# Patient Record
Sex: MALE | Race: WHITE | NOT HISPANIC OR LATINO | URBAN - METROPOLITAN AREA
[De-identification: names, ages, dates, MRNs, and addresses within clinical notes are randomized per-mention and may not be internally consistent; named-entity substitution may affect disease eponyms.]

---

## 2019-02-12 ENCOUNTER — OUTPATIENT (OUTPATIENT)
Dept: OUTPATIENT SERVICES | Facility: HOSPITAL | Age: 64
LOS: 1 days | Discharge: HOME | End: 2019-02-12
Payer: COMMERCIAL

## 2019-02-12 DIAGNOSIS — Z98.890 OTHER SPECIFIED POSTPROCEDURAL STATES: Chronic | ICD-10-CM

## 2019-02-12 DIAGNOSIS — Z90.89 ACQUIRED ABSENCE OF OTHER ORGANS: Chronic | ICD-10-CM

## 2019-02-12 DIAGNOSIS — R22.0 LOCALIZED SWELLING, MASS AND LUMP, HEAD: ICD-10-CM

## 2019-02-12 LAB
ANION GAP SERPL CALC-SCNC: 7 MMOL/L — SIGNIFICANT CHANGE UP (ref 5–17)
APTT BLD: 31 SEC — SIGNIFICANT CHANGE UP (ref 27.5–36.3)
BASOPHILS # BLD AUTO: 0.03 K/UL — SIGNIFICANT CHANGE UP (ref 0–0.2)
BASOPHILS NFR BLD AUTO: 0.5 % — SIGNIFICANT CHANGE UP (ref 0–2)
BUN SERPL-MCNC: 28 MG/DL — HIGH (ref 7–23)
CALCIUM SERPL-MCNC: 8.8 MG/DL — SIGNIFICANT CHANGE UP (ref 8.5–10.1)
CHLORIDE SERPL-SCNC: 108 MMOL/L — SIGNIFICANT CHANGE UP (ref 96–108)
CO2 SERPL-SCNC: 24 MMOL/L — SIGNIFICANT CHANGE UP (ref 22–31)
CREAT SERPL-MCNC: 0.94 MG/DL — SIGNIFICANT CHANGE UP (ref 0.5–1.3)
EOSINOPHIL # BLD AUTO: 0.36 K/UL — SIGNIFICANT CHANGE UP (ref 0–0.5)
EOSINOPHIL NFR BLD AUTO: 5.9 % — SIGNIFICANT CHANGE UP (ref 0–6)
GLUCOSE SERPL-MCNC: 89 MG/DL — SIGNIFICANT CHANGE UP (ref 70–99)
HCT VFR BLD CALC: 43.4 % — SIGNIFICANT CHANGE UP (ref 39–50)
HGB BLD-MCNC: 14.5 G/DL — SIGNIFICANT CHANGE UP (ref 13–17)
IMM GRANULOCYTES NFR BLD AUTO: 0.2 % — SIGNIFICANT CHANGE UP (ref 0–1.5)
INR BLD: 0.99 RATIO — SIGNIFICANT CHANGE UP (ref 0.88–1.16)
LYMPHOCYTES # BLD AUTO: 2.18 K/UL — SIGNIFICANT CHANGE UP (ref 1–3.3)
LYMPHOCYTES # BLD AUTO: 35.7 % — SIGNIFICANT CHANGE UP (ref 13–44)
MCHC RBC-ENTMCNC: 31.1 PG — SIGNIFICANT CHANGE UP (ref 27–34)
MCHC RBC-ENTMCNC: 33.4 GM/DL — SIGNIFICANT CHANGE UP (ref 32–36)
MCV RBC AUTO: 93.1 FL — SIGNIFICANT CHANGE UP (ref 80–100)
MONOCYTES # BLD AUTO: 0.59 K/UL — SIGNIFICANT CHANGE UP (ref 0–0.9)
MONOCYTES NFR BLD AUTO: 9.7 % — SIGNIFICANT CHANGE UP (ref 2–14)
NEUTROPHILS # BLD AUTO: 2.94 K/UL — SIGNIFICANT CHANGE UP (ref 1.8–7.4)
NEUTROPHILS NFR BLD AUTO: 48 % — SIGNIFICANT CHANGE UP (ref 43–77)
NRBC # BLD: 0 /100 WBCS — SIGNIFICANT CHANGE UP (ref 0–0)
PLATELET # BLD AUTO: 238 K/UL — SIGNIFICANT CHANGE UP (ref 150–400)
POTASSIUM SERPL-MCNC: 4.2 MMOL/L — SIGNIFICANT CHANGE UP (ref 3.5–5.3)
POTASSIUM SERPL-SCNC: 4.2 MMOL/L — SIGNIFICANT CHANGE UP (ref 3.5–5.3)
PROTHROM AB SERPL-ACNC: 11 SEC — SIGNIFICANT CHANGE UP (ref 10–12.9)
RBC # BLD: 4.66 M/UL — SIGNIFICANT CHANGE UP (ref 4.2–5.8)
RBC # FLD: 15.4 % — HIGH (ref 10.3–14.5)
SODIUM SERPL-SCNC: 139 MMOL/L — SIGNIFICANT CHANGE UP (ref 135–145)
WBC # BLD: 6.11 K/UL — SIGNIFICANT CHANGE UP (ref 3.8–10.5)
WBC # FLD AUTO: 6.11 K/UL — SIGNIFICANT CHANGE UP (ref 3.8–10.5)

## 2019-02-12 PROCEDURE — 93010 ELECTROCARDIOGRAM REPORT: CPT

## 2019-02-12 NOTE — ASU PATIENT PROFILE, ADULT - PMH
Asthma    Hyperlipidemia, unspecified hyperlipidemia type    Hypertension, unspecified type    Intranasal mass    Thyroid nodule  left thyroidectomy

## 2019-02-12 NOTE — ASU PATIENT PROFILE, ADULT - TEACHING/LEARNING LEARNING PREFERENCES
verbal instruction/computer/internet/skill demonstration/video/audio/group instruction/individual instruction/pictorial/written material

## 2019-02-12 NOTE — CHART NOTE - NSCHARTNOTEFT_GEN_A_CORE
Vital Signs   Pulse 69  Respirations 16   Temperature 97.5  B/P 125/64  SpO2 99%    Plan   1. NPO after midnight  2. Take the following medications with sips of water on the day of procedure: Synthroid, Losartan  3. Drink a quart of extra  fluids the day before your surgery.  4. Medical clearance with Dr. West  7. CBC, BMP and PT/PTT/INR sent to lab  8. EKG done

## 2019-02-15 RX ORDER — SODIUM CHLORIDE 9 MG/ML
3 INJECTION INTRAMUSCULAR; INTRAVENOUS; SUBCUTANEOUS EVERY 8 HOURS
Qty: 0 | Refills: 0 | Status: DISCONTINUED | OUTPATIENT
Start: 2019-02-19 | End: 2019-03-06

## 2019-02-19 ENCOUNTER — OUTPATIENT (OUTPATIENT)
Dept: OUTPATIENT SERVICES | Facility: HOSPITAL | Age: 64
LOS: 1 days | Discharge: HOME | End: 2019-02-19
Payer: COMMERCIAL

## 2019-02-19 VITALS
SYSTOLIC BLOOD PRESSURE: 127 MMHG | TEMPERATURE: 98 F | HEART RATE: 74 BPM | OXYGEN SATURATION: 96 % | DIASTOLIC BLOOD PRESSURE: 67 MMHG | RESPIRATION RATE: 16 BRPM

## 2019-02-19 VITALS
DIASTOLIC BLOOD PRESSURE: 79 MMHG | OXYGEN SATURATION: 99 % | RESPIRATION RATE: 16 BRPM | HEART RATE: 64 BPM | TEMPERATURE: 98 F | WEIGHT: 248.9 LBS | SYSTOLIC BLOOD PRESSURE: 127 MMHG

## 2019-02-19 DIAGNOSIS — Z98.890 OTHER SPECIFIED POSTPROCEDURAL STATES: Chronic | ICD-10-CM

## 2019-02-19 DIAGNOSIS — Z90.49 ACQUIRED ABSENCE OF OTHER SPECIFIED PARTS OF DIGESTIVE TRACT: ICD-10-CM

## 2019-02-19 DIAGNOSIS — J45.909 UNSPECIFIED ASTHMA, UNCOMPLICATED: ICD-10-CM

## 2019-02-19 DIAGNOSIS — J33.9 NASAL POLYP, UNSPECIFIED: ICD-10-CM

## 2019-02-19 DIAGNOSIS — J34.89 OTHER SPECIFIED DISORDERS OF NOSE AND NASAL SINUSES: ICD-10-CM

## 2019-02-19 DIAGNOSIS — Z90.89 ACQUIRED ABSENCE OF OTHER ORGANS: Chronic | ICD-10-CM

## 2019-02-19 DIAGNOSIS — E78.5 HYPERLIPIDEMIA, UNSPECIFIED: ICD-10-CM

## 2019-02-19 DIAGNOSIS — E03.9 HYPOTHYROIDISM, UNSPECIFIED: ICD-10-CM

## 2019-02-19 DIAGNOSIS — I10 ESSENTIAL (PRIMARY) HYPERTENSION: ICD-10-CM

## 2019-02-19 PROCEDURE — 88305 TISSUE EXAM BY PATHOLOGIST: CPT | Mod: 26

## 2019-02-19 RX ORDER — FAMOTIDINE 10 MG/ML
20 INJECTION INTRAVENOUS ONCE
Qty: 0 | Refills: 0 | Status: COMPLETED | OUTPATIENT
Start: 2019-02-19 | End: 2019-02-19

## 2019-02-19 RX ORDER — MORPHINE SULFATE 50 MG/1
4 CAPSULE, EXTENDED RELEASE ORAL EVERY 4 HOURS
Qty: 0 | Refills: 0 | Status: DISCONTINUED | OUTPATIENT
Start: 2019-02-19 | End: 2019-02-19

## 2019-02-19 RX ORDER — SODIUM CHLORIDE 9 MG/ML
1000 INJECTION, SOLUTION INTRAVENOUS
Qty: 0 | Refills: 0 | Status: DISCONTINUED | OUTPATIENT
Start: 2019-02-19 | End: 2019-02-19

## 2019-02-19 RX ORDER — OXYCODONE HYDROCHLORIDE 5 MG/1
5 TABLET ORAL ONCE
Qty: 0 | Refills: 0 | Status: DISCONTINUED | OUTPATIENT
Start: 2019-02-19 | End: 2019-02-19

## 2019-02-19 RX ORDER — ONDANSETRON 8 MG/1
4 TABLET, FILM COATED ORAL EVERY 6 HOURS
Qty: 0 | Refills: 0 | Status: DISCONTINUED | OUTPATIENT
Start: 2019-02-19 | End: 2019-03-06

## 2019-02-19 RX ORDER — ACETAMINOPHEN 500 MG
975 TABLET ORAL ONCE
Qty: 0 | Refills: 0 | Status: COMPLETED | OUTPATIENT
Start: 2019-02-19 | End: 2019-02-19

## 2019-02-19 RX ORDER — OXYCODONE HYDROCHLORIDE 5 MG/1
10 TABLET ORAL ONCE
Qty: 0 | Refills: 0 | Status: DISCONTINUED | OUTPATIENT
Start: 2019-02-19 | End: 2019-02-19

## 2019-02-19 RX ORDER — MEPERIDINE HYDROCHLORIDE 50 MG/ML
12.5 INJECTION INTRAMUSCULAR; INTRAVENOUS; SUBCUTANEOUS
Qty: 0 | Refills: 0 | Status: DISCONTINUED | OUTPATIENT
Start: 2019-02-19 | End: 2019-02-19

## 2019-02-19 RX ORDER — ONDANSETRON 8 MG/1
4 TABLET, FILM COATED ORAL ONCE
Qty: 0 | Refills: 0 | Status: DISCONTINUED | OUTPATIENT
Start: 2019-02-19 | End: 2019-02-19

## 2019-02-19 RX ORDER — OXYCODONE HYDROCHLORIDE 5 MG/1
5 TABLET ORAL EVERY 6 HOURS
Qty: 0 | Refills: 0 | Status: DISCONTINUED | OUTPATIENT
Start: 2019-02-19 | End: 2019-02-19

## 2019-02-19 RX ORDER — FENTANYL CITRATE 50 UG/ML
50 INJECTION INTRAVENOUS
Qty: 0 | Refills: 0 | Status: DISCONTINUED | OUTPATIENT
Start: 2019-02-19 | End: 2019-02-19

## 2019-02-19 RX ADMIN — Medication 975 MILLIGRAM(S): at 09:53

## 2019-02-19 RX ADMIN — SODIUM CHLORIDE 100 MILLILITER(S): 9 INJECTION, SOLUTION INTRAVENOUS at 11:13

## 2019-02-19 RX ADMIN — OXYCODONE HYDROCHLORIDE 10 MILLIGRAM(S): 5 TABLET ORAL at 09:52

## 2019-02-19 RX ADMIN — FAMOTIDINE 20 MILLIGRAM(S): 10 INJECTION INTRAVENOUS at 09:52

## 2019-02-19 RX ADMIN — Medication 975 MILLIGRAM(S): at 09:52

## 2019-02-19 RX ADMIN — OXYCODONE HYDROCHLORIDE 10 MILLIGRAM(S): 5 TABLET ORAL at 09:53

## 2019-02-19 NOTE — BRIEF OPERATIVE NOTE - PROCEDURE
<<-----Click on this checkbox to enter Procedure Excision of left intranasal lesion  02/19/2019    Active  FRAN

## 2019-02-19 NOTE — ASU DISCHARGE PLAN (ADULT/PEDIATRIC). - MEDICATION SUMMARY - MEDICATIONS TO TAKE
I will START or STAY ON the medications listed below when I get home from the hospital:    losartan 100 mg oral tablet  -- 1 tab(s) by mouth once a day (in the morning)  -- Indication: For home med    rosuvastatin 5 mg oral tablet  -- 1 tab(s) by mouth once a day (in the morning)  -- Indication: For home med    Ventolin HFA 90 mcg/inh inhalation aerosol  -- 2 puff(s) inhaled 4 times a day, As Needed  -- Indication: For home med    Fish Oil 1000 mg oral capsule  -- 1 cap(s) by mouth 3 times a day  -- Indication: For home med    Synthroid 100 mcg (0.1 mg) oral tablet  -- 1 tab(s) by mouth once a day (in the morning)  -- Indication: For home med    Multiple Vitamins oral tablet  -- 1 tab(s) by mouth once a day. last dose 2/ 12/ 2019  -- Indication: For home med

## 2019-02-19 NOTE — ASU DISCHARGE PLAN (ADULT/PEDIATRIC). - ITEMS TO FOLLOWUP WITH YOUR PHYSICIAN'S
do not blow your nose; use bacitracin twice daily; follow up with GUANAKO Boswell in 2 weeks, call for an appt.

## 2019-03-07 LAB — SURGICAL PATHOLOGY FINAL REPORT - CH: SIGNIFICANT CHANGE UP

## 2022-09-09 NOTE — ASU PATIENT PROFILE, ADULT - PMH
Asthma    Hyperlipidemia, unspecified hyperlipidemia type    Hypertension, unspecified type    Intranasal mass    Thyroid nodule  left thyroidectomy
Please avoid use.

## 2023-01-01 ENCOUNTER — INPATIENT (INPATIENT)
Facility: HOSPITAL | Age: 68
LOS: 9 days | DRG: 896 | End: 2023-01-28
Attending: INTERNAL MEDICINE | Admitting: INTERNAL MEDICINE
Payer: MEDICARE

## 2023-01-01 VITALS
DIASTOLIC BLOOD PRESSURE: 70 MMHG | TEMPERATURE: 98 F | SYSTOLIC BLOOD PRESSURE: 128 MMHG | WEIGHT: 240.08 LBS | RESPIRATION RATE: 24 BRPM | HEART RATE: 106 BPM | OXYGEN SATURATION: 100 %

## 2023-01-01 DIAGNOSIS — I49.9 CARDIAC ARRHYTHMIA, UNSPECIFIED: ICD-10-CM

## 2023-01-01 DIAGNOSIS — E78.5 HYPERLIPIDEMIA, UNSPECIFIED: ICD-10-CM

## 2023-01-01 DIAGNOSIS — F10.20 ALCOHOL DEPENDENCE, UNCOMPLICATED: ICD-10-CM

## 2023-01-01 DIAGNOSIS — J96.01 ACUTE RESPIRATORY FAILURE WITH HYPOXIA: ICD-10-CM

## 2023-01-01 DIAGNOSIS — Z98.890 OTHER SPECIFIED POSTPROCEDURAL STATES: Chronic | ICD-10-CM

## 2023-01-01 DIAGNOSIS — I10 ESSENTIAL (PRIMARY) HYPERTENSION: ICD-10-CM

## 2023-01-01 DIAGNOSIS — F10.239 ALCOHOL DEPENDENCE WITH WITHDRAWAL, UNSPECIFIED: ICD-10-CM

## 2023-01-01 DIAGNOSIS — M86.10 OTHER ACUTE OSTEOMYELITIS, UNSPECIFIED SITE: ICD-10-CM

## 2023-01-01 DIAGNOSIS — I42.0 DILATED CARDIOMYOPATHY: ICD-10-CM

## 2023-01-01 DIAGNOSIS — E03.9 HYPOTHYROIDISM, UNSPECIFIED: ICD-10-CM

## 2023-01-01 DIAGNOSIS — Z87.898 PERSONAL HISTORY OF OTHER SPECIFIED CONDITIONS: ICD-10-CM

## 2023-01-01 DIAGNOSIS — Z90.89 ACQUIRED ABSENCE OF OTHER ORGANS: Chronic | ICD-10-CM

## 2023-01-01 DIAGNOSIS — E87.5 HYPERKALEMIA: ICD-10-CM

## 2023-01-01 DIAGNOSIS — E83.39 OTHER DISORDERS OF PHOSPHORUS METABOLISM: ICD-10-CM

## 2023-01-01 DIAGNOSIS — R79.89 OTHER SPECIFIED ABNORMAL FINDINGS OF BLOOD CHEMISTRY: ICD-10-CM

## 2023-01-01 DIAGNOSIS — R41.82 ALTERED MENTAL STATUS, UNSPECIFIED: ICD-10-CM

## 2023-01-01 DIAGNOSIS — I50.23 ACUTE ON CHRONIC SYSTOLIC (CONGESTIVE) HEART FAILURE: ICD-10-CM

## 2023-01-01 DIAGNOSIS — A41.9 SEPSIS, UNSPECIFIED ORGANISM: ICD-10-CM

## 2023-01-01 DIAGNOSIS — I42.9 CARDIOMYOPATHY, UNSPECIFIED: ICD-10-CM

## 2023-01-01 DIAGNOSIS — E87.1 HYPO-OSMOLALITY AND HYPONATREMIA: ICD-10-CM

## 2023-01-01 DIAGNOSIS — G92.8 OTHER TOXIC ENCEPHALOPATHY: ICD-10-CM

## 2023-01-01 DIAGNOSIS — N17.9 ACUTE KIDNEY FAILURE, UNSPECIFIED: ICD-10-CM

## 2023-01-01 LAB
ADD ON TEST-SPECIMEN IN LAB: SIGNIFICANT CHANGE UP
ALBUMIN SERPL ELPH-MCNC: 1.9 G/DL — LOW (ref 3.3–5)
ALBUMIN SERPL ELPH-MCNC: 2.1 G/DL — LOW (ref 3.3–5)
ALBUMIN SERPL ELPH-MCNC: 2.2 G/DL — LOW (ref 3.3–5)
ALBUMIN SERPL ELPH-MCNC: 2.4 G/DL — LOW (ref 3.3–5)
ALBUMIN SERPL ELPH-MCNC: 2.6 G/DL — LOW (ref 3.3–5)
ALBUMIN SERPL ELPH-MCNC: 3 G/DL — LOW (ref 3.3–5)
ALBUMIN SERPL ELPH-MCNC: 3 G/DL — LOW (ref 3.3–5)
ALBUMIN SERPL ELPH-MCNC: 3.1 G/DL — LOW (ref 3.3–5)
ALBUMIN SERPL ELPH-MCNC: 3.1 G/DL — LOW (ref 3.3–5)
ALBUMIN SERPL ELPH-MCNC: 3.2 G/DL — LOW (ref 3.3–5)
ALP SERPL-CCNC: 101 U/L — SIGNIFICANT CHANGE UP (ref 40–120)
ALP SERPL-CCNC: 103 U/L — SIGNIFICANT CHANGE UP (ref 40–120)
ALP SERPL-CCNC: 103 U/L — SIGNIFICANT CHANGE UP (ref 40–120)
ALP SERPL-CCNC: 105 U/L — SIGNIFICANT CHANGE UP (ref 40–120)
ALP SERPL-CCNC: 115 U/L — SIGNIFICANT CHANGE UP (ref 40–120)
ALP SERPL-CCNC: 116 U/L — SIGNIFICANT CHANGE UP (ref 40–120)
ALP SERPL-CCNC: 117 U/L — SIGNIFICANT CHANGE UP (ref 40–120)
ALP SERPL-CCNC: 118 U/L — SIGNIFICANT CHANGE UP (ref 40–120)
ALP SERPL-CCNC: 118 U/L — SIGNIFICANT CHANGE UP (ref 40–120)
ALP SERPL-CCNC: 86 U/L — SIGNIFICANT CHANGE UP (ref 40–120)
ALP SERPL-CCNC: 89 U/L — SIGNIFICANT CHANGE UP (ref 40–120)
ALP SERPL-CCNC: 90 U/L — SIGNIFICANT CHANGE UP (ref 40–120)
ALP SERPL-CCNC: 93 U/L — SIGNIFICANT CHANGE UP (ref 40–120)
ALT FLD-CCNC: 166 U/L — HIGH (ref 12–78)
ALT FLD-CCNC: 178 U/L — HIGH (ref 12–78)
ALT FLD-CCNC: 203 U/L — HIGH (ref 12–78)
ALT FLD-CCNC: 210 U/L — HIGH (ref 12–78)
ALT FLD-CCNC: 400 U/L — HIGH (ref 12–78)
ALT FLD-CCNC: 5390 U/L — HIGH (ref 12–78)
ALT FLD-CCNC: 557 U/L — HIGH (ref 12–78)
ALT FLD-CCNC: 706 U/L — HIGH (ref 12–78)
ALT FLD-CCNC: 743 U/L — HIGH (ref 12–78)
ALT FLD-CCNC: 782 U/L — HIGH (ref 12–78)
ALT FLD-CCNC: 784 U/L — HIGH (ref 12–78)
ALT FLD-CCNC: 813 U/L — HIGH (ref 12–78)
ALT FLD-CCNC: 825 U/L — HIGH (ref 12–78)
AMMONIA BLD-MCNC: 33 UMOL/L — HIGH (ref 11–32)
AMMONIA BLD-MCNC: 48 UMOL/L — HIGH (ref 11–32)
AMMONIA BLD-MCNC: 48 UMOL/L — HIGH (ref 11–32)
AMMONIA BLD-MCNC: 54 UMOL/L — HIGH (ref 11–32)
AMMONIA BLD-MCNC: 61 UMOL/L — HIGH (ref 11–32)
AMMONIA BLD-MCNC: 65 UMOL/L — HIGH (ref 11–32)
AMMONIA BLD-MCNC: 67 UMOL/L — HIGH (ref 11–32)
AMMONIA BLD-MCNC: 74 UMOL/L — HIGH (ref 11–32)
AMPHET UR-MCNC: NEGATIVE — SIGNIFICANT CHANGE UP
ANION GAP SERPL CALC-SCNC: 10 MMOL/L — SIGNIFICANT CHANGE UP (ref 5–17)
ANION GAP SERPL CALC-SCNC: 11 MMOL/L — SIGNIFICANT CHANGE UP (ref 5–17)
ANION GAP SERPL CALC-SCNC: 12 MMOL/L — SIGNIFICANT CHANGE UP (ref 5–17)
ANION GAP SERPL CALC-SCNC: 12 MMOL/L — SIGNIFICANT CHANGE UP (ref 5–17)
ANION GAP SERPL CALC-SCNC: 21 MMOL/L — HIGH (ref 5–17)
ANION GAP SERPL CALC-SCNC: 3 MMOL/L — LOW (ref 5–17)
ANION GAP SERPL CALC-SCNC: 5 MMOL/L — SIGNIFICANT CHANGE UP (ref 5–17)
ANION GAP SERPL CALC-SCNC: 6 MMOL/L — SIGNIFICANT CHANGE UP (ref 5–17)
ANION GAP SERPL CALC-SCNC: 6 MMOL/L — SIGNIFICANT CHANGE UP (ref 5–17)
ANION GAP SERPL CALC-SCNC: 7 MMOL/L — SIGNIFICANT CHANGE UP (ref 5–17)
ANION GAP SERPL CALC-SCNC: 7 MMOL/L — SIGNIFICANT CHANGE UP (ref 5–17)
ANION GAP SERPL CALC-SCNC: 8 MMOL/L — SIGNIFICANT CHANGE UP (ref 5–17)
ANION GAP SERPL CALC-SCNC: 9 MMOL/L — SIGNIFICANT CHANGE UP (ref 5–17)
ANION GAP SERPL CALC-SCNC: 9 MMOL/L — SIGNIFICANT CHANGE UP (ref 5–17)
APAP SERPL-MCNC: 2 UG/ML — LOW (ref 10–30)
APPEARANCE UR: CLEAR — SIGNIFICANT CHANGE UP
APTT BLD: 33.2 SEC — SIGNIFICANT CHANGE UP (ref 27.5–35.5)
APTT BLD: 35.3 SEC — SIGNIFICANT CHANGE UP (ref 27.5–35.5)
APTT BLD: 36 SEC — HIGH (ref 27.5–35.5)
APTT BLD: 37.1 SEC — HIGH (ref 27.5–35.5)
APTT BLD: 37.3 SEC — HIGH (ref 27.5–35.5)
APTT BLD: 38.6 SEC — HIGH (ref 27.5–35.5)
APTT BLD: 41.1 SEC — HIGH (ref 27.5–35.5)
AST SERPL-CCNC: 111 U/L — HIGH (ref 15–37)
AST SERPL-CCNC: 117 U/L — HIGH (ref 15–37)
AST SERPL-CCNC: 121 U/L — HIGH (ref 15–37)
AST SERPL-CCNC: 1318 U/L — HIGH (ref 15–37)
AST SERPL-CCNC: 204 U/L — HIGH (ref 15–37)
AST SERPL-CCNC: 228 U/L — HIGH (ref 15–37)
AST SERPL-CCNC: 339 U/L — HIGH (ref 15–37)
AST SERPL-CCNC: 634 U/L — HIGH (ref 15–37)
AST SERPL-CCNC: 757 U/L — HIGH (ref 15–37)
AST SERPL-CCNC: 786 U/L — HIGH (ref 15–37)
AST SERPL-CCNC: 798 U/L — HIGH (ref 15–37)
AST SERPL-CCNC: 806 U/L — HIGH (ref 15–37)
AST SERPL-CCNC: SIGNIFICANT CHANGE UP U/L (ref 15–37)
BACTERIA # UR AUTO: ABNORMAL
BARBITURATES UR SCN-MCNC: NEGATIVE — SIGNIFICANT CHANGE UP
BASE EXCESS BLDA CALC-SCNC: -10.1 MMOL/L — LOW (ref -2–3)
BASE EXCESS BLDA CALC-SCNC: -15.8 MMOL/L — LOW (ref -2–3)
BASE EXCESS BLDA CALC-SCNC: -16.7 MMOL/L — LOW (ref -2–3)
BASE EXCESS BLDA CALC-SCNC: -2.3 MMOL/L — LOW (ref -2–3)
BASE EXCESS BLDA CALC-SCNC: -5.9 MMOL/L — LOW (ref -2–3)
BASOPHILS # BLD AUTO: 0.05 K/UL — SIGNIFICANT CHANGE UP (ref 0–0.2)
BASOPHILS NFR BLD AUTO: 0.8 % — SIGNIFICANT CHANGE UP (ref 0–2)
BENZODIAZ UR-MCNC: POSITIVE — SIGNIFICANT CHANGE UP
BILIRUB DIRECT SERPL-MCNC: 0.6 MG/DL — HIGH (ref 0–0.3)
BILIRUB DIRECT SERPL-MCNC: 0.6 MG/DL — HIGH (ref 0–0.3)
BILIRUB DIRECT SERPL-MCNC: 0.7 MG/DL — HIGH (ref 0–0.3)
BILIRUB DIRECT SERPL-MCNC: 0.7 MG/DL — HIGH (ref 0–0.3)
BILIRUB DIRECT SERPL-MCNC: 0.8 MG/DL — HIGH (ref 0–0.3)
BILIRUB INDIRECT FLD-MCNC: 0.4 MG/DL — SIGNIFICANT CHANGE UP (ref 0.2–1)
BILIRUB INDIRECT FLD-MCNC: 0.5 MG/DL — SIGNIFICANT CHANGE UP (ref 0.2–1)
BILIRUB INDIRECT FLD-MCNC: 0.6 MG/DL — SIGNIFICANT CHANGE UP (ref 0.2–1)
BILIRUB INDIRECT FLD-MCNC: 0.7 MG/DL — SIGNIFICANT CHANGE UP (ref 0.2–1)
BILIRUB INDIRECT FLD-MCNC: 0.7 MG/DL — SIGNIFICANT CHANGE UP (ref 0.2–1)
BILIRUB SERPL-MCNC: 0.6 MG/DL — SIGNIFICANT CHANGE UP (ref 0.2–1.2)
BILIRUB SERPL-MCNC: 0.9 MG/DL — SIGNIFICANT CHANGE UP (ref 0.2–1.2)
BILIRUB SERPL-MCNC: 1.1 MG/DL — SIGNIFICANT CHANGE UP (ref 0.2–1.2)
BILIRUB SERPL-MCNC: 1.2 MG/DL — SIGNIFICANT CHANGE UP (ref 0.2–1.2)
BILIRUB SERPL-MCNC: 1.3 MG/DL — HIGH (ref 0.2–1.2)
BILIRUB SERPL-MCNC: 1.4 MG/DL — HIGH (ref 0.2–1.2)
BILIRUB SERPL-MCNC: 1.4 MG/DL — HIGH (ref 0.2–1.2)
BILIRUB SERPL-MCNC: 1.8 MG/DL — HIGH (ref 0.2–1.2)
BILIRUB SERPL-MCNC: 2.4 MG/DL — HIGH (ref 0.2–1.2)
BILIRUB SERPL-MCNC: 2.7 MG/DL — HIGH (ref 0.2–1.2)
BILIRUB UR-MCNC: NEGATIVE — SIGNIFICANT CHANGE UP
BLOOD GAS COMMENTS ARTERIAL: SIGNIFICANT CHANGE UP
BUN SERPL-MCNC: 44 MG/DL — HIGH (ref 7–23)
BUN SERPL-MCNC: 45 MG/DL — HIGH (ref 7–23)
BUN SERPL-MCNC: 48 MG/DL — HIGH (ref 7–23)
BUN SERPL-MCNC: 48 MG/DL — HIGH (ref 7–23)
BUN SERPL-MCNC: 50 MG/DL — HIGH (ref 7–23)
BUN SERPL-MCNC: 51 MG/DL — HIGH (ref 7–23)
BUN SERPL-MCNC: 53 MG/DL — HIGH (ref 7–23)
BUN SERPL-MCNC: 62 MG/DL — HIGH (ref 7–23)
BUN SERPL-MCNC: 63 MG/DL — HIGH (ref 7–23)
BUN SERPL-MCNC: 69 MG/DL — HIGH (ref 7–23)
BUN SERPL-MCNC: 72 MG/DL — HIGH (ref 7–23)
BUN SERPL-MCNC: 75 MG/DL — HIGH (ref 7–23)
BUN SERPL-MCNC: 75 MG/DL — HIGH (ref 7–23)
BUN SERPL-MCNC: 78 MG/DL — HIGH (ref 7–23)
BUN SERPL-MCNC: 81 MG/DL — HIGH (ref 7–23)
BUN SERPL-MCNC: 83 MG/DL — HIGH (ref 7–23)
CA-I BLD-SCNC: 0.84 MMOL/L — LOW (ref 1.15–1.33)
CA-I BLD-SCNC: 1.07 MMOL/L — LOW (ref 1.15–1.33)
CALCIUM SERPL-MCNC: 7.1 MG/DL — LOW (ref 8.5–10.1)
CALCIUM SERPL-MCNC: 7.8 MG/DL — LOW (ref 8.5–10.1)
CALCIUM SERPL-MCNC: 7.8 MG/DL — LOW (ref 8.5–10.1)
CALCIUM SERPL-MCNC: 7.9 MG/DL — LOW (ref 8.5–10.1)
CALCIUM SERPL-MCNC: 8 MG/DL — LOW (ref 8.5–10.1)
CALCIUM SERPL-MCNC: 8.1 MG/DL — LOW (ref 8.5–10.1)
CALCIUM SERPL-MCNC: 8.2 MG/DL — LOW (ref 8.5–10.1)
CALCIUM SERPL-MCNC: 8.2 MG/DL — LOW (ref 8.5–10.1)
CALCIUM SERPL-MCNC: 8.8 MG/DL — SIGNIFICANT CHANGE UP (ref 8.5–10.1)
CALCIUM SERPL-MCNC: 8.8 MG/DL — SIGNIFICANT CHANGE UP (ref 8.5–10.1)
CALCIUM SERPL-MCNC: 8.9 MG/DL — SIGNIFICANT CHANGE UP (ref 8.5–10.1)
CALCIUM SERPL-MCNC: 9.1 MG/DL — SIGNIFICANT CHANGE UP (ref 8.4–10.5)
CALCIUM SERPL-MCNC: 9.2 MG/DL — SIGNIFICANT CHANGE UP (ref 8.5–10.1)
CHLORIDE SERPL-SCNC: 100 MMOL/L — SIGNIFICANT CHANGE UP (ref 96–108)
CHLORIDE SERPL-SCNC: 101 MMOL/L — SIGNIFICANT CHANGE UP (ref 96–108)
CHLORIDE SERPL-SCNC: 102 MMOL/L — SIGNIFICANT CHANGE UP (ref 96–108)
CHLORIDE SERPL-SCNC: 105 MMOL/L — SIGNIFICANT CHANGE UP (ref 96–108)
CHLORIDE SERPL-SCNC: 108 MMOL/L — SIGNIFICANT CHANGE UP (ref 96–108)
CHLORIDE SERPL-SCNC: 109 MMOL/L — HIGH (ref 96–108)
CHLORIDE SERPL-SCNC: 113 MMOL/L — HIGH (ref 96–108)
CHLORIDE SERPL-SCNC: 114 MMOL/L — HIGH (ref 96–108)
CHLORIDE SERPL-SCNC: 115 MMOL/L — HIGH (ref 96–108)
CHLORIDE SERPL-SCNC: 117 MMOL/L — HIGH (ref 96–108)
CHLORIDE SERPL-SCNC: 117 MMOL/L — HIGH (ref 96–108)
CHLORIDE SERPL-SCNC: 118 MMOL/L — HIGH (ref 96–108)
CHLORIDE SERPL-SCNC: 118 MMOL/L — HIGH (ref 96–108)
CHLORIDE SERPL-SCNC: 99 MMOL/L — SIGNIFICANT CHANGE UP (ref 96–108)
CHOLEST SERPL-MCNC: 76 MG/DL — SIGNIFICANT CHANGE UP
CK SERPL-CCNC: 151 U/L — SIGNIFICANT CHANGE UP (ref 26–308)
CK SERPL-CCNC: 505 U/L — HIGH (ref 26–308)
CK SERPL-CCNC: 857 U/L — HIGH (ref 26–308)
CO2 BLDA-SCNC: 12 MMOL/L — LOW (ref 19–24)
CO2 BLDA-SCNC: 14 MMOL/L — LOW (ref 19–24)
CO2 BLDA-SCNC: 18 MMOL/L — LOW (ref 19–24)
CO2 BLDA-SCNC: 25 MMOL/L — HIGH (ref 19–24)
CO2 BLDA-SCNC: 25 MMOL/L — HIGH (ref 19–24)
CO2 BLDA-SCNC: 27 MMOL/L — HIGH (ref 19–24)
CO2 SERPL-SCNC: 14 MMOL/L — LOW (ref 22–31)
CO2 SERPL-SCNC: 19 MMOL/L — LOW (ref 22–31)
CO2 SERPL-SCNC: 21 MMOL/L — LOW (ref 22–31)
CO2 SERPL-SCNC: 22 MMOL/L — SIGNIFICANT CHANGE UP (ref 22–31)
CO2 SERPL-SCNC: 24 MMOL/L — SIGNIFICANT CHANGE UP (ref 22–31)
CO2 SERPL-SCNC: 25 MMOL/L — SIGNIFICANT CHANGE UP (ref 22–31)
CO2 SERPL-SCNC: 26 MMOL/L — SIGNIFICANT CHANGE UP (ref 22–31)
CO2 SERPL-SCNC: 26 MMOL/L — SIGNIFICANT CHANGE UP (ref 22–31)
CO2 SERPL-SCNC: 28 MMOL/L — SIGNIFICANT CHANGE UP (ref 22–31)
COCAINE METAB.OTHER UR-MCNC: POSITIVE — SIGNIFICANT CHANGE UP
COLOR SPEC: YELLOW — SIGNIFICANT CHANGE UP
COMMENT - URINE: SIGNIFICANT CHANGE UP
CREAT ?TM UR-MCNC: 122 MG/DL — SIGNIFICANT CHANGE UP
CREAT ?TM UR-MCNC: 127 MG/DL — SIGNIFICANT CHANGE UP
CREAT SERPL-MCNC: 1.48 MG/DL — HIGH (ref 0.5–1.3)
CREAT SERPL-MCNC: 1.54 MG/DL — HIGH (ref 0.5–1.3)
CREAT SERPL-MCNC: 1.56 MG/DL — HIGH (ref 0.5–1.3)
CREAT SERPL-MCNC: 1.64 MG/DL — HIGH (ref 0.5–1.3)
CREAT SERPL-MCNC: 1.81 MG/DL — HIGH (ref 0.5–1.3)
CREAT SERPL-MCNC: 1.87 MG/DL — HIGH (ref 0.5–1.3)
CREAT SERPL-MCNC: 2.04 MG/DL — HIGH (ref 0.5–1.3)
CREAT SERPL-MCNC: 2.3 MG/DL — HIGH (ref 0.5–1.3)
CREAT SERPL-MCNC: 2.33 MG/DL — HIGH (ref 0.5–1.3)
CREAT SERPL-MCNC: 2.6 MG/DL — HIGH (ref 0.5–1.3)
CREAT SERPL-MCNC: 2.62 MG/DL — HIGH (ref 0.5–1.3)
CREAT SERPL-MCNC: 2.62 MG/DL — HIGH (ref 0.5–1.3)
CREAT SERPL-MCNC: 2.71 MG/DL — HIGH (ref 0.5–1.3)
CREAT SERPL-MCNC: 2.74 MG/DL — HIGH (ref 0.5–1.3)
CREAT SERPL-MCNC: 2.76 MG/DL — HIGH (ref 0.5–1.3)
CREAT SERPL-MCNC: 3.63 MG/DL — HIGH (ref 0.5–1.3)
CREATININE, URINE RESULT: 96 MG/DL — SIGNIFICANT CHANGE UP
CULTURE RESULTS: SIGNIFICANT CHANGE UP
DIFF PNL FLD: NEGATIVE — SIGNIFICANT CHANGE UP
EGFR: 18 ML/MIN/1.73M2 — LOW
EGFR: 25 ML/MIN/1.73M2 — LOW
EGFR: 26 ML/MIN/1.73M2 — LOW
EGFR: 26 ML/MIN/1.73M2 — LOW
EGFR: 27 ML/MIN/1.73M2 — LOW
EGFR: 30 ML/MIN/1.73M2 — LOW
EGFR: 31 ML/MIN/1.73M2 — LOW
EGFR: 36 ML/MIN/1.73M2 — LOW
EGFR: 39 ML/MIN/1.73M2 — LOW
EGFR: 41 ML/MIN/1.73M2 — LOW
EGFR: 46 ML/MIN/1.73M2 — LOW
EGFR: 49 ML/MIN/1.73M2 — LOW
EGFR: 50 ML/MIN/1.73M2 — LOW
EGFR: 52 ML/MIN/1.73M2 — LOW
EOSINOPHIL # BLD AUTO: 0.08 K/UL — SIGNIFICANT CHANGE UP (ref 0–0.5)
EOSINOPHIL NFR BLD AUTO: 1.3 % — SIGNIFICANT CHANGE UP (ref 0–6)
EPI CELLS # UR: SIGNIFICANT CHANGE UP
ETHANOL SERPL-MCNC: <10 MG/DL — SIGNIFICANT CHANGE UP (ref 0–10)
FERRITIN SERPL-MCNC: 32 NG/ML — SIGNIFICANT CHANGE UP (ref 30–400)
FLUAV AG NPH QL: SIGNIFICANT CHANGE UP
FLUBV AG NPH QL: SIGNIFICANT CHANGE UP
GAS PNL BLDA: SIGNIFICANT CHANGE UP
GLUCOSE SERPL-MCNC: 100 MG/DL — HIGH (ref 70–99)
GLUCOSE SERPL-MCNC: 102 MG/DL — HIGH (ref 70–99)
GLUCOSE SERPL-MCNC: 105 MG/DL — HIGH (ref 70–99)
GLUCOSE SERPL-MCNC: 124 MG/DL — HIGH (ref 70–99)
GLUCOSE SERPL-MCNC: 126 MG/DL — HIGH (ref 70–99)
GLUCOSE SERPL-MCNC: 128 MG/DL — HIGH (ref 70–99)
GLUCOSE SERPL-MCNC: 146 MG/DL — HIGH (ref 70–99)
GLUCOSE SERPL-MCNC: 152 MG/DL — HIGH (ref 70–99)
GLUCOSE SERPL-MCNC: 153 MG/DL — HIGH (ref 70–99)
GLUCOSE SERPL-MCNC: 157 MG/DL — HIGH (ref 70–99)
GLUCOSE SERPL-MCNC: 161 MG/DL — HIGH (ref 70–99)
GLUCOSE SERPL-MCNC: 166 MG/DL — HIGH (ref 70–99)
GLUCOSE SERPL-MCNC: 176 MG/DL — HIGH (ref 70–99)
GLUCOSE SERPL-MCNC: 267 MG/DL — HIGH (ref 70–99)
GLUCOSE SERPL-MCNC: 84 MG/DL — SIGNIFICANT CHANGE UP (ref 70–99)
GLUCOSE SERPL-MCNC: 98 MG/DL — SIGNIFICANT CHANGE UP (ref 70–99)
GLUCOSE UR QL: NEGATIVE — SIGNIFICANT CHANGE UP
GRAM STN FLD: SIGNIFICANT CHANGE UP
HAV IGG SER QL IA: SIGNIFICANT CHANGE UP
HAV IGM SER-ACNC: SIGNIFICANT CHANGE UP
HAV IGM SER-ACNC: SIGNIFICANT CHANGE UP
HBV CORE AB SER-ACNC: SIGNIFICANT CHANGE UP
HBV CORE IGM SER-ACNC: SIGNIFICANT CHANGE UP
HBV CORE IGM SER-ACNC: SIGNIFICANT CHANGE UP
HBV E AB SER-ACNC: SIGNIFICANT CHANGE UP
HBV E AG SER-ACNC: SIGNIFICANT CHANGE UP
HBV SURFACE AB SER-ACNC: SIGNIFICANT CHANGE UP
HBV SURFACE AG SER-ACNC: SIGNIFICANT CHANGE UP
HBV SURFACE AG SER-ACNC: SIGNIFICANT CHANGE UP
HCO3 BLDA-SCNC: 11 MMOL/L — LOW (ref 21–28)
HCO3 BLDA-SCNC: 13 MMOL/L — LOW (ref 21–28)
HCO3 BLDA-SCNC: 17 MMOL/L — LOW (ref 21–28)
HCO3 BLDA-SCNC: 24 MMOL/L — SIGNIFICANT CHANGE UP (ref 21–28)
HCO3 BLDA-SCNC: 24 MMOL/L — SIGNIFICANT CHANGE UP (ref 21–28)
HCO3 BLDA-SCNC: 25 MMOL/L — SIGNIFICANT CHANGE UP (ref 21–28)
HCT VFR BLD CALC: 42.1 % — SIGNIFICANT CHANGE UP (ref 39–50)
HCT VFR BLD CALC: 43.2 % — SIGNIFICANT CHANGE UP (ref 39–50)
HCT VFR BLD CALC: 43.5 % — SIGNIFICANT CHANGE UP (ref 39–50)
HCT VFR BLD CALC: 43.6 % — SIGNIFICANT CHANGE UP (ref 39–50)
HCT VFR BLD CALC: 43.9 % — SIGNIFICANT CHANGE UP (ref 39–50)
HCT VFR BLD CALC: 46.5 % — SIGNIFICANT CHANGE UP (ref 39–50)
HCT VFR BLD CALC: 47.4 % — SIGNIFICANT CHANGE UP (ref 39–50)
HCV AB S/CO SERPL IA: 0.18 S/CO — SIGNIFICANT CHANGE UP (ref 0–0.99)
HCV AB S/CO SERPL IA: 0.2 S/CO — SIGNIFICANT CHANGE UP (ref 0–0.99)
HCV AB SERPL-IMP: SIGNIFICANT CHANGE UP
HCV AB SERPL-IMP: SIGNIFICANT CHANGE UP
HDLC SERPL-MCNC: 15 MG/DL — LOW
HGB BLD-MCNC: 14 G/DL — SIGNIFICANT CHANGE UP (ref 13–17)
HGB BLD-MCNC: 14 G/DL — SIGNIFICANT CHANGE UP (ref 13–17)
HGB BLD-MCNC: 14.1 G/DL — SIGNIFICANT CHANGE UP (ref 13–17)
HGB BLD-MCNC: 14.4 G/DL — SIGNIFICANT CHANGE UP (ref 13–17)
HGB BLD-MCNC: 14.6 G/DL — SIGNIFICANT CHANGE UP (ref 13–17)
HGB BLD-MCNC: 14.6 G/DL — SIGNIFICANT CHANGE UP (ref 13–17)
HGB BLD-MCNC: 14.8 G/DL — SIGNIFICANT CHANGE UP (ref 13–17)
HYALINE CASTS # UR AUTO: ABNORMAL /LPF
IGA FLD-MCNC: 696 MG/DL — HIGH (ref 84–499)
IGG FLD-MCNC: 1826 MG/DL — HIGH (ref 610–1660)
IGM SERPL-MCNC: 35 MG/DL — SIGNIFICANT CHANGE UP (ref 35–242)
IMM GRANULOCYTES NFR BLD AUTO: 0 % — SIGNIFICANT CHANGE UP (ref 0–0.9)
INR BLD: 1.38 RATIO — HIGH (ref 0.88–1.16)
INR BLD: 1.43 RATIO — HIGH (ref 0.88–1.16)
INR BLD: 1.67 RATIO — HIGH (ref 0.88–1.16)
INR BLD: 1.79 RATIO — HIGH (ref 0.88–1.16)
INR BLD: 1.99 RATIO — HIGH (ref 0.88–1.16)
INR BLD: 2.15 RATIO — HIGH (ref 0.88–1.16)
INR BLD: 2.54 RATIO — HIGH (ref 0.88–1.16)
INTERPRETATION SERPL IFE-IMP: SIGNIFICANT CHANGE UP
KAPPA LC SER QL IFE: 13.94 MG/DL — HIGH (ref 0.33–1.94)
KAPPA/LAMBDA FREE LIGHT CHAIN RATIO, SERUM: 3.47 RATIO — HIGH (ref 0.26–1.65)
KETONES UR-MCNC: NEGATIVE — SIGNIFICANT CHANGE UP
LACTATE SERPL-SCNC: 16.5 MMOL/L — CRITICAL HIGH (ref 0.7–2)
LACTATE SERPL-SCNC: 6.5 MMOL/L — CRITICAL HIGH (ref 0.7–2)
LACTATE SERPL-SCNC: 8.3 MMOL/L — CRITICAL HIGH (ref 0.7–2)
LAMBDA LC SER QL IFE: 4.02 MG/DL — HIGH (ref 0.57–2.63)
LDH SERPL L TO P-CCNC: 274 U/L — HIGH (ref 84–241)
LEUKOCYTE ESTERASE UR-ACNC: NEGATIVE — SIGNIFICANT CHANGE UP
LIPID PNL WITH DIRECT LDL SERPL: 46 MG/DL — SIGNIFICANT CHANGE UP
LYMPHOCYTES # BLD AUTO: 1.04 K/UL — SIGNIFICANT CHANGE UP (ref 1–3.3)
LYMPHOCYTES # BLD AUTO: 16.5 % — SIGNIFICANT CHANGE UP (ref 13–44)
MAGNESIUM SERPL-MCNC: 1.7 MG/DL — SIGNIFICANT CHANGE UP (ref 1.6–2.6)
MAGNESIUM SERPL-MCNC: 2 MG/DL — SIGNIFICANT CHANGE UP (ref 1.6–2.6)
MAGNESIUM SERPL-MCNC: 2.1 MG/DL — SIGNIFICANT CHANGE UP (ref 1.6–2.6)
MAGNESIUM SERPL-MCNC: 2.2 MG/DL — SIGNIFICANT CHANGE UP (ref 1.6–2.6)
MAGNESIUM SERPL-MCNC: 2.3 MG/DL — SIGNIFICANT CHANGE UP (ref 1.6–2.6)
MAGNESIUM SERPL-MCNC: 2.4 MG/DL — SIGNIFICANT CHANGE UP (ref 1.6–2.6)
MAGNESIUM SERPL-MCNC: 2.5 MG/DL — SIGNIFICANT CHANGE UP (ref 1.6–2.6)
MCHC RBC-ENTMCNC: 30.5 PG — SIGNIFICANT CHANGE UP (ref 27–34)
MCHC RBC-ENTMCNC: 30.6 PG — SIGNIFICANT CHANGE UP (ref 27–34)
MCHC RBC-ENTMCNC: 30.7 PG — SIGNIFICANT CHANGE UP (ref 27–34)
MCHC RBC-ENTMCNC: 30.8 GM/DL — LOW (ref 32–36)
MCHC RBC-ENTMCNC: 30.8 PG — SIGNIFICANT CHANGE UP (ref 27–34)
MCHC RBC-ENTMCNC: 31.1 PG — SIGNIFICANT CHANGE UP (ref 27–34)
MCHC RBC-ENTMCNC: 31.4 PG — SIGNIFICANT CHANGE UP (ref 27–34)
MCHC RBC-ENTMCNC: 31.5 PG — SIGNIFICANT CHANGE UP (ref 27–34)
MCHC RBC-ENTMCNC: 31.8 GM/DL — LOW (ref 32–36)
MCHC RBC-ENTMCNC: 31.9 GM/DL — LOW (ref 32–36)
MCHC RBC-ENTMCNC: 32.2 GM/DL — SIGNIFICANT CHANGE UP (ref 32–36)
MCHC RBC-ENTMCNC: 33.3 GM/DL — SIGNIFICANT CHANGE UP (ref 32–36)
MCHC RBC-ENTMCNC: 33.5 GM/DL — SIGNIFICANT CHANGE UP (ref 32–36)
MCHC RBC-ENTMCNC: 33.5 GM/DL — SIGNIFICANT CHANGE UP (ref 32–36)
MCV RBC AUTO: 93 FL — SIGNIFICANT CHANGE UP (ref 80–100)
MCV RBC AUTO: 94 FL — SIGNIFICANT CHANGE UP (ref 80–100)
MCV RBC AUTO: 94.3 FL — SIGNIFICANT CHANGE UP (ref 80–100)
MCV RBC AUTO: 95 FL — SIGNIFICANT CHANGE UP (ref 80–100)
MCV RBC AUTO: 96.3 FL — SIGNIFICANT CHANGE UP (ref 80–100)
MCV RBC AUTO: 96.7 FL — SIGNIFICANT CHANGE UP (ref 80–100)
MCV RBC AUTO: 99 FL — SIGNIFICANT CHANGE UP (ref 80–100)
METHADONE UR-MCNC: NEGATIVE — SIGNIFICANT CHANGE UP
MITOCHONDRIA AB SER-ACNC: SIGNIFICANT CHANGE UP
MONOCYTES # BLD AUTO: 0.63 K/UL — SIGNIFICANT CHANGE UP (ref 0–0.9)
MONOCYTES NFR BLD AUTO: 10 % — SIGNIFICANT CHANGE UP (ref 2–14)
NEUTROPHILS # BLD AUTO: 4.5 K/UL — SIGNIFICANT CHANGE UP (ref 1.8–7.4)
NEUTROPHILS NFR BLD AUTO: 71.4 % — SIGNIFICANT CHANGE UP (ref 43–77)
NITRITE UR-MCNC: NEGATIVE — SIGNIFICANT CHANGE UP
NON HDL CHOLESTEROL: 61 MG/DL — SIGNIFICANT CHANGE UP
OPIATES UR-MCNC: NEGATIVE — SIGNIFICANT CHANGE UP
OSMOLALITY SERPL: 314 MOSMOL/KG — HIGH (ref 280–301)
OSMOLALITY UR: 583 MOSM/KG — SIGNIFICANT CHANGE UP (ref 50–1200)
PCO2 BLDA: 33 MMHG — LOW (ref 35–48)
PCO2 BLDA: 38 MMHG — SIGNIFICANT CHANGE UP (ref 35–48)
PCO2 BLDA: 42 MMHG — SIGNIFICANT CHANGE UP (ref 35–48)
PCO2 BLDA: 43 MMHG — SIGNIFICANT CHANGE UP (ref 35–48)
PCO2 BLDA: 52 MMHG — HIGH (ref 35–48)
PCO2 BLDA: 63 MMHG — HIGH (ref 35–48)
PCP SPEC-MCNC: SIGNIFICANT CHANGE UP
PCP UR-MCNC: NEGATIVE — SIGNIFICANT CHANGE UP
PH BLDA: 7.13 — CRITICAL LOW (ref 7.35–7.45)
PH BLDA: 7.14 — CRITICAL LOW (ref 7.35–7.45)
PH BLDA: 7.18 — CRITICAL LOW (ref 7.35–7.45)
PH BLDA: 7.22 — LOW (ref 7.35–7.45)
PH BLDA: 7.29 — LOW (ref 7.35–7.45)
PH BLDA: 7.35 — SIGNIFICANT CHANGE UP (ref 7.35–7.45)
PH UR: 5 — SIGNIFICANT CHANGE UP (ref 5–8)
PHOSPHATE SERPL-MCNC: 2.4 MG/DL — LOW (ref 2.5–4.5)
PHOSPHATE SERPL-MCNC: 2.5 MG/DL — SIGNIFICANT CHANGE UP (ref 2.5–4.5)
PHOSPHATE SERPL-MCNC: 3 MG/DL — SIGNIFICANT CHANGE UP (ref 2.5–4.5)
PHOSPHATE SERPL-MCNC: 3.5 MG/DL — SIGNIFICANT CHANGE UP (ref 2.5–4.5)
PHOSPHATE SERPL-MCNC: 4.6 MG/DL — HIGH (ref 2.5–4.5)
PHOSPHATE SERPL-MCNC: 5.6 MG/DL — HIGH (ref 2.5–4.5)
PHOSPHATE SERPL-MCNC: 6.1 MG/DL — HIGH (ref 2.5–4.5)
PHOSPHATE SERPL-MCNC: 6.1 MG/DL — HIGH (ref 2.5–4.5)
PHOSPHATE SERPL-MCNC: 6.2 MG/DL — HIGH (ref 2.5–4.5)
PHOSPHATE SERPL-MCNC: 6.2 MG/DL — HIGH (ref 2.5–4.5)
PHOSPHATE SERPL-MCNC: 6.5 MG/DL — HIGH (ref 2.5–4.5)
PHOSPHATE SERPL-MCNC: 6.6 MG/DL — HIGH (ref 2.5–4.5)
PHOSPHATE SERPL-MCNC: 7.7 MG/DL — HIGH (ref 2.5–4.5)
PLATELET # BLD AUTO: 118 K/UL — LOW (ref 150–400)
PLATELET # BLD AUTO: 119 K/UL — LOW (ref 150–400)
PLATELET # BLD AUTO: 125 K/UL — LOW (ref 150–400)
PLATELET # BLD AUTO: 130 K/UL — LOW (ref 150–400)
PLATELET # BLD AUTO: 181 K/UL — SIGNIFICANT CHANGE UP (ref 150–400)
PLATELET # BLD AUTO: 222 K/UL — SIGNIFICANT CHANGE UP (ref 150–400)
PLATELET # BLD AUTO: 251 K/UL — SIGNIFICANT CHANGE UP (ref 150–400)
PO2 BLDA: 110 MMHG — HIGH (ref 83–108)
PO2 BLDA: 145 MMHG — HIGH (ref 83–108)
PO2 BLDA: 167 MMHG — HIGH (ref 83–108)
PO2 BLDA: 191 MMHG — HIGH (ref 83–108)
PO2 BLDA: 81 MMHG — LOW (ref 83–108)
PO2 BLDA: 99 MMHG — SIGNIFICANT CHANGE UP (ref 83–108)
POTASSIUM SERPL-MCNC: 3.3 MMOL/L — LOW (ref 3.5–5.3)
POTASSIUM SERPL-MCNC: 3.5 MMOL/L — SIGNIFICANT CHANGE UP (ref 3.5–5.3)
POTASSIUM SERPL-MCNC: 3.6 MMOL/L — SIGNIFICANT CHANGE UP (ref 3.5–5.3)
POTASSIUM SERPL-MCNC: 3.7 MMOL/L — SIGNIFICANT CHANGE UP (ref 3.5–5.3)
POTASSIUM SERPL-MCNC: 3.9 MMOL/L — SIGNIFICANT CHANGE UP (ref 3.5–5.3)
POTASSIUM SERPL-MCNC: 4 MMOL/L — SIGNIFICANT CHANGE UP (ref 3.5–5.3)
POTASSIUM SERPL-MCNC: 4 MMOL/L — SIGNIFICANT CHANGE UP (ref 3.5–5.3)
POTASSIUM SERPL-MCNC: 4.1 MMOL/L — SIGNIFICANT CHANGE UP (ref 3.5–5.3)
POTASSIUM SERPL-MCNC: 4.3 MMOL/L — SIGNIFICANT CHANGE UP (ref 3.5–5.3)
POTASSIUM SERPL-MCNC: 4.4 MMOL/L — SIGNIFICANT CHANGE UP (ref 3.5–5.3)
POTASSIUM SERPL-MCNC: 4.7 MMOL/L — SIGNIFICANT CHANGE UP (ref 3.5–5.3)
POTASSIUM SERPL-MCNC: 4.8 MMOL/L — SIGNIFICANT CHANGE UP (ref 3.5–5.3)
POTASSIUM SERPL-MCNC: 5 MMOL/L — SIGNIFICANT CHANGE UP (ref 3.5–5.3)
POTASSIUM SERPL-MCNC: 5 MMOL/L — SIGNIFICANT CHANGE UP (ref 3.5–5.3)
POTASSIUM SERPL-MCNC: 5.1 MMOL/L — SIGNIFICANT CHANGE UP (ref 3.5–5.3)
POTASSIUM SERPL-MCNC: 5.8 MMOL/L — HIGH (ref 3.5–5.3)
POTASSIUM SERPL-SCNC: 3.3 MMOL/L — LOW (ref 3.5–5.3)
POTASSIUM SERPL-SCNC: 3.5 MMOL/L — SIGNIFICANT CHANGE UP (ref 3.5–5.3)
POTASSIUM SERPL-SCNC: 3.6 MMOL/L — SIGNIFICANT CHANGE UP (ref 3.5–5.3)
POTASSIUM SERPL-SCNC: 3.7 MMOL/L — SIGNIFICANT CHANGE UP (ref 3.5–5.3)
POTASSIUM SERPL-SCNC: 3.9 MMOL/L — SIGNIFICANT CHANGE UP (ref 3.5–5.3)
POTASSIUM SERPL-SCNC: 4 MMOL/L — SIGNIFICANT CHANGE UP (ref 3.5–5.3)
POTASSIUM SERPL-SCNC: 4 MMOL/L — SIGNIFICANT CHANGE UP (ref 3.5–5.3)
POTASSIUM SERPL-SCNC: 4.1 MMOL/L — SIGNIFICANT CHANGE UP (ref 3.5–5.3)
POTASSIUM SERPL-SCNC: 4.3 MMOL/L — SIGNIFICANT CHANGE UP (ref 3.5–5.3)
POTASSIUM SERPL-SCNC: 4.4 MMOL/L — SIGNIFICANT CHANGE UP (ref 3.5–5.3)
POTASSIUM SERPL-SCNC: 4.7 MMOL/L — SIGNIFICANT CHANGE UP (ref 3.5–5.3)
POTASSIUM SERPL-SCNC: 4.8 MMOL/L — SIGNIFICANT CHANGE UP (ref 3.5–5.3)
POTASSIUM SERPL-SCNC: 5 MMOL/L — SIGNIFICANT CHANGE UP (ref 3.5–5.3)
POTASSIUM SERPL-SCNC: 5 MMOL/L — SIGNIFICANT CHANGE UP (ref 3.5–5.3)
POTASSIUM SERPL-SCNC: 5.1 MMOL/L — SIGNIFICANT CHANGE UP (ref 3.5–5.3)
POTASSIUM SERPL-SCNC: 5.8 MMOL/L — HIGH (ref 3.5–5.3)
PROT ?TM UR-MCNC: 52 MG/DL — HIGH (ref 0–12)
PROT SERPL-MCNC: 6.4 GM/DL — SIGNIFICANT CHANGE UP (ref 6–8.3)
PROT SERPL-MCNC: 6.6 GM/DL — SIGNIFICANT CHANGE UP (ref 6–8.3)
PROT SERPL-MCNC: 6.6 GM/DL — SIGNIFICANT CHANGE UP (ref 6–8.3)
PROT SERPL-MCNC: 6.7 GM/DL — SIGNIFICANT CHANGE UP (ref 6–8.3)
PROT SERPL-MCNC: 6.7 GM/DL — SIGNIFICANT CHANGE UP (ref 6–8.3)
PROT SERPL-MCNC: 6.8 GM/DL — SIGNIFICANT CHANGE UP (ref 6–8.3)
PROT SERPL-MCNC: 7.2 GM/DL — SIGNIFICANT CHANGE UP (ref 6–8.3)
PROT SERPL-MCNC: 7.3 GM/DL — SIGNIFICANT CHANGE UP (ref 6–8.3)
PROT SERPL-MCNC: 7.3 GM/DL — SIGNIFICANT CHANGE UP (ref 6–8.3)
PROT SERPL-MCNC: 7.4 GM/DL — SIGNIFICANT CHANGE UP (ref 6–8.3)
PROT SERPL-MCNC: 7.5 GM/DL — SIGNIFICANT CHANGE UP (ref 6–8.3)
PROT UR-MCNC: 100
PROT/CREAT UR-RTO: 0.4 RATIO — HIGH (ref 0–0.2)
PROTHROM AB SERPL-ACNC: 16.1 SEC — HIGH (ref 10.5–13.4)
PROTHROM AB SERPL-ACNC: 16.6 SEC — HIGH (ref 10.5–13.4)
PROTHROM AB SERPL-ACNC: 16.7 SEC — HIGH (ref 10.5–13.4)
PROTHROM AB SERPL-ACNC: 16.7 SEC — HIGH (ref 10.5–13.4)
PROTHROM AB SERPL-ACNC: 19.5 SEC — HIGH (ref 10.5–13.4)
PROTHROM AB SERPL-ACNC: 20.9 SEC — HIGH (ref 10.5–13.4)
PROTHROM AB SERPL-ACNC: 23.3 SEC — HIGH (ref 10.5–13.4)
PROTHROM AB SERPL-ACNC: 25.1 SEC — HIGH (ref 10.5–13.4)
PROTHROM AB SERPL-ACNC: 29.7 SEC — HIGH (ref 10.5–13.4)
PTH-INTACT FLD-MCNC: 61 PG/ML — SIGNIFICANT CHANGE UP (ref 15–65)
RBC # BLD: 4.48 M/UL — SIGNIFICANT CHANGE UP (ref 4.2–5.8)
RBC # BLD: 4.56 M/UL — SIGNIFICANT CHANGE UP (ref 4.2–5.8)
RBC # BLD: 4.58 M/UL — SIGNIFICANT CHANGE UP (ref 4.2–5.8)
RBC # BLD: 4.58 M/UL — SIGNIFICANT CHANGE UP (ref 4.2–5.8)
RBC # BLD: 4.69 M/UL — SIGNIFICANT CHANGE UP (ref 4.2–5.8)
RBC # BLD: 4.79 M/UL — SIGNIFICANT CHANGE UP (ref 4.2–5.8)
RBC # BLD: 4.81 M/UL — SIGNIFICANT CHANGE UP (ref 4.2–5.8)
RBC # FLD: 14 % — SIGNIFICANT CHANGE UP (ref 10.3–14.5)
RBC # FLD: 14.2 % — SIGNIFICANT CHANGE UP (ref 10.3–14.5)
RBC # FLD: 14.2 % — SIGNIFICANT CHANGE UP (ref 10.3–14.5)
RBC # FLD: 14.7 % — HIGH (ref 10.3–14.5)
RBC # FLD: 15.2 % — HIGH (ref 10.3–14.5)
RBC # FLD: 15.3 % — HIGH (ref 10.3–14.5)
RBC # FLD: 15.5 % — HIGH (ref 10.3–14.5)
RBC CASTS # UR COMP ASSIST: NEGATIVE /HPF — SIGNIFICANT CHANGE UP (ref 0–4)
RSV RNA NPH QL NAA+NON-PROBE: SIGNIFICANT CHANGE UP
SALICYLATES SERPL-MCNC: <1.7 MG/DL — LOW (ref 2.8–20)
SAO2 % BLDA: 100 % — HIGH (ref 94–98)
SAO2 % BLDA: 97 % — SIGNIFICANT CHANGE UP (ref 94–98)
SAO2 % BLDA: 98 % — SIGNIFICANT CHANGE UP (ref 94–98)
SAO2 % BLDA: 99 % — HIGH (ref 94–98)
SARS-COV-2 RNA SPEC QL NAA+PROBE: SIGNIFICANT CHANGE UP
SMOOTH MUSCLE AB SER-ACNC: ABNORMAL
SODIUM SERPL-SCNC: 131 MMOL/L — LOW (ref 135–145)
SODIUM SERPL-SCNC: 131 MMOL/L — LOW (ref 135–145)
SODIUM SERPL-SCNC: 133 MMOL/L — LOW (ref 135–145)
SODIUM SERPL-SCNC: 135 MMOL/L — SIGNIFICANT CHANGE UP (ref 135–145)
SODIUM SERPL-SCNC: 139 MMOL/L — SIGNIFICANT CHANGE UP (ref 135–145)
SODIUM SERPL-SCNC: 143 MMOL/L — SIGNIFICANT CHANGE UP (ref 135–145)
SODIUM SERPL-SCNC: 143 MMOL/L — SIGNIFICANT CHANGE UP (ref 135–145)
SODIUM SERPL-SCNC: 144 MMOL/L — SIGNIFICANT CHANGE UP (ref 135–145)
SODIUM SERPL-SCNC: 146 MMOL/L — HIGH (ref 135–145)
SODIUM SERPL-SCNC: 147 MMOL/L — HIGH (ref 135–145)
SODIUM SERPL-SCNC: 148 MMOL/L — HIGH (ref 135–145)
SODIUM SERPL-SCNC: 149 MMOL/L — HIGH (ref 135–145)
SODIUM UR-SCNC: <20 MMOL/L — SIGNIFICANT CHANGE UP
SP GR SPEC: 1.02 — SIGNIFICANT CHANGE UP (ref 1.01–1.02)
SPECIMEN SOURCE: SIGNIFICANT CHANGE UP
SPECIMEN SOURCE: SIGNIFICANT CHANGE UP
THC UR QL: NEGATIVE — SIGNIFICANT CHANGE UP
TRIGL SERPL-MCNC: 75 MG/DL — SIGNIFICANT CHANGE UP
TROPONIN I, HIGH SENSITIVITY RESULT: 975.13 NG/L — HIGH
TSH SERPL-MCNC: 6.3 UU/ML — HIGH (ref 0.34–4.82)
TSH SERPL-MCNC: 7.76 UU/ML — HIGH (ref 0.34–4.82)
TSH SERPL-MCNC: 8.13 UU/ML — HIGH (ref 0.34–4.82)
URATE SERPL-MCNC: 12.3 MG/DL — HIGH (ref 3.4–8.8)
URATE SERPL-MCNC: 15.6 MG/DL — HIGH (ref 3.4–8.8)
URATE SERPL-MCNC: 17.3 MG/DL — HIGH (ref 3.4–8.8)
URATE SERPL-MCNC: 7.5 MG/DL — SIGNIFICANT CHANGE UP (ref 3.4–8.8)
URATE SERPL-MCNC: 9.1 MG/DL — HIGH (ref 3.4–8.8)
UROBILINOGEN FLD QL: 1
WBC # BLD: 5.05 K/UL — SIGNIFICANT CHANGE UP (ref 3.8–10.5)
WBC # BLD: 6.3 K/UL — SIGNIFICANT CHANGE UP (ref 3.8–10.5)
WBC # BLD: 7.65 K/UL — SIGNIFICANT CHANGE UP (ref 3.8–10.5)
WBC # BLD: 7.94 K/UL — SIGNIFICANT CHANGE UP (ref 3.8–10.5)
WBC # BLD: 8.35 K/UL — SIGNIFICANT CHANGE UP (ref 3.8–10.5)
WBC # BLD: 9.38 K/UL — SIGNIFICANT CHANGE UP (ref 3.8–10.5)
WBC # BLD: 9.64 K/UL — SIGNIFICANT CHANGE UP (ref 3.8–10.5)
WBC # FLD AUTO: 5.05 K/UL — SIGNIFICANT CHANGE UP (ref 3.8–10.5)
WBC # FLD AUTO: 6.3 K/UL — SIGNIFICANT CHANGE UP (ref 3.8–10.5)
WBC # FLD AUTO: 7.65 K/UL — SIGNIFICANT CHANGE UP (ref 3.8–10.5)
WBC # FLD AUTO: 7.94 K/UL — SIGNIFICANT CHANGE UP (ref 3.8–10.5)
WBC # FLD AUTO: 8.35 K/UL — SIGNIFICANT CHANGE UP (ref 3.8–10.5)
WBC # FLD AUTO: 9.38 K/UL — SIGNIFICANT CHANGE UP (ref 3.8–10.5)
WBC # FLD AUTO: 9.64 K/UL — SIGNIFICANT CHANGE UP (ref 3.8–10.5)
WBC UR QL: SIGNIFICANT CHANGE UP /HPF (ref 0–5)

## 2023-01-01 PROCEDURE — 71045 X-RAY EXAM CHEST 1 VIEW: CPT | Mod: 26

## 2023-01-01 PROCEDURE — 99291 CRITICAL CARE FIRST HOUR: CPT | Mod: 25

## 2023-01-01 PROCEDURE — 99233 SBSQ HOSP IP/OBS HIGH 50: CPT

## 2023-01-01 PROCEDURE — 82728 ASSAY OF FERRITIN: CPT

## 2023-01-01 PROCEDURE — 99291 CRITICAL CARE FIRST HOUR: CPT

## 2023-01-01 PROCEDURE — 86255 FLUORESCENT ANTIBODY SCREEN: CPT

## 2023-01-01 PROCEDURE — 93010 ELECTROCARDIOGRAM REPORT: CPT

## 2023-01-01 PROCEDURE — 86704 HEP B CORE ANTIBODY TOTAL: CPT

## 2023-01-01 PROCEDURE — 80053 COMPREHEN METABOLIC PANEL: CPT

## 2023-01-01 PROCEDURE — 83735 ASSAY OF MAGNESIUM: CPT

## 2023-01-01 PROCEDURE — 84550 ASSAY OF BLOOD/URIC ACID: CPT

## 2023-01-01 PROCEDURE — 93306 TTE W/DOPPLER COMPLETE: CPT | Mod: 26

## 2023-01-01 PROCEDURE — 93306 TTE W/DOPPLER COMPLETE: CPT

## 2023-01-01 PROCEDURE — 82803 BLOOD GASES ANY COMBINATION: CPT

## 2023-01-01 PROCEDURE — 80048 BASIC METABOLIC PNL TOTAL CA: CPT

## 2023-01-01 PROCEDURE — 94640 AIRWAY INHALATION TREATMENT: CPT

## 2023-01-01 PROCEDURE — 93005 ELECTROCARDIOGRAM TRACING: CPT

## 2023-01-01 PROCEDURE — 82570 ASSAY OF URINE CREATININE: CPT

## 2023-01-01 PROCEDURE — 86334 IMMUNOFIX E-PHORESIS SERUM: CPT

## 2023-01-01 PROCEDURE — 99223 1ST HOSP IP/OBS HIGH 75: CPT

## 2023-01-01 PROCEDURE — 84484 ASSAY OF TROPONIN QUANT: CPT

## 2023-01-01 PROCEDURE — 85610 PROTHROMBIN TIME: CPT

## 2023-01-01 PROCEDURE — 86706 HEP B SURFACE ANTIBODY: CPT

## 2023-01-01 PROCEDURE — 84300 ASSAY OF URINE SODIUM: CPT

## 2023-01-01 PROCEDURE — 94003 VENT MGMT INPAT SUBQ DAY: CPT

## 2023-01-01 PROCEDURE — 86708 HEPATITIS A ANTIBODY: CPT

## 2023-01-01 PROCEDURE — 94760 N-INVAS EAR/PLS OXIMETRY 1: CPT

## 2023-01-01 PROCEDURE — 82784 ASSAY IGA/IGD/IGG/IGM EACH: CPT

## 2023-01-01 PROCEDURE — 80061 LIPID PANEL: CPT

## 2023-01-01 PROCEDURE — 87340 HEPATITIS B SURFACE AG IA: CPT

## 2023-01-01 PROCEDURE — 83930 ASSAY OF BLOOD OSMOLALITY: CPT

## 2023-01-01 PROCEDURE — 82330 ASSAY OF CALCIUM: CPT

## 2023-01-01 PROCEDURE — 82140 ASSAY OF AMMONIA: CPT

## 2023-01-01 PROCEDURE — 71045 X-RAY EXAM CHEST 1 VIEW: CPT

## 2023-01-01 PROCEDURE — 86709 HEPATITIS A IGM ANTIBODY: CPT

## 2023-01-01 PROCEDURE — 70450 CT HEAD/BRAIN W/O DYE: CPT | Mod: 26,MA

## 2023-01-01 PROCEDURE — 82310 ASSAY OF CALCIUM: CPT

## 2023-01-01 PROCEDURE — 80076 HEPATIC FUNCTION PANEL: CPT

## 2023-01-01 PROCEDURE — 84443 ASSAY THYROID STIM HORMONE: CPT

## 2023-01-01 PROCEDURE — C9113: CPT

## 2023-01-01 PROCEDURE — 76700 US EXAM ABDOM COMPLETE: CPT

## 2023-01-01 PROCEDURE — 86381 MITOCHONDRIAL ANTIBODY EACH: CPT

## 2023-01-01 PROCEDURE — 87070 CULTURE OTHR SPECIMN AEROBIC: CPT

## 2023-01-01 PROCEDURE — 83970 ASSAY OF PARATHORMONE: CPT

## 2023-01-01 PROCEDURE — 99292 CRITICAL CARE ADDL 30 MIN: CPT | Mod: 25

## 2023-01-01 PROCEDURE — 94002 VENT MGMT INPAT INIT DAY: CPT

## 2023-01-01 PROCEDURE — 83615 LACTATE (LD) (LDH) ENZYME: CPT

## 2023-01-01 PROCEDURE — 85027 COMPLETE CBC AUTOMATED: CPT

## 2023-01-01 PROCEDURE — 80074 ACUTE HEPATITIS PANEL: CPT

## 2023-01-01 PROCEDURE — 86707 HEPATITIS BE ANTIBODY: CPT

## 2023-01-01 PROCEDURE — 99231 SBSQ HOSP IP/OBS SF/LOW 25: CPT

## 2023-01-01 PROCEDURE — 36415 COLL VENOUS BLD VENIPUNCTURE: CPT

## 2023-01-01 PROCEDURE — 87350 HEPATITIS BE AG IA: CPT

## 2023-01-01 PROCEDURE — 82550 ASSAY OF CK (CPK): CPT

## 2023-01-01 PROCEDURE — 99285 EMERGENCY DEPT VISIT HI MDM: CPT

## 2023-01-01 PROCEDURE — 84156 ASSAY OF PROTEIN URINE: CPT

## 2023-01-01 PROCEDURE — 86803 HEPATITIS C AB TEST: CPT

## 2023-01-01 PROCEDURE — 74176 CT ABD & PELVIS W/O CONTRAST: CPT | Mod: 26,MA

## 2023-01-01 PROCEDURE — 31500 INSERT EMERGENCY AIRWAY: CPT

## 2023-01-01 PROCEDURE — 93975 VASCULAR STUDY: CPT

## 2023-01-01 PROCEDURE — 93976 VASCULAR STUDY: CPT | Mod: 26

## 2023-01-01 PROCEDURE — 84100 ASSAY OF PHOSPHORUS: CPT

## 2023-01-01 PROCEDURE — 84166 PROTEIN E-PHORESIS/URINE/CSF: CPT

## 2023-01-01 PROCEDURE — 86705 HEP B CORE ANTIBODY IGM: CPT

## 2023-01-01 PROCEDURE — 76700 US EXAM ABDOM COMPLETE: CPT | Mod: 26,59

## 2023-01-01 PROCEDURE — 36600 WITHDRAWAL OF ARTERIAL BLOOD: CPT

## 2023-01-01 PROCEDURE — 85730 THROMBOPLASTIN TIME PARTIAL: CPT

## 2023-01-01 PROCEDURE — 83605 ASSAY OF LACTIC ACID: CPT

## 2023-01-01 PROCEDURE — 83935 ASSAY OF URINE OSMOLALITY: CPT

## 2023-01-01 RX ORDER — SODIUM CHLORIDE 9 MG/ML
1000 INJECTION INTRAMUSCULAR; INTRAVENOUS; SUBCUTANEOUS
Refills: 0 | Status: DISCONTINUED | OUTPATIENT
Start: 2023-01-01 | End: 2023-01-01

## 2023-01-01 RX ORDER — THIAMINE MONONITRATE (VIT B1) 100 MG
100 TABLET ORAL ONCE
Refills: 0 | Status: COMPLETED | OUTPATIENT
Start: 2023-01-01 | End: 2023-01-01

## 2023-01-01 RX ORDER — AMIODARONE HYDROCHLORIDE 400 MG/1
150 TABLET ORAL ONCE
Refills: 0 | Status: COMPLETED | OUTPATIENT
Start: 2023-01-01 | End: 2023-01-01

## 2023-01-01 RX ORDER — ALBUTEROL 90 UG/1
2 AEROSOL, METERED ORAL
Qty: 0 | Refills: 0 | DISCHARGE

## 2023-01-01 RX ORDER — ROCURONIUM BROMIDE 10 MG/ML
100 VIAL (ML) INTRAVENOUS ONCE
Refills: 0 | Status: COMPLETED | OUTPATIENT
Start: 2023-01-01 | End: 2023-01-01

## 2023-01-01 RX ORDER — DEXMEDETOMIDINE HYDROCHLORIDE IN 0.9% SODIUM CHLORIDE 4 UG/ML
110 INJECTION INTRAVENOUS ONCE
Refills: 0 | Status: DISCONTINUED | OUTPATIENT
Start: 2023-01-01 | End: 2023-01-01

## 2023-01-01 RX ORDER — CEFTRIAXONE 500 MG/1
1000 INJECTION, POWDER, FOR SOLUTION INTRAMUSCULAR; INTRAVENOUS EVERY 24 HOURS
Refills: 0 | Status: DISCONTINUED | OUTPATIENT
Start: 2023-01-01 | End: 2023-01-01

## 2023-01-01 RX ORDER — HYDROMORPHONE HYDROCHLORIDE 2 MG/ML
2 INJECTION INTRAMUSCULAR; INTRAVENOUS; SUBCUTANEOUS ONCE
Refills: 0 | Status: DISCONTINUED | OUTPATIENT
Start: 2023-01-01 | End: 2023-01-01

## 2023-01-01 RX ORDER — LANOLIN ALCOHOL/MO/W.PET/CERES
3 CREAM (GRAM) TOPICAL AT BEDTIME
Refills: 0 | Status: DISCONTINUED | OUTPATIENT
Start: 2023-01-01 | End: 2023-01-01

## 2023-01-01 RX ORDER — ONDANSETRON 8 MG/1
4 TABLET, FILM COATED ORAL EVERY 8 HOURS
Refills: 0 | Status: DISCONTINUED | OUTPATIENT
Start: 2023-01-01 | End: 2023-01-01

## 2023-01-01 RX ORDER — HYDROMORPHONE HYDROCHLORIDE 2 MG/ML
1 INJECTION INTRAMUSCULAR; INTRAVENOUS; SUBCUTANEOUS
Qty: 100 | Refills: 0 | Status: DISCONTINUED | OUTPATIENT
Start: 2023-01-01 | End: 2023-01-01

## 2023-01-01 RX ORDER — OMEGA-3 ACID ETHYL ESTERS 1 G
1 CAPSULE ORAL
Qty: 0 | Refills: 0 | DISCHARGE

## 2023-01-01 RX ORDER — ACETAMINOPHEN 500 MG
650 TABLET ORAL EVERY 6 HOURS
Refills: 0 | Status: DISCONTINUED | OUTPATIENT
Start: 2023-01-01 | End: 2023-01-01

## 2023-01-01 RX ORDER — SODIUM BICARBONATE 1 MEQ/ML
50 SYRINGE (ML) INTRAVENOUS
Refills: 0 | Status: COMPLETED | OUTPATIENT
Start: 2023-01-01 | End: 2023-01-01

## 2023-01-01 RX ORDER — PHYTONADIONE (VIT K1) 5 MG
5 TABLET ORAL DAILY
Refills: 0 | Status: DISCONTINUED | OUTPATIENT
Start: 2023-01-01 | End: 2023-01-01

## 2023-01-01 RX ORDER — LACTULOSE 10 G/15ML
10 SOLUTION ORAL
Refills: 0 | Status: DISCONTINUED | OUTPATIENT
Start: 2023-01-01 | End: 2023-01-01

## 2023-01-01 RX ORDER — LOSARTAN POTASSIUM 100 MG/1
1 TABLET, FILM COATED ORAL
Qty: 0 | Refills: 0 | DISCHARGE

## 2023-01-01 RX ORDER — ALBUTEROL 90 UG/1
2 AEROSOL, METERED ORAL EVERY 6 HOURS
Refills: 0 | Status: DISCONTINUED | OUTPATIENT
Start: 2023-01-01 | End: 2023-01-01

## 2023-01-01 RX ORDER — SODIUM CHLORIDE 9 MG/ML
1000 INJECTION, SOLUTION INTRAVENOUS
Refills: 0 | Status: DISCONTINUED | OUTPATIENT
Start: 2023-01-01 | End: 2023-01-01

## 2023-01-01 RX ORDER — PANTOPRAZOLE SODIUM 20 MG/1
40 TABLET, DELAYED RELEASE ORAL DAILY
Refills: 0 | Status: DISCONTINUED | OUTPATIENT
Start: 2023-01-01 | End: 2023-01-01

## 2023-01-01 RX ORDER — HEPARIN SODIUM 5000 [USP'U]/ML
5000 INJECTION INTRAVENOUS; SUBCUTANEOUS EVERY 8 HOURS
Refills: 0 | Status: DISCONTINUED | OUTPATIENT
Start: 2023-01-01 | End: 2023-01-01

## 2023-01-01 RX ORDER — ZOLPIDEM TARTRATE 10 MG/1
1 TABLET ORAL
Qty: 0 | Refills: 0 | DISCHARGE

## 2023-01-01 RX ORDER — MIDODRINE HYDROCHLORIDE 2.5 MG/1
10 TABLET ORAL EVERY 8 HOURS
Refills: 0 | Status: DISCONTINUED | OUTPATIENT
Start: 2023-01-01 | End: 2023-01-01

## 2023-01-01 RX ORDER — PIPERACILLIN AND TAZOBACTAM 4; .5 G/20ML; G/20ML
3.38 INJECTION, POWDER, LYOPHILIZED, FOR SOLUTION INTRAVENOUS ONCE
Refills: 0 | Status: COMPLETED | OUTPATIENT
Start: 2023-01-01 | End: 2023-01-01

## 2023-01-01 RX ORDER — POTASSIUM PHOSPHATE, MONOBASIC POTASSIUM PHOSPHATE, DIBASIC 236; 224 MG/ML; MG/ML
15 INJECTION, SOLUTION INTRAVENOUS ONCE
Refills: 0 | Status: COMPLETED | OUTPATIENT
Start: 2023-01-01 | End: 2023-01-01

## 2023-01-01 RX ORDER — IPRATROPIUM/ALBUTEROL SULFATE 18-103MCG
3 AEROSOL WITH ADAPTER (GRAM) INHALATION EVERY 6 HOURS
Refills: 0 | Status: DISCONTINUED | OUTPATIENT
Start: 2023-01-01 | End: 2023-01-01

## 2023-01-01 RX ORDER — MIDAZOLAM HYDROCHLORIDE 1 MG/ML
4 INJECTION, SOLUTION INTRAMUSCULAR; INTRAVENOUS ONCE
Refills: 0 | Status: DISCONTINUED | OUTPATIENT
Start: 2023-01-01 | End: 2023-01-01

## 2023-01-01 RX ORDER — VASOPRESSIN 20 [USP'U]/ML
0.04 INJECTION INTRAVENOUS
Qty: 40 | Refills: 0 | Status: DISCONTINUED | OUTPATIENT
Start: 2023-01-01 | End: 2023-01-01

## 2023-01-01 RX ORDER — LEVOTHYROXINE SODIUM 125 MCG
100 TABLET ORAL DAILY
Refills: 0 | Status: DISCONTINUED | OUTPATIENT
Start: 2023-01-01 | End: 2023-01-01

## 2023-01-01 RX ORDER — CHLORHEXIDINE GLUCONATE 213 G/1000ML
1 SOLUTION TOPICAL
Refills: 0 | Status: DISCONTINUED | OUTPATIENT
Start: 2023-01-01 | End: 2023-01-01

## 2023-01-01 RX ORDER — SODIUM CHLORIDE 9 MG/ML
1000 INJECTION INTRAMUSCULAR; INTRAVENOUS; SUBCUTANEOUS ONCE
Refills: 0 | Status: COMPLETED | OUTPATIENT
Start: 2023-01-01 | End: 2023-01-01

## 2023-01-01 RX ORDER — CHLORHEXIDINE GLUCONATE 213 G/1000ML
15 SOLUTION TOPICAL EVERY 12 HOURS
Refills: 0 | Status: DISCONTINUED | OUTPATIENT
Start: 2023-01-01 | End: 2023-01-01

## 2023-01-01 RX ORDER — OMEGA-3 ACID ETHYL ESTERS 1 G
2 CAPSULE ORAL
Refills: 0 | Status: DISCONTINUED | OUTPATIENT
Start: 2023-01-01 | End: 2023-01-01

## 2023-01-01 RX ORDER — SODIUM BICARBONATE 1 MEQ/ML
50 SYRINGE (ML) INTRAVENOUS ONCE
Refills: 0 | Status: COMPLETED | OUTPATIENT
Start: 2023-01-01 | End: 2023-01-01

## 2023-01-01 RX ORDER — SODIUM CHLORIDE 9 MG/ML
10 INJECTION INTRAMUSCULAR; INTRAVENOUS; SUBCUTANEOUS
Refills: 0 | Status: DISCONTINUED | OUTPATIENT
Start: 2023-01-01 | End: 2023-01-01

## 2023-01-01 RX ORDER — METOPROLOL TARTRATE 50 MG
5 TABLET ORAL ONCE
Refills: 0 | Status: COMPLETED | OUTPATIENT
Start: 2023-01-01 | End: 2023-01-01

## 2023-01-01 RX ORDER — FOLIC ACID 0.8 MG
1 TABLET ORAL ONCE
Refills: 0 | Status: COMPLETED | OUTPATIENT
Start: 2023-01-01 | End: 2023-01-01

## 2023-01-01 RX ORDER — NOREPINEPHRINE BITARTRATE/D5W 8 MG/250ML
0.05 PLASTIC BAG, INJECTION (ML) INTRAVENOUS
Qty: 16 | Refills: 0 | Status: DISCONTINUED | OUTPATIENT
Start: 2023-01-01 | End: 2023-01-01

## 2023-01-01 RX ORDER — PHYTONADIONE (VIT K1) 5 MG
5 TABLET ORAL DAILY
Refills: 0 | Status: COMPLETED | OUTPATIENT
Start: 2023-01-01 | End: 2023-01-01

## 2023-01-01 RX ORDER — METOPROLOL TARTRATE 50 MG
2.5 TABLET ORAL EVERY 6 HOURS
Refills: 0 | Status: DISCONTINUED | OUTPATIENT
Start: 2023-01-01 | End: 2023-01-01

## 2023-01-01 RX ORDER — FAMOTIDINE 10 MG/ML
20 INJECTION INTRAVENOUS DAILY
Refills: 0 | Status: DISCONTINUED | OUTPATIENT
Start: 2023-01-01 | End: 2023-01-01

## 2023-01-01 RX ORDER — POTASSIUM CHLORIDE 20 MEQ
40 PACKET (EA) ORAL ONCE
Refills: 0 | Status: COMPLETED | OUTPATIENT
Start: 2023-01-01 | End: 2023-01-01

## 2023-01-01 RX ORDER — METOPROLOL TARTRATE 50 MG
5 TABLET ORAL EVERY 6 HOURS
Refills: 0 | Status: DISCONTINUED | OUTPATIENT
Start: 2023-01-01 | End: 2023-01-01

## 2023-01-01 RX ORDER — ALPRAZOLAM 0.25 MG
1 TABLET ORAL
Qty: 0 | Refills: 0 | DISCHARGE

## 2023-01-01 RX ORDER — LEVOTHYROXINE SODIUM 125 MCG
50 TABLET ORAL AT BEDTIME
Refills: 0 | Status: DISCONTINUED | OUTPATIENT
Start: 2023-01-01 | End: 2023-01-01

## 2023-01-01 RX ORDER — LACTULOSE 10 G/15ML
20 SOLUTION ORAL EVERY 6 HOURS
Refills: 0 | Status: DISCONTINUED | OUTPATIENT
Start: 2023-01-01 | End: 2023-01-01

## 2023-01-01 RX ORDER — AMIODARONE HYDROCHLORIDE 400 MG/1
1 TABLET ORAL
Qty: 900 | Refills: 0 | Status: DISCONTINUED | OUTPATIENT
Start: 2023-01-01 | End: 2023-01-01

## 2023-01-01 RX ORDER — MAGNESIUM SULFATE 500 MG/ML
1 VIAL (ML) INJECTION ONCE
Refills: 0 | Status: COMPLETED | OUTPATIENT
Start: 2023-01-01 | End: 2023-01-01

## 2023-01-01 RX ORDER — NOREPINEPHRINE BITARTRATE/D5W 8 MG/250ML
0.1 PLASTIC BAG, INJECTION (ML) INTRAVENOUS
Qty: 8 | Refills: 0 | Status: DISCONTINUED | OUTPATIENT
Start: 2023-01-01 | End: 2023-01-01

## 2023-01-01 RX ORDER — MEROPENEM 1 G/30ML
1000 INJECTION INTRAVENOUS EVERY 12 HOURS
Refills: 0 | Status: DISCONTINUED | OUTPATIENT
Start: 2023-01-01 | End: 2023-01-01

## 2023-01-01 RX ORDER — HYDRALAZINE HCL 50 MG
10 TABLET ORAL THREE TIMES A DAY
Refills: 0 | Status: DISCONTINUED | OUTPATIENT
Start: 2023-01-01 | End: 2023-01-01

## 2023-01-01 RX ORDER — SODIUM POLYSTYRENE SULFONATE 4.1 MEQ/G
30 POWDER, FOR SUSPENSION ORAL ONCE
Refills: 0 | Status: COMPLETED | OUTPATIENT
Start: 2023-01-01 | End: 2023-01-01

## 2023-01-01 RX ORDER — ROBINUL 0.2 MG/ML
0.2 INJECTION INTRAMUSCULAR; INTRAVENOUS EVERY 4 HOURS
Refills: 0 | Status: DISCONTINUED | OUTPATIENT
Start: 2023-01-01 | End: 2023-01-01

## 2023-01-01 RX ORDER — VANCOMYCIN HCL 1 G
1500 VIAL (EA) INTRAVENOUS ONCE
Refills: 0 | Status: COMPLETED | OUTPATIENT
Start: 2023-01-01 | End: 2023-01-01

## 2023-01-01 RX ORDER — HYDROMORPHONE HYDROCHLORIDE 2 MG/ML
1 INJECTION INTRAMUSCULAR; INTRAVENOUS; SUBCUTANEOUS
Qty: 10 | Refills: 0 | Status: DISCONTINUED | OUTPATIENT
Start: 2023-01-01 | End: 2023-01-01

## 2023-01-01 RX ORDER — MIDAZOLAM HYDROCHLORIDE 1 MG/ML
2 INJECTION, SOLUTION INTRAMUSCULAR; INTRAVENOUS
Refills: 0 | Status: DISCONTINUED | OUTPATIENT
Start: 2023-01-01 | End: 2023-01-01

## 2023-01-01 RX ORDER — DEXMEDETOMIDINE HYDROCHLORIDE IN 0.9% SODIUM CHLORIDE 4 UG/ML
0.7 INJECTION INTRAVENOUS
Qty: 200 | Refills: 0 | Status: DISCONTINUED | OUTPATIENT
Start: 2023-01-01 | End: 2023-01-01

## 2023-01-01 RX ORDER — FENTANYL CITRATE 50 UG/ML
0.5 INJECTION INTRAVENOUS
Qty: 2500 | Refills: 0 | Status: DISCONTINUED | OUTPATIENT
Start: 2023-01-01 | End: 2023-01-01

## 2023-01-01 RX ORDER — ROSUVASTATIN CALCIUM 5 MG/1
1 TABLET ORAL
Qty: 0 | Refills: 0 | DISCHARGE

## 2023-01-01 RX ORDER — FOLIC ACID 0.8 MG
1 TABLET ORAL DAILY
Refills: 0 | Status: DISCONTINUED | OUTPATIENT
Start: 2023-01-01 | End: 2023-01-01

## 2023-01-01 RX ORDER — PHENYLEPHRINE HYDROCHLORIDE 10 MG/ML
0.2 INJECTION INTRAVENOUS
Qty: 40 | Refills: 0 | Status: DISCONTINUED | OUTPATIENT
Start: 2023-01-01 | End: 2023-01-01

## 2023-01-01 RX ORDER — SODIUM CHLORIDE 9 MG/ML
4 INJECTION INTRAMUSCULAR; INTRAVENOUS; SUBCUTANEOUS EVERY 6 HOURS
Refills: 0 | Status: DISCONTINUED | OUTPATIENT
Start: 2023-01-01 | End: 2023-01-01

## 2023-01-01 RX ORDER — AMIODARONE HYDROCHLORIDE 400 MG/1
150 TABLET ORAL ONCE
Refills: 0 | Status: DISCONTINUED | OUTPATIENT
Start: 2023-01-01 | End: 2023-01-01

## 2023-01-01 RX ORDER — FUROSEMIDE 40 MG
20 TABLET ORAL ONCE
Refills: 0 | Status: COMPLETED | OUTPATIENT
Start: 2023-01-01 | End: 2023-01-01

## 2023-01-01 RX ORDER — LEVOTHYROXINE SODIUM 125 MCG
1 TABLET ORAL
Qty: 0 | Refills: 0 | DISCHARGE

## 2023-01-01 RX ORDER — ALLOPURINOL 300 MG
200 TABLET ORAL DAILY
Refills: 0 | Status: DISCONTINUED | OUTPATIENT
Start: 2023-01-01 | End: 2023-01-01

## 2023-01-01 RX ORDER — MAGNESIUM SULFATE 500 MG/ML
2 VIAL (ML) INJECTION ONCE
Refills: 0 | Status: COMPLETED | OUTPATIENT
Start: 2023-01-01 | End: 2023-01-01

## 2023-01-01 RX ORDER — PHENYLEPHRINE HYDROCHLORIDE 10 MG/ML
0.5 INJECTION INTRAVENOUS
Qty: 160 | Refills: 0 | Status: DISCONTINUED | OUTPATIENT
Start: 2023-01-01 | End: 2023-01-01

## 2023-01-01 RX ORDER — FENTANYL CITRATE 50 UG/ML
50 INJECTION INTRAVENOUS ONCE
Refills: 0 | Status: DISCONTINUED | OUTPATIENT
Start: 2023-01-01 | End: 2023-01-01

## 2023-01-01 RX ORDER — DEXMEDETOMIDINE HYDROCHLORIDE IN 0.9% SODIUM CHLORIDE 4 UG/ML
0.6 INJECTION INTRAVENOUS
Qty: 400 | Refills: 0 | Status: DISCONTINUED | OUTPATIENT
Start: 2023-01-01 | End: 2023-01-01

## 2023-01-01 RX ORDER — THIAMINE MONONITRATE (VIT B1) 100 MG
100 TABLET ORAL DAILY
Refills: 0 | Status: DISCONTINUED | OUTPATIENT
Start: 2023-01-01 | End: 2023-01-01

## 2023-01-01 RX ORDER — ACETAMINOPHEN 500 MG
1000 TABLET ORAL ONCE
Refills: 0 | Status: COMPLETED | OUTPATIENT
Start: 2023-01-01 | End: 2023-01-01

## 2023-01-01 RX ORDER — THIAMINE MONONITRATE (VIT B1) 100 MG
500 TABLET ORAL EVERY 8 HOURS
Refills: 0 | Status: COMPLETED | OUTPATIENT
Start: 2023-01-01 | End: 2023-01-01

## 2023-01-01 RX ORDER — SODIUM BICARBONATE 1 MEQ/ML
0.21 SYRINGE (ML) INTRAVENOUS
Qty: 150 | Refills: 0 | Status: DISCONTINUED | OUTPATIENT
Start: 2023-01-01 | End: 2023-01-01

## 2023-01-01 RX ORDER — FENTANYL CITRATE 50 UG/ML
50 INJECTION INTRAVENOUS
Refills: 0 | Status: DISCONTINUED | OUTPATIENT
Start: 2023-01-01 | End: 2023-01-01

## 2023-01-01 RX ORDER — PIPERACILLIN AND TAZOBACTAM 4; .5 G/20ML; G/20ML
3.38 INJECTION, POWDER, LYOPHILIZED, FOR SOLUTION INTRAVENOUS EVERY 8 HOURS
Refills: 0 | Status: DISCONTINUED | OUTPATIENT
Start: 2023-01-01 | End: 2023-01-01

## 2023-01-01 RX ORDER — MIDAZOLAM HYDROCHLORIDE 1 MG/ML
2 INJECTION, SOLUTION INTRAMUSCULAR; INTRAVENOUS EVERY 4 HOURS
Refills: 0 | Status: DISCONTINUED | OUTPATIENT
Start: 2023-01-01 | End: 2023-01-01

## 2023-01-01 RX ORDER — HYDROCORTISONE 20 MG
50 TABLET ORAL EVERY 6 HOURS
Refills: 0 | Status: DISCONTINUED | OUTPATIENT
Start: 2023-01-01 | End: 2023-01-01

## 2023-01-01 RX ORDER — ALLOPURINOL 300 MG
100 TABLET ORAL DAILY
Refills: 0 | Status: DISCONTINUED | OUTPATIENT
Start: 2023-01-01 | End: 2023-01-01

## 2023-01-01 RX ADMIN — Medication 25 MILLIGRAM(S): at 23:17

## 2023-01-01 RX ADMIN — SODIUM CHLORIDE 75 MILLILITER(S): 9 INJECTION, SOLUTION INTRAVENOUS at 00:26

## 2023-01-01 RX ADMIN — MIDODRINE HYDROCHLORIDE 10 MILLIGRAM(S): 2.5 TABLET ORAL at 17:42

## 2023-01-01 RX ADMIN — Medication 25 MILLIGRAM(S): at 15:04

## 2023-01-01 RX ADMIN — Medication 1 MILLIGRAM(S): at 10:52

## 2023-01-01 RX ADMIN — Medication 25 GRAM(S): at 04:34

## 2023-01-01 RX ADMIN — MIDODRINE HYDROCHLORIDE 10 MILLIGRAM(S): 2.5 TABLET ORAL at 02:26

## 2023-01-01 RX ADMIN — Medication 25 MILLIGRAM(S): at 23:34

## 2023-01-01 RX ADMIN — Medication 1 TABLET(S): at 21:22

## 2023-01-01 RX ADMIN — HEPARIN SODIUM 5000 UNIT(S): 5000 INJECTION INTRAVENOUS; SUBCUTANEOUS at 15:10

## 2023-01-01 RX ADMIN — LACTULOSE 20 GRAM(S): 10 SOLUTION ORAL at 11:52

## 2023-01-01 RX ADMIN — DEXMEDETOMIDINE HYDROCHLORIDE IN 0.9% SODIUM CHLORIDE 16.3 MICROGRAM(S)/KG/HR: 4 INJECTION INTRAVENOUS at 02:26

## 2023-01-01 RX ADMIN — LACTULOSE 10 GRAM(S): 10 SOLUTION ORAL at 22:19

## 2023-01-01 RX ADMIN — DEXMEDETOMIDINE HYDROCHLORIDE IN 0.9% SODIUM CHLORIDE 19.1 MICROGRAM(S)/KG/HR: 4 INJECTION INTRAVENOUS at 10:30

## 2023-01-01 RX ADMIN — Medication 5 MILLIGRAM(S): at 05:27

## 2023-01-01 RX ADMIN — DEXMEDETOMIDINE HYDROCHLORIDE IN 0.9% SODIUM CHLORIDE 19.1 MICROGRAM(S)/KG/HR: 4 INJECTION INTRAVENOUS at 05:27

## 2023-01-01 RX ADMIN — HEPARIN SODIUM 5000 UNIT(S): 5000 INJECTION INTRAVENOUS; SUBCUTANEOUS at 21:27

## 2023-01-01 RX ADMIN — MIDODRINE HYDROCHLORIDE 10 MILLIGRAM(S): 2.5 TABLET ORAL at 09:54

## 2023-01-01 RX ADMIN — MIDODRINE HYDROCHLORIDE 10 MILLIGRAM(S): 2.5 TABLET ORAL at 01:38

## 2023-01-01 RX ADMIN — HEPARIN SODIUM 5000 UNIT(S): 5000 INJECTION INTRAVENOUS; SUBCUTANEOUS at 05:45

## 2023-01-01 RX ADMIN — MIDODRINE HYDROCHLORIDE 10 MILLIGRAM(S): 2.5 TABLET ORAL at 10:40

## 2023-01-01 RX ADMIN — Medication 100 MILLIGRAM(S): at 10:17

## 2023-01-01 RX ADMIN — HEPARIN SODIUM 5000 UNIT(S): 5000 INJECTION INTRAVENOUS; SUBCUTANEOUS at 06:29

## 2023-01-01 RX ADMIN — CHLORHEXIDINE GLUCONATE 15 MILLILITER(S): 213 SOLUTION TOPICAL at 10:04

## 2023-01-01 RX ADMIN — AMIODARONE HYDROCHLORIDE 33.3 MG/MIN: 400 TABLET ORAL at 17:33

## 2023-01-01 RX ADMIN — HEPARIN SODIUM 5000 UNIT(S): 5000 INJECTION INTRAVENOUS; SUBCUTANEOUS at 05:28

## 2023-01-01 RX ADMIN — Medication 255 MILLIGRAM(S): at 05:45

## 2023-01-01 RX ADMIN — Medication 2 MILLIGRAM(S): at 21:27

## 2023-01-01 RX ADMIN — LACTULOSE 20 GRAM(S): 10 SOLUTION ORAL at 23:47

## 2023-01-01 RX ADMIN — DEXMEDETOMIDINE HYDROCHLORIDE IN 0.9% SODIUM CHLORIDE 16.3 MICROGRAM(S)/KG/HR: 4 INJECTION INTRAVENOUS at 11:24

## 2023-01-01 RX ADMIN — CHLORHEXIDINE GLUCONATE 1 APPLICATION(S): 213 SOLUTION TOPICAL at 05:25

## 2023-01-01 RX ADMIN — FENTANYL CITRATE 5.45 MICROGRAM(S)/KG/HR: 50 INJECTION INTRAVENOUS at 01:29

## 2023-01-01 RX ADMIN — Medication 1 MILLIGRAM(S): at 21:22

## 2023-01-01 RX ADMIN — Medication 5 MILLIGRAM(S): at 20:36

## 2023-01-01 RX ADMIN — Medication 25 MILLIGRAM(S): at 11:04

## 2023-01-01 RX ADMIN — Medication 255 MILLIGRAM(S): at 21:22

## 2023-01-01 RX ADMIN — PIPERACILLIN AND TAZOBACTAM 25 GRAM(S): 4; .5 INJECTION, POWDER, LYOPHILIZED, FOR SOLUTION INTRAVENOUS at 21:57

## 2023-01-01 RX ADMIN — Medication 20.4 MICROGRAM(S)/KG/MIN: at 16:48

## 2023-01-01 RX ADMIN — Medication 200 MILLIGRAM(S): at 12:27

## 2023-01-01 RX ADMIN — Medication 255 MILLIGRAM(S): at 13:44

## 2023-01-01 RX ADMIN — Medication 5 MILLIGRAM(S): at 00:52

## 2023-01-01 RX ADMIN — HEPARIN SODIUM 5000 UNIT(S): 5000 INJECTION INTRAVENOUS; SUBCUTANEOUS at 06:37

## 2023-01-01 RX ADMIN — Medication 2 MILLIGRAM(S): at 06:08

## 2023-01-01 RX ADMIN — LACTULOSE 20 GRAM(S): 10 SOLUTION ORAL at 17:21

## 2023-01-01 RX ADMIN — CHLORHEXIDINE GLUCONATE 1 APPLICATION(S): 213 SOLUTION TOPICAL at 16:49

## 2023-01-01 RX ADMIN — DEXMEDETOMIDINE HYDROCHLORIDE IN 0.9% SODIUM CHLORIDE 16.3 MICROGRAM(S)/KG/HR: 4 INJECTION INTRAVENOUS at 02:04

## 2023-01-01 RX ADMIN — MIDODRINE HYDROCHLORIDE 10 MILLIGRAM(S): 2.5 TABLET ORAL at 13:46

## 2023-01-01 RX ADMIN — Medication 25 MILLIGRAM(S): at 11:09

## 2023-01-01 RX ADMIN — DEXMEDETOMIDINE HYDROCHLORIDE IN 0.9% SODIUM CHLORIDE 16.3 MICROGRAM(S)/KG/HR: 4 INJECTION INTRAVENOUS at 05:57

## 2023-01-01 RX ADMIN — Medication 300 MILLIGRAM(S): at 05:56

## 2023-01-01 RX ADMIN — LACTULOSE 20 GRAM(S): 10 SOLUTION ORAL at 23:17

## 2023-01-01 RX ADMIN — Medication 100 MICROGRAM(S): at 05:58

## 2023-01-01 RX ADMIN — SODIUM CHLORIDE 1000 MILLILITER(S): 9 INJECTION INTRAMUSCULAR; INTRAVENOUS; SUBCUTANEOUS at 10:00

## 2023-01-01 RX ADMIN — HYDROMORPHONE HYDROCHLORIDE 5 MG/HR: 2 INJECTION INTRAMUSCULAR; INTRAVENOUS; SUBCUTANEOUS at 23:36

## 2023-01-01 RX ADMIN — Medication 25 MILLIGRAM(S): at 17:21

## 2023-01-01 RX ADMIN — Medication 75 MEQ/KG/HR: at 10:59

## 2023-01-01 RX ADMIN — SODIUM CHLORIDE 75 MILLILITER(S): 9 INJECTION, SOLUTION INTRAVENOUS at 22:31

## 2023-01-01 RX ADMIN — Medication 50 MILLIGRAM(S): at 18:27

## 2023-01-01 RX ADMIN — Medication 50 MILLIEQUIVALENT(S): at 13:41

## 2023-01-01 RX ADMIN — Medication 25 MILLIGRAM(S): at 17:40

## 2023-01-01 RX ADMIN — LACTULOSE 20 GRAM(S): 10 SOLUTION ORAL at 11:09

## 2023-01-01 RX ADMIN — DEXMEDETOMIDINE HYDROCHLORIDE IN 0.9% SODIUM CHLORIDE 19.1 MICROGRAM(S)/KG/HR: 4 INJECTION INTRAVENOUS at 08:37

## 2023-01-01 RX ADMIN — LACTULOSE 10 GRAM(S): 10 SOLUTION ORAL at 21:56

## 2023-01-01 RX ADMIN — Medication 100 MICROGRAM(S): at 06:29

## 2023-01-01 RX ADMIN — LACTULOSE 20 GRAM(S): 10 SOLUTION ORAL at 17:37

## 2023-01-01 RX ADMIN — Medication 2 MILLIGRAM(S): at 21:59

## 2023-01-01 RX ADMIN — Medication 1 MILLIGRAM(S): at 14:34

## 2023-01-01 RX ADMIN — PANTOPRAZOLE SODIUM 40 MILLIGRAM(S): 20 TABLET, DELAYED RELEASE ORAL at 11:02

## 2023-01-01 RX ADMIN — Medication 20.4 MICROGRAM(S)/KG/MIN: at 10:40

## 2023-01-01 RX ADMIN — HEPARIN SODIUM 5000 UNIT(S): 5000 INJECTION INTRAVENOUS; SUBCUTANEOUS at 12:28

## 2023-01-01 RX ADMIN — CEFTRIAXONE 1000 MILLIGRAM(S): 500 INJECTION, POWDER, FOR SOLUTION INTRAMUSCULAR; INTRAVENOUS at 14:31

## 2023-01-01 RX ADMIN — Medication 5 MILLIGRAM(S): at 01:34

## 2023-01-01 RX ADMIN — Medication 2 MILLIGRAM(S): at 09:30

## 2023-01-01 RX ADMIN — DEXMEDETOMIDINE HYDROCHLORIDE IN 0.9% SODIUM CHLORIDE 16.3 MICROGRAM(S)/KG/HR: 4 INJECTION INTRAVENOUS at 09:15

## 2023-01-01 RX ADMIN — PIPERACILLIN AND TAZOBACTAM 25 GRAM(S): 4; .5 INJECTION, POWDER, LYOPHILIZED, FOR SOLUTION INTRAVENOUS at 05:45

## 2023-01-01 RX ADMIN — DEXMEDETOMIDINE HYDROCHLORIDE IN 0.9% SODIUM CHLORIDE 16.3 MICROGRAM(S)/KG/HR: 4 INJECTION INTRAVENOUS at 04:20

## 2023-01-01 RX ADMIN — MEROPENEM 100 MILLIGRAM(S): 1 INJECTION INTRAVENOUS at 18:27

## 2023-01-01 RX ADMIN — LACTULOSE 20 GRAM(S): 10 SOLUTION ORAL at 12:14

## 2023-01-01 RX ADMIN — LACTULOSE 20 GRAM(S): 10 SOLUTION ORAL at 12:27

## 2023-01-01 RX ADMIN — PANTOPRAZOLE SODIUM 40 MILLIGRAM(S): 20 TABLET, DELAYED RELEASE ORAL at 11:11

## 2023-01-01 RX ADMIN — MIDODRINE HYDROCHLORIDE 10 MILLIGRAM(S): 2.5 TABLET ORAL at 17:40

## 2023-01-01 RX ADMIN — Medication 255 MILLIGRAM(S): at 14:39

## 2023-01-01 RX ADMIN — DEXMEDETOMIDINE HYDROCHLORIDE IN 0.9% SODIUM CHLORIDE 16.3 MICROGRAM(S)/KG/HR: 4 INJECTION INTRAVENOUS at 18:59

## 2023-01-01 RX ADMIN — DEXMEDETOMIDINE HYDROCHLORIDE IN 0.9% SODIUM CHLORIDE 16.3 MICROGRAM(S)/KG/HR: 4 INJECTION INTRAVENOUS at 23:47

## 2023-01-01 RX ADMIN — LACTULOSE 20 GRAM(S): 10 SOLUTION ORAL at 23:34

## 2023-01-01 RX ADMIN — PIPERACILLIN AND TAZOBACTAM 200 GRAM(S): 4; .5 INJECTION, POWDER, LYOPHILIZED, FOR SOLUTION INTRAVENOUS at 16:49

## 2023-01-01 RX ADMIN — Medication 1 MILLIGRAM(S): at 10:17

## 2023-01-01 RX ADMIN — PANTOPRAZOLE SODIUM 40 MILLIGRAM(S): 20 TABLET, DELAYED RELEASE ORAL at 09:55

## 2023-01-01 RX ADMIN — Medication 2 MILLIGRAM(S): at 15:44

## 2023-01-01 RX ADMIN — DEXMEDETOMIDINE HYDROCHLORIDE IN 0.9% SODIUM CHLORIDE 16.3 MICROGRAM(S)/KG/HR: 4 INJECTION INTRAVENOUS at 16:00

## 2023-01-01 RX ADMIN — Medication 200 MILLIGRAM(S): at 09:54

## 2023-01-01 RX ADMIN — Medication 100 MILLIGRAM(S): at 04:31

## 2023-01-01 RX ADMIN — Medication 1 MILLIGRAM(S): at 10:04

## 2023-01-01 RX ADMIN — PIPERACILLIN AND TAZOBACTAM 25 GRAM(S): 4; .5 INJECTION, POWDER, LYOPHILIZED, FOR SOLUTION INTRAVENOUS at 21:26

## 2023-01-01 RX ADMIN — CHLORHEXIDINE GLUCONATE 15 MILLILITER(S): 213 SOLUTION TOPICAL at 22:21

## 2023-01-01 RX ADMIN — PIPERACILLIN AND TAZOBACTAM 25 GRAM(S): 4; .5 INJECTION, POWDER, LYOPHILIZED, FOR SOLUTION INTRAVENOUS at 21:22

## 2023-01-01 RX ADMIN — DEXMEDETOMIDINE HYDROCHLORIDE IN 0.9% SODIUM CHLORIDE 16.3 MICROGRAM(S)/KG/HR: 4 INJECTION INTRAVENOUS at 18:24

## 2023-01-01 RX ADMIN — VASOPRESSIN 6 UNIT(S)/MIN: 20 INJECTION INTRAVENOUS at 13:40

## 2023-01-01 RX ADMIN — LACTULOSE 20 GRAM(S): 10 SOLUTION ORAL at 11:54

## 2023-01-01 RX ADMIN — Medication 20.4 MICROGRAM(S)/KG/MIN: at 20:41

## 2023-01-01 RX ADMIN — CHLORHEXIDINE GLUCONATE 1 APPLICATION(S): 213 SOLUTION TOPICAL at 06:40

## 2023-01-01 RX ADMIN — PIPERACILLIN AND TAZOBACTAM 25 GRAM(S): 4; .5 INJECTION, POWDER, LYOPHILIZED, FOR SOLUTION INTRAVENOUS at 05:57

## 2023-01-01 RX ADMIN — HEPARIN SODIUM 5000 UNIT(S): 5000 INJECTION INTRAVENOUS; SUBCUTANEOUS at 15:03

## 2023-01-01 RX ADMIN — Medication 100 GRAM(S): at 23:48

## 2023-01-01 RX ADMIN — CHLORHEXIDINE GLUCONATE 15 MILLILITER(S): 213 SOLUTION TOPICAL at 10:51

## 2023-01-01 RX ADMIN — DEXMEDETOMIDINE HYDROCHLORIDE IN 0.9% SODIUM CHLORIDE 16.3 MICROGRAM(S)/KG/HR: 4 INJECTION INTRAVENOUS at 22:01

## 2023-01-01 RX ADMIN — CHLORHEXIDINE GLUCONATE 15 MILLILITER(S): 213 SOLUTION TOPICAL at 09:54

## 2023-01-01 RX ADMIN — Medication 20.4 MICROGRAM(S)/KG/MIN: at 05:57

## 2023-01-01 RX ADMIN — HEPARIN SODIUM 5000 UNIT(S): 5000 INJECTION INTRAVENOUS; SUBCUTANEOUS at 06:08

## 2023-01-01 RX ADMIN — DEXMEDETOMIDINE HYDROCHLORIDE IN 0.9% SODIUM CHLORIDE 16.3 MICROGRAM(S)/KG/HR: 4 INJECTION INTRAVENOUS at 09:19

## 2023-01-01 RX ADMIN — MEROPENEM 100 MILLIGRAM(S): 1 INJECTION INTRAVENOUS at 05:56

## 2023-01-01 RX ADMIN — Medication 25 MILLIGRAM(S): at 05:06

## 2023-01-01 RX ADMIN — LACTULOSE 20 GRAM(S): 10 SOLUTION ORAL at 18:27

## 2023-01-01 RX ADMIN — Medication 2 MILLIGRAM(S): at 05:06

## 2023-01-01 RX ADMIN — HEPARIN SODIUM 5000 UNIT(S): 5000 INJECTION INTRAVENOUS; SUBCUTANEOUS at 21:55

## 2023-01-01 RX ADMIN — Medication 150 MEQ/KG/HR: at 13:41

## 2023-01-01 RX ADMIN — CHLORHEXIDINE GLUCONATE 15 MILLILITER(S): 213 SOLUTION TOPICAL at 10:39

## 2023-01-01 RX ADMIN — Medication 20.4 MICROGRAM(S)/KG/MIN: at 00:41

## 2023-01-01 RX ADMIN — PANTOPRAZOLE SODIUM 40 MILLIGRAM(S): 20 TABLET, DELAYED RELEASE ORAL at 10:04

## 2023-01-01 RX ADMIN — LACTULOSE 10 GRAM(S): 10 SOLUTION ORAL at 11:03

## 2023-01-01 RX ADMIN — SODIUM CHLORIDE 125 MILLILITER(S): 9 INJECTION INTRAMUSCULAR; INTRAVENOUS; SUBCUTANEOUS at 10:33

## 2023-01-01 RX ADMIN — HEPARIN SODIUM 5000 UNIT(S): 5000 INJECTION INTRAVENOUS; SUBCUTANEOUS at 21:31

## 2023-01-01 RX ADMIN — Medication 50 MILLIEQUIVALENT(S): at 00:26

## 2023-01-01 RX ADMIN — PHENYLEPHRINE HYDROCHLORIDE 10.2 MICROGRAM(S)/KG/MIN: 10 INJECTION INTRAVENOUS at 11:00

## 2023-01-01 RX ADMIN — PIPERACILLIN AND TAZOBACTAM 25 GRAM(S): 4; .5 INJECTION, POWDER, LYOPHILIZED, FOR SOLUTION INTRAVENOUS at 00:57

## 2023-01-01 RX ADMIN — Medication 2 MILLIGRAM(S): at 16:56

## 2023-01-01 RX ADMIN — CHLORHEXIDINE GLUCONATE 15 MILLILITER(S): 213 SOLUTION TOPICAL at 21:27

## 2023-01-01 RX ADMIN — ROBINUL 0.2 MILLIGRAM(S): 0.2 INJECTION INTRAMUSCULAR; INTRAVENOUS at 23:36

## 2023-01-01 RX ADMIN — DEXMEDETOMIDINE HYDROCHLORIDE IN 0.9% SODIUM CHLORIDE 16.3 MICROGRAM(S)/KG/HR: 4 INJECTION INTRAVENOUS at 15:44

## 2023-01-01 RX ADMIN — PIPERACILLIN AND TAZOBACTAM 25 GRAM(S): 4; .5 INJECTION, POWDER, LYOPHILIZED, FOR SOLUTION INTRAVENOUS at 05:28

## 2023-01-01 RX ADMIN — MIDODRINE HYDROCHLORIDE 10 MILLIGRAM(S): 2.5 TABLET ORAL at 17:37

## 2023-01-01 RX ADMIN — Medication 2 MILLIGRAM(S): at 11:34

## 2023-01-01 RX ADMIN — CHLORHEXIDINE GLUCONATE 15 MILLILITER(S): 213 SOLUTION TOPICAL at 11:01

## 2023-01-01 RX ADMIN — MIDODRINE HYDROCHLORIDE 10 MILLIGRAM(S): 2.5 TABLET ORAL at 00:52

## 2023-01-01 RX ADMIN — POTASSIUM PHOSPHATE, MONOBASIC POTASSIUM PHOSPHATE, DIBASIC 62.5 MILLIMOLE(S): 236; 224 INJECTION, SOLUTION INTRAVENOUS at 11:02

## 2023-01-01 RX ADMIN — DEXMEDETOMIDINE HYDROCHLORIDE IN 0.9% SODIUM CHLORIDE 16.3 MICROGRAM(S)/KG/HR: 4 INJECTION INTRAVENOUS at 21:19

## 2023-01-01 RX ADMIN — LACTULOSE 10 GRAM(S): 10 SOLUTION ORAL at 10:04

## 2023-01-01 RX ADMIN — PIPERACILLIN AND TAZOBACTAM 25 GRAM(S): 4; .5 INJECTION, POWDER, LYOPHILIZED, FOR SOLUTION INTRAVENOUS at 13:44

## 2023-01-01 RX ADMIN — PIPERACILLIN AND TAZOBACTAM 25 GRAM(S): 4; .5 INJECTION, POWDER, LYOPHILIZED, FOR SOLUTION INTRAVENOUS at 13:28

## 2023-01-01 RX ADMIN — MIDODRINE HYDROCHLORIDE 10 MILLIGRAM(S): 2.5 TABLET ORAL at 01:35

## 2023-01-01 RX ADMIN — Medication 255 MILLIGRAM(S): at 05:19

## 2023-01-01 RX ADMIN — PIPERACILLIN AND TAZOBACTAM 25 GRAM(S): 4; .5 INJECTION, POWDER, LYOPHILIZED, FOR SOLUTION INTRAVENOUS at 14:56

## 2023-01-01 RX ADMIN — LACTULOSE 20 GRAM(S): 10 SOLUTION ORAL at 06:28

## 2023-01-01 RX ADMIN — CHLORHEXIDINE GLUCONATE 1 APPLICATION(S): 213 SOLUTION TOPICAL at 07:48

## 2023-01-01 RX ADMIN — Medication 2 MILLIGRAM(S): at 02:33

## 2023-01-01 RX ADMIN — Medication 25 MILLIGRAM(S): at 17:37

## 2023-01-01 RX ADMIN — Medication 255 MILLIGRAM(S): at 22:39

## 2023-01-01 RX ADMIN — PHENYLEPHRINE HYDROCHLORIDE 8.17 MICROGRAM(S)/KG/MIN: 10 INJECTION INTRAVENOUS at 00:26

## 2023-01-01 RX ADMIN — Medication 5 MILLIGRAM(S): at 07:22

## 2023-01-01 RX ADMIN — Medication 25 MILLIGRAM(S): at 00:52

## 2023-01-01 RX ADMIN — CHLORHEXIDINE GLUCONATE 1 APPLICATION(S): 213 SOLUTION TOPICAL at 06:01

## 2023-01-01 RX ADMIN — SODIUM CHLORIDE 4 MILLILITER(S): 9 INJECTION INTRAMUSCULAR; INTRAVENOUS; SUBCUTANEOUS at 19:36

## 2023-01-01 RX ADMIN — HEPARIN SODIUM 5000 UNIT(S): 5000 INJECTION INTRAVENOUS; SUBCUTANEOUS at 13:46

## 2023-01-01 RX ADMIN — PIPERACILLIN AND TAZOBACTAM 25 GRAM(S): 4; .5 INJECTION, POWDER, LYOPHILIZED, FOR SOLUTION INTRAVENOUS at 05:19

## 2023-01-01 RX ADMIN — MIDODRINE HYDROCHLORIDE 10 MILLIGRAM(S): 2.5 TABLET ORAL at 10:06

## 2023-01-01 RX ADMIN — Medication 25 MILLIGRAM(S): at 12:28

## 2023-01-01 RX ADMIN — Medication 100 MICROGRAM(S): at 05:28

## 2023-01-01 RX ADMIN — Medication 5.11 MICROGRAM(S)/KG/MIN: at 10:59

## 2023-01-01 RX ADMIN — MIDAZOLAM HYDROCHLORIDE 4 MILLIGRAM(S): 1 INJECTION, SOLUTION INTRAMUSCULAR; INTRAVENOUS at 05:59

## 2023-01-01 RX ADMIN — Medication 400 MILLIGRAM(S): at 08:17

## 2023-01-01 RX ADMIN — HEPARIN SODIUM 5000 UNIT(S): 5000 INJECTION INTRAVENOUS; SUBCUTANEOUS at 21:56

## 2023-01-01 RX ADMIN — DEXMEDETOMIDINE HYDROCHLORIDE IN 0.9% SODIUM CHLORIDE 16.3 MICROGRAM(S)/KG/HR: 4 INJECTION INTRAVENOUS at 23:41

## 2023-01-01 RX ADMIN — Medication 50 MILLIEQUIVALENT(S): at 13:22

## 2023-01-01 RX ADMIN — Medication 1 TABLET(S): at 10:17

## 2023-01-01 RX ADMIN — DEXMEDETOMIDINE HYDROCHLORIDE IN 0.9% SODIUM CHLORIDE 16.3 MICROGRAM(S)/KG/HR: 4 INJECTION INTRAVENOUS at 03:12

## 2023-01-01 RX ADMIN — Medication 2 MILLIGRAM(S): at 19:50

## 2023-01-01 RX ADMIN — Medication 25 MILLIGRAM(S): at 17:29

## 2023-01-01 RX ADMIN — CHLORHEXIDINE GLUCONATE 15 MILLILITER(S): 213 SOLUTION TOPICAL at 21:55

## 2023-01-01 RX ADMIN — CHLORHEXIDINE GLUCONATE 15 MILLILITER(S): 213 SOLUTION TOPICAL at 11:03

## 2023-01-01 RX ADMIN — LACTULOSE 20 GRAM(S): 10 SOLUTION ORAL at 05:58

## 2023-01-01 RX ADMIN — HEPARIN SODIUM 5000 UNIT(S): 5000 INJECTION INTRAVENOUS; SUBCUTANEOUS at 05:58

## 2023-01-01 RX ADMIN — CHLORHEXIDINE GLUCONATE 15 MILLILITER(S): 213 SOLUTION TOPICAL at 21:31

## 2023-01-01 RX ADMIN — AMIODARONE HYDROCHLORIDE 618 MILLIGRAM(S): 400 TABLET ORAL at 21:26

## 2023-01-01 RX ADMIN — MIDODRINE HYDROCHLORIDE 10 MILLIGRAM(S): 2.5 TABLET ORAL at 11:02

## 2023-01-01 RX ADMIN — Medication 1 MILLIGRAM(S): at 11:02

## 2023-01-01 RX ADMIN — Medication 5 MILLIGRAM(S): at 00:34

## 2023-01-01 RX ADMIN — Medication 1 MILLIGRAM(S): at 13:39

## 2023-01-01 RX ADMIN — Medication 255 MILLIGRAM(S): at 16:49

## 2023-01-01 RX ADMIN — PIPERACILLIN AND TAZOBACTAM 25 GRAM(S): 4; .5 INJECTION, POWDER, LYOPHILIZED, FOR SOLUTION INTRAVENOUS at 20:19

## 2023-01-01 RX ADMIN — Medication 2 MILLIGRAM(S): at 17:23

## 2023-01-01 RX ADMIN — Medication 100 MICROGRAM(S): at 10:17

## 2023-01-01 RX ADMIN — Medication 255 MILLIGRAM(S): at 22:30

## 2023-01-01 RX ADMIN — Medication 1000 MILLIGRAM(S): at 09:30

## 2023-01-01 RX ADMIN — LACTULOSE 20 GRAM(S): 10 SOLUTION ORAL at 23:21

## 2023-01-01 RX ADMIN — HEPARIN SODIUM 5000 UNIT(S): 5000 INJECTION INTRAVENOUS; SUBCUTANEOUS at 21:26

## 2023-01-01 RX ADMIN — PHENYLEPHRINE HYDROCHLORIDE 10.2 MICROGRAM(S)/KG/MIN: 10 INJECTION INTRAVENOUS at 05:56

## 2023-01-01 RX ADMIN — Medication 650 MILLIGRAM(S): at 00:48

## 2023-01-01 RX ADMIN — CHLORHEXIDINE GLUCONATE 1 APPLICATION(S): 213 SOLUTION TOPICAL at 06:32

## 2023-01-01 RX ADMIN — MIDODRINE HYDROCHLORIDE 10 MILLIGRAM(S): 2.5 TABLET ORAL at 10:56

## 2023-01-01 RX ADMIN — HYDROMORPHONE HYDROCHLORIDE 2 MILLIGRAM(S): 2 INJECTION INTRAMUSCULAR; INTRAVENOUS; SUBCUTANEOUS at 23:36

## 2023-01-01 RX ADMIN — MIDODRINE HYDROCHLORIDE 10 MILLIGRAM(S): 2.5 TABLET ORAL at 10:03

## 2023-01-01 RX ADMIN — Medication 1 MILLIGRAM(S): at 23:15

## 2023-01-01 RX ADMIN — Medication 20.4 MICROGRAM(S)/KG/MIN: at 00:26

## 2023-01-01 RX ADMIN — Medication 25 MILLIGRAM(S): at 05:58

## 2023-01-01 RX ADMIN — Medication 1 MILLIGRAM(S): at 10:40

## 2023-01-01 RX ADMIN — PHENYLEPHRINE HYDROCHLORIDE 8.17 MICROGRAM(S)/KG/MIN: 10 INJECTION INTRAVENOUS at 17:33

## 2023-01-01 RX ADMIN — Medication 100 MICROGRAM(S): at 05:25

## 2023-01-01 RX ADMIN — Medication 3 MILLILITER(S): at 19:36

## 2023-01-01 RX ADMIN — DEXMEDETOMIDINE HYDROCHLORIDE IN 0.9% SODIUM CHLORIDE 16.3 MICROGRAM(S)/KG/HR: 4 INJECTION INTRAVENOUS at 20:43

## 2023-01-01 RX ADMIN — Medication 20.4 MICROGRAM(S)/KG/MIN: at 03:08

## 2023-01-01 RX ADMIN — Medication 20.4 MICROGRAM(S)/KG/MIN: at 04:31

## 2023-01-01 RX ADMIN — Medication 1 MILLIGRAM(S): at 11:04

## 2023-01-01 RX ADMIN — MIDODRINE HYDROCHLORIDE 10 MILLIGRAM(S): 2.5 TABLET ORAL at 01:47

## 2023-01-01 RX ADMIN — Medication 2 MILLIGRAM(S): at 03:26

## 2023-01-01 RX ADMIN — Medication 25 MILLIGRAM(S): at 06:36

## 2023-01-01 RX ADMIN — PIPERACILLIN AND TAZOBACTAM 25 GRAM(S): 4; .5 INJECTION, POWDER, LYOPHILIZED, FOR SOLUTION INTRAVENOUS at 21:55

## 2023-01-01 RX ADMIN — HEPARIN SODIUM 5000 UNIT(S): 5000 INJECTION INTRAVENOUS; SUBCUTANEOUS at 14:57

## 2023-01-01 RX ADMIN — Medication 25 MILLIGRAM(S): at 11:54

## 2023-01-01 RX ADMIN — SODIUM CHLORIDE 1000 MILLILITER(S): 9 INJECTION INTRAMUSCULAR; INTRAVENOUS; SUBCUTANEOUS at 20:50

## 2023-01-01 RX ADMIN — Medication 650 MILLIGRAM(S): at 02:11

## 2023-01-01 RX ADMIN — Medication 25 MILLIGRAM(S): at 20:20

## 2023-01-01 RX ADMIN — LACTULOSE 20 GRAM(S): 10 SOLUTION ORAL at 17:28

## 2023-01-01 RX ADMIN — HEPARIN SODIUM 5000 UNIT(S): 5000 INJECTION INTRAVENOUS; SUBCUTANEOUS at 21:58

## 2023-01-01 RX ADMIN — Medication 20 MILLIGRAM(S): at 18:02

## 2023-01-01 RX ADMIN — PIPERACILLIN AND TAZOBACTAM 25 GRAM(S): 4; .5 INJECTION, POWDER, LYOPHILIZED, FOR SOLUTION INTRAVENOUS at 21:27

## 2023-01-01 RX ADMIN — Medication 1 MILLIGRAM(S): at 09:54

## 2023-01-01 RX ADMIN — FAMOTIDINE 20 MILLIGRAM(S): 10 INJECTION INTRAVENOUS at 10:04

## 2023-01-01 RX ADMIN — Medication 50 MICROGRAM(S): at 22:20

## 2023-01-01 RX ADMIN — PIPERACILLIN AND TAZOBACTAM 25 GRAM(S): 4; .5 INJECTION, POWDER, LYOPHILIZED, FOR SOLUTION INTRAVENOUS at 16:40

## 2023-01-01 RX ADMIN — PIPERACILLIN AND TAZOBACTAM 25 GRAM(S): 4; .5 INJECTION, POWDER, LYOPHILIZED, FOR SOLUTION INTRAVENOUS at 06:34

## 2023-01-01 RX ADMIN — Medication 20.4 MICROGRAM(S)/KG/MIN: at 08:12

## 2023-01-01 RX ADMIN — HEPARIN SODIUM 5000 UNIT(S): 5000 INJECTION INTRAVENOUS; SUBCUTANEOUS at 13:39

## 2023-01-01 RX ADMIN — DEXMEDETOMIDINE HYDROCHLORIDE IN 0.9% SODIUM CHLORIDE 16.3 MICROGRAM(S)/KG/HR: 4 INJECTION INTRAVENOUS at 06:28

## 2023-01-01 RX ADMIN — Medication 2 GRAM(S): at 10:17

## 2023-01-01 RX ADMIN — MIDODRINE HYDROCHLORIDE 10 MILLIGRAM(S): 2.5 TABLET ORAL at 17:28

## 2023-01-01 RX ADMIN — DEXMEDETOMIDINE HYDROCHLORIDE IN 0.9% SODIUM CHLORIDE 16.3 MICROGRAM(S)/KG/HR: 4 INJECTION INTRAVENOUS at 05:44

## 2023-01-01 RX ADMIN — DEXMEDETOMIDINE HYDROCHLORIDE IN 0.9% SODIUM CHLORIDE 16.3 MICROGRAM(S)/KG/HR: 4 INJECTION INTRAVENOUS at 22:16

## 2023-01-01 RX ADMIN — DEXMEDETOMIDINE HYDROCHLORIDE IN 0.9% SODIUM CHLORIDE 16.3 MICROGRAM(S)/KG/HR: 4 INJECTION INTRAVENOUS at 23:29

## 2023-01-01 RX ADMIN — Medication 25 MILLIGRAM(S): at 23:21

## 2023-01-01 RX ADMIN — Medication 5.11 MICROGRAM(S)/KG/MIN: at 20:28

## 2023-01-01 RX ADMIN — Medication 5.11 MICROGRAM(S)/KG/MIN: at 05:57

## 2023-01-01 RX ADMIN — PANTOPRAZOLE SODIUM 40 MILLIGRAM(S): 20 TABLET, DELAYED RELEASE ORAL at 10:51

## 2023-01-01 RX ADMIN — Medication 255 MILLIGRAM(S): at 05:29

## 2023-01-01 RX ADMIN — Medication 1 MILLIGRAM(S): at 10:03

## 2023-01-01 RX ADMIN — Medication 25 MILLIGRAM(S): at 05:18

## 2023-01-01 RX ADMIN — Medication 2 MILLIGRAM(S): at 15:30

## 2023-01-01 RX ADMIN — DEXMEDETOMIDINE HYDROCHLORIDE IN 0.9% SODIUM CHLORIDE 16.3 MICROGRAM(S)/KG/HR: 4 INJECTION INTRAVENOUS at 12:23

## 2023-01-01 RX ADMIN — LACTULOSE 20 GRAM(S): 10 SOLUTION ORAL at 06:36

## 2023-01-01 RX ADMIN — Medication 2 MILLIGRAM(S): at 23:36

## 2023-01-01 RX ADMIN — MIDODRINE HYDROCHLORIDE 10 MILLIGRAM(S): 2.5 TABLET ORAL at 17:21

## 2023-01-01 RX ADMIN — Medication 100 MICROGRAM(S): at 05:46

## 2023-01-01 RX ADMIN — Medication 100 MICROGRAM(S): at 06:36

## 2023-01-01 RX ADMIN — Medication 2 MILLIGRAM(S): at 00:49

## 2023-01-01 RX ADMIN — DEXMEDETOMIDINE HYDROCHLORIDE IN 0.9% SODIUM CHLORIDE 16.3 MICROGRAM(S)/KG/HR: 4 INJECTION INTRAVENOUS at 23:17

## 2023-01-01 RX ADMIN — Medication 50 MILLIEQUIVALENT(S): at 04:30

## 2023-01-01 RX ADMIN — Medication 25 MILLIGRAM(S): at 23:48

## 2023-01-01 RX ADMIN — Medication 20.4 MICROGRAM(S)/KG/MIN: at 18:49

## 2023-01-01 RX ADMIN — Medication 20.4 MICROGRAM(S)/KG/MIN: at 18:58

## 2023-01-01 RX ADMIN — Medication 400 MILLIGRAM(S): at 17:38

## 2023-01-01 RX ADMIN — DEXMEDETOMIDINE HYDROCHLORIDE IN 0.9% SODIUM CHLORIDE 16.3 MICROGRAM(S)/KG/HR: 4 INJECTION INTRAVENOUS at 08:12

## 2023-01-01 RX ADMIN — DEXMEDETOMIDINE HYDROCHLORIDE IN 0.9% SODIUM CHLORIDE 16.3 MICROGRAM(S)/KG/HR: 4 INJECTION INTRAVENOUS at 19:41

## 2023-01-01 RX ADMIN — Medication 100 MILLIGRAM(S): at 23:15

## 2023-01-01 RX ADMIN — PANTOPRAZOLE SODIUM 40 MILLIGRAM(S): 20 TABLET, DELAYED RELEASE ORAL at 10:40

## 2023-01-01 RX ADMIN — Medication 5.11 MICROGRAM(S)/KG/MIN: at 03:12

## 2023-01-01 RX ADMIN — LACTULOSE 20 GRAM(S): 10 SOLUTION ORAL at 17:40

## 2023-01-01 RX ADMIN — HEPARIN SODIUM 5000 UNIT(S): 5000 INJECTION INTRAVENOUS; SUBCUTANEOUS at 05:19

## 2023-01-01 RX ADMIN — PIPERACILLIN AND TAZOBACTAM 25 GRAM(S): 4; .5 INJECTION, POWDER, LYOPHILIZED, FOR SOLUTION INTRAVENOUS at 15:10

## 2023-01-01 RX ADMIN — CHLORHEXIDINE GLUCONATE 1 APPLICATION(S): 213 SOLUTION TOPICAL at 05:29

## 2023-01-01 RX ADMIN — Medication 40 MILLIEQUIVALENT(S): at 23:48

## 2023-01-01 RX ADMIN — Medication 50 MILLIGRAM(S): at 11:59

## 2023-01-01 RX ADMIN — Medication 200 MILLIGRAM(S): at 10:51

## 2023-01-01 RX ADMIN — HEPARIN SODIUM 5000 UNIT(S): 5000 INJECTION INTRAVENOUS; SUBCUTANEOUS at 21:19

## 2023-01-01 RX ADMIN — DEXMEDETOMIDINE HYDROCHLORIDE IN 0.9% SODIUM CHLORIDE 16.3 MICROGRAM(S)/KG/HR: 4 INJECTION INTRAVENOUS at 12:45

## 2023-01-01 RX ADMIN — ONDANSETRON 4 MILLIGRAM(S): 8 TABLET, FILM COATED ORAL at 12:32

## 2023-01-01 RX ADMIN — Medication 50 MILLIEQUIVALENT(S): at 04:31

## 2023-01-01 RX ADMIN — DEXMEDETOMIDINE HYDROCHLORIDE IN 0.9% SODIUM CHLORIDE 16.3 MICROGRAM(S)/KG/HR: 4 INJECTION INTRAVENOUS at 18:49

## 2023-01-01 RX ADMIN — LACTULOSE 20 GRAM(S): 10 SOLUTION ORAL at 05:18

## 2023-01-01 RX ADMIN — HEPARIN SODIUM 5000 UNIT(S): 5000 INJECTION INTRAVENOUS; SUBCUTANEOUS at 13:44

## 2023-01-01 RX ADMIN — Medication 25 MILLIGRAM(S): at 05:45

## 2023-01-01 RX ADMIN — Medication 200 MILLIGRAM(S): at 11:04

## 2023-01-01 RX ADMIN — Medication 650 MILLIGRAM(S): at 01:18

## 2023-01-01 RX ADMIN — CHLORHEXIDINE GLUCONATE 15 MILLILITER(S): 213 SOLUTION TOPICAL at 21:19

## 2023-01-01 RX ADMIN — Medication 25 MILLIGRAM(S): at 12:15

## 2023-01-01 RX ADMIN — HEPARIN SODIUM 5000 UNIT(S): 5000 INJECTION INTRAVENOUS; SUBCUTANEOUS at 14:30

## 2023-01-01 RX ADMIN — HEPARIN SODIUM 5000 UNIT(S): 5000 INJECTION INTRAVENOUS; SUBCUTANEOUS at 13:45

## 2023-01-01 RX ADMIN — Medication 200 MILLIGRAM(S): at 10:02

## 2023-01-01 RX ADMIN — MIDAZOLAM HYDROCHLORIDE 2 MILLIGRAM(S): 1 INJECTION, SOLUTION INTRAMUSCULAR; INTRAVENOUS at 18:13

## 2023-01-01 RX ADMIN — SODIUM POLYSTYRENE SULFONATE 30 GRAM(S): 4.1 POWDER, FOR SUSPENSION ORAL at 10:17

## 2023-01-01 RX ADMIN — Medication 50 MILLIGRAM(S): at 04:27

## 2023-01-01 RX ADMIN — PIPERACILLIN AND TAZOBACTAM 25 GRAM(S): 4; .5 INJECTION, POWDER, LYOPHILIZED, FOR SOLUTION INTRAVENOUS at 05:14

## 2023-01-01 RX ADMIN — DEXMEDETOMIDINE HYDROCHLORIDE IN 0.9% SODIUM CHLORIDE 19.1 MICROGRAM(S)/KG/HR: 4 INJECTION INTRAVENOUS at 16:48
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
## 2023-01-18 NOTE — ED PROVIDER NOTE - NSICDXPASTMEDICALHX_GEN_ALL_CORE_FT
PAST MEDICAL HISTORY:  Asthma     Hyperlipidemia, unspecified hyperlipidemia type     Hypertension, unspecified type     Intranasal mass     Thyroid nodule left thyroidectomy

## 2023-01-18 NOTE — ED PROVIDER NOTE - NS ED ROS FT
Constitutional: No fever or chills  Eyes: No visual changes  HEENT: No throat pain  CV: No chest pain  Resp: No SOB no cough,+difficulty breathing  GI: No abd pain, nausea or vomiting  : No dysuria  MSK: No musculoskeletal pain  Skin: No rash  Neuro: No headache , +slurred speech, +hallucinations

## 2023-01-18 NOTE — ED ADULT TRIAGE NOTE - CHIEF COMPLAINT QUOTE
Pt BIBEMS from home with AMS. As per EMS pt girl friend concerned about pt reporting that he has been acting strange at home and that he has been saying strange things to their 11 year old son. Pt reports that he is depressed and that he owes money to the IRS. As per EMS pt is on xanax, ambien and melatonin. Pt is awake and alert in triage.

## 2023-01-18 NOTE — ED PROVIDER NOTE - OBJECTIVE STATEMENT
67 year old male with PMHx of HTN, Thyroid nodule, HLD BIBEMS from home with AMS. Pt's ex wife concerned about pt reporting that he has been acting strange at home and that he has been saying strange things to their 11 year old son. Pt reports that he is depressed and that he owes money to the IRS. As per EMS pt is on xanax, ambien and melatonin. Pt denies fever but endorses difficulty breathing, seeing things that other people do not. Pt has not taken xanax in 3 or 4 days. Pt drinks EtOH but denies withdrawal symptoms, non-smoker. Denies SI. Ex wife states speech was slurred.

## 2023-01-18 NOTE — ED PROVIDER NOTE - CARE PLAN
Principal Discharge DX:	AMS (altered mental status)  Secondary Diagnosis:	AMARJIT (acute kidney injury)   1 Principal Discharge DX:	AMS (altered mental status)  Secondary Diagnosis:	AMARJIT (acute kidney injury)  Secondary Diagnosis:	Alcohol dependence with withdrawal

## 2023-01-18 NOTE — ED PROVIDER NOTE - CLINICAL SUMMARY MEDICAL DECISION MAKING FREE TEXT BOX
Pt under a lot of stress, only PMHx is HTN and hypothyroid, with bizzare behavior since yesterday. Suspect cause is tox related either ambien or xanax, will check CT head, labs, tox screen, observe and reeval.

## 2023-01-18 NOTE — ED ADULT NURSE NOTE - NSIMPLEMENTINTERV_GEN_ALL_ED
Implemented All Fall Risk Interventions:  [facility] to call system. Call bell, personal items and telephone within reach. Instruct patient to call for assistance. Room bathroom lighting operational. Non-slip footwear when patient is off stretcher. Physically safe environment: no spills, clutter or unnecessary equipment. Stretcher in lowest position, wheels locked, appropriate side rails in place. Provide visual cue, wrist band, yellow gown, etc. Monitor gait and stability. Monitor for mental status changes and reorient to person, place, and time. Review medications for side effects contributing to fall risk. Reinforce activity limits and safety measures with patient and family.

## 2023-01-18 NOTE — ED PROVIDER NOTE - PHYSICAL EXAMINATION
Constitutional: NAD AOx3  Eyes: PERRL EOMI  Head: Normocephalic atraumatic  Mouth: MMM  Cardiac: regular rate and rhythm  Resp: Lungs CTAB  GI: Abd s/nd/nt  Neuro: CN2-12 grossly intact, FOX x 4, mild slurred speech  Skin: No visible rashes

## 2023-01-18 NOTE — ED ADULT NURSE NOTE - OBJECTIVE STATEMENT
Pt presents to the ED from home w/ wife at bedside. Pt reports that for the past two days the pt has been hallucinating and seeing figures. Pt denies any voices, tremors, SI/HI, fever/chills or sick contacts.Pt reports PMHx of hypothyroidism and insomia. Pt reports taking ambien to aid w/ sleeping. Pt reports drinking 3-4 times a week w/ 3-4 drinks each time. Pt presents to the ED from home w/ wife at bedside. Pt reports that for the past two days the pt has been hallucinating and seeing figures. Pt wife at bedside reports pt stated, "there is a little girl in a red jacket behind you," to their 11 year old son.  Pt denies any voices, tremors, SI/HI, fever/chills or sick contacts.Pt reports PMHx of hypothyroidism and insomia. Pt reports taking ambien to aid w/ sleeping. Pt reports drinking 3-4 times a week w/ 3-4 drinks each time.

## 2023-01-18 NOTE — ED ADULT NURSE NOTE - CAS TRG GEN SKIN COLOR
Normal for race
54 y/o single, unemployed,  male domiciled at [facility] with a PPH of multiple psychiatric hospitalizations and schizoaffective disorder BIB self for depressed mood and SI stating “I don’t feel like living.” Patient with Command Auditory Hallucinations and questionable Visual Hallucinations. He appears depressed and endorses suicidal ideation with plan. Probable schizoaffective disorder vs. MDD in conjunction with AH and questionable VH. He requires inpatient hospitalization at this time for the safety of the patient, stabilization of symptoms and for safe discharge planning.

## 2023-01-18 NOTE — ED PROVIDER NOTE - NSICDXPASTSURGICALHX_GEN_ALL_CORE_FT
PAST SURGICAL HISTORY:  H/O partial thyroidectomy 2008    S/P T&A (status post tonsillectomy and adenoidectomy) 1965

## 2023-01-19 PROBLEM — E78.5 HYPERLIPIDEMIA, UNSPECIFIED: Chronic | Status: ACTIVE | Noted: 2019-02-12

## 2023-01-19 PROBLEM — I10 ESSENTIAL (PRIMARY) HYPERTENSION: Chronic | Status: ACTIVE | Noted: 2019-02-12

## 2023-01-19 PROBLEM — R22.0 LOCALIZED SWELLING, MASS AND LUMP, HEAD: Chronic | Status: ACTIVE | Noted: 2019-02-12

## 2023-01-19 PROBLEM — J45.909 UNSPECIFIED ASTHMA, UNCOMPLICATED: Chronic | Status: ACTIVE | Noted: 2019-02-12

## 2023-01-19 PROBLEM — E04.1 NONTOXIC SINGLE THYROID NODULE: Chronic | Status: ACTIVE | Noted: 2019-02-12

## 2023-01-19 NOTE — CONSULT NOTE ADULT - ASSESSMENT
68 yo man with HTN and known ETOH abuse admitted with AMS.  Evidence of cocaine use though pt denies.  --AMARJIT with unclear baseline function: in the setting of recent Cocaine use and ARB.    Continue to hold ARB for now.  --Continue to monitor renal function and LFTS.    --check repeat ammonia level.  --AMS : due to illicit drugs and ETOH.  --LE edema : check urine protein and Echo, franklin with ETOH hx. 66 yo man with HTN and known ETOH abuse admitted with AMS.  Evidence of cocaine use though pt denies.  --AMARJIT with unclear baseline function: in the setting of recent Cocaine use and ARB.    Continue to hold ARB for now.    D/c further IVF for now.  --Continue to monitor renal function and LFTS.  Continue to trend CPK.  --check repeat ammonia level.  --AMS : due to illicit drugs and ETOH.  --LE edema : check urine protein and Echo, franklin with ETOH hx.

## 2023-01-19 NOTE — H&P ADULT - NSHPPHYSICALEXAM_GEN_ALL_CORE
PHYSICAL EXAM:   · Physical Examination: Constitutional: NAD. On room air.   	Eyes: PERRL EOMI  	Head: Normocephalic atraumatic  	Mouth: Clear. No exudates.   	Cardiac: S1 S2 regular rate and rhythm  	Resp: Lungs CTA bilaterally  	GI: Abd: soft, distended non tender. Bowel sound positive. No masses grossly.   	Neuro: AOx 2 1/2 ( Knows person, place and year but not sure about day and month) CN2-12 grossly intact, FOX x 4,  No asterixis. No pronator drift. Grossly no motor weakness.   Extremities: No significant edema, clubbing or cyanosis.  Skin: No visible rashes  Psychiatry: Pleasant and cooperative.

## 2023-01-19 NOTE — CONSULT NOTE ADULT - SUBJECTIVE AND OBJECTIVE BOX
HPI:  67 M with hx of HTN. thyroid goiter s/p thyroidectomy and alcohol abuse who presented to  with AMS. Patient is not the best historian and information gathered from charting. Per ex-wife, he had been acting wanda at home, slurring speech and having difficulty breathing. He has a history of alcohol use generally vodka and states his last drink was 4 days ago. He has never had withdrawal symptoms. He denied illicit drug use outside of Xanax and ambien.     Currently, AAO x 3 but with slowed mentation. Hemodynamically stable and afebrile. Labs notable for normal CBC, INR 2.54,  > 806,  > 813, Tbili 1.4, alkP 115, , Cr 2.6. Utox positive for cocaine and benzodiazepines.      CT A/P w/o contrast small volume abdominopelvic ascities, umbilical hernia, and no evidence of hepatosplenomegaly.      PAST MEDICAL & SURGICAL HISTORY:  Intranasal mass      Hypertension, unspecified type      Hyperlipidemia, unspecified hyperlipidemia type      Thyroid nodule  left thyroidectomy      Asthma      H/O partial thyroidectomy  2008      S/P T&amp;A (status post tonsillectomy and adenoidectomy)  1965          Home Medications:  ALPRAZolam 1 mg oral tablet: 1 tab(s) orally every 8 hours, As Needed - for anxiety (19 Jan 2023 09:53)  losartan 100 mg oral tablet: 1 tab(s) orally once a day (in the morning) (19 Jan 2023 09:53)  Multiple Vitamins oral tablet: 1 tab(s) orally once a day. last dose 2/ 12/ 2019 (19 Jan 2023 09:53)  rosuvastatin 5 mg oral tablet: 1 tab(s) orally once a day (in the morning) (19 Jan 2023 09:53)  sildenafil 100 mg oral tablet: 1 tab(s) orally once a day, As Needed (19 Jan 2023 09:53)  Synthroid 100 mcg (0.1 mg) oral tablet: 1 tab(s) orally once a day (in the morning) (19 Jan 2023 09:53)  Ventolin HFA 90 mcg/inh inhalation aerosol: 2 puff(s) inhaled 4 times a day, As Needed (19 Jan 2023 09:53)  zolpidem 10 mg oral tablet: 1 tab(s) orally once a day (at bedtime), As Needed (19 Jan 2023 09:53)      MEDICATIONS  (STANDING):  folic acid 1 milliGRAM(s) Oral daily  heparin   Injectable 5000 Unit(s) SubCutaneous every 8 hours  levothyroxine 100 MICROGram(s) Oral daily  LORazepam     Tablet 0.5 milliGRAM(s) Oral two times a day  multivitamin 1 Tablet(s) Oral daily  omega-3-Acid Ethyl Esters 2 Gram(s) Oral two times a day  phytonadione   Solution 5 milliGRAM(s) Oral daily    MEDICATIONS  (PRN):  acetaminophen     Tablet .. 650 milliGRAM(s) Oral every 6 hours PRN Temp greater or equal to 38C (100.4F), Mild Pain (1 - 3)  albuterol    90 MICROgram(s) HFA Inhaler 2 Puff(s) Inhalation every 6 hours PRN Shortness of Breath  aluminum hydroxide/magnesium hydroxide/simethicone Suspension 30 milliLiter(s) Oral every 4 hours PRN Dyspepsia  hydrALAZINE Injectable 10 milliGRAM(s) IV Push three times a day PRN SBP > 150  LORazepam     Tablet 1 milliGRAM(s) Oral every 4 hours PRN CIWA-Ar score increase by 2 points and a total score of 7 or less  LORazepam     Tablet 1 milliGRAM(s) Oral every 4 hours PRN CIWA-Ar score 8 or greater  melatonin 3 milliGRAM(s) Oral at bedtime PRN Insomnia  ondansetron Injectable 4 milliGRAM(s) IV Push every 8 hours PRN Nausea and/or Vomiting      Allergies    No Known Allergies    Intolerances        SOCIAL HISTORY:    FAMILY HISTORY:      ROS  As above  Otherwise unremarkable    Vital Signs Last 24 Hrs  T(C): 36.8 (19 Jan 2023 04:52), Max: 37.2 (19 Jan 2023 01:39)  T(F): 98.3 (19 Jan 2023 04:52), Max: 98.9 (19 Jan 2023 01:39)  HR: 105 (19 Jan 2023 14:00) (98 - 106)  BP: 126/88 (19 Jan 2023 14:00) (107/90 - 131/88)  BP(mean): 99 (19 Jan 2023 14:00) (90 - 102)  RR: 47 (19 Jan 2023 14:00) (17 - 47)  SpO2: 93% (19 Jan 2023 14:00) (93% - 100%)    Parameters below as of 19 Jan 2023 14:00  Patient On (Oxygen Delivery Method): nasal cannula  O2 Flow (L/min): 2      Constitutional: NAD  Respiratory: CTAB  Cardiovascular: S1 and S2, RRR  Gastrointestinal: BS+, soft, NT/ND, umbilical hernia, no fluid wave  Extremities: 1+ edema in BL LE  Psychiatric: Normal mood, normal affect, AAO x 3, asterixis  Skin: non-bleeding excoriations on abdomen    LABS:                        14.1   6.30  )-----------( 251      ( 18 Jan 2023 18:26 )             42.1     01-19    133<L>  |  100  |  75<H>  ----------------------------<  124<H>  5.0   |  21<L>  |  2.62<H>    Ca    8.9      19 Jan 2023 09:48  Phos  6.6     01-19  Mg     2.4     01-19    TPro  7.3  /  Alb  3.2<L>  /  TBili  1.4<H>  /  DBili  0.7<H>  /  AST  806<H>  /  ALT  813<H>  /  AlkPhos  115  01-19    PT/INR - ( 19 Jan 2023 09:48 )   PT: 29.7 sec;   INR: 2.54 ratio           LIVER FUNCTIONS - ( 19 Jan 2023 09:48 )  Alb: 3.2 g/dL / Pro: 7.3 gm/dL / ALK PHOS: 115 U/L / ALT: 813 U/L / AST: 806 U/L / GGT: x             RADIOLOGY & ADDITIONAL STUDIES:    ACC: [id number] EXAM:  CT ABDOMEN AND PELVIS                          PROCEDURE DATE:  01/18/2023          INTERPRETATION:  CLINICAL INFORMATION: Acute kidney injury. Elevated LFTs.    COMPARISON: None.    CONTRAST/COMPLICATIONS:  IV Contrast: NONE  Oral Contrast: NONE  Complications: None reported at time of study completion    PROCEDURE:  CT of the Abdomen and Pelvis was performed.  Sagittal and coronal reformats were performed.    FINDINGS:  LOWER CHEST: Small bilateral pleural effusions and partial compressive   atelectasis of both lower lobes. Cardiomegaly.    LIVER: Within normal limits.  BILE DUCTS: Normal caliber.  GALLBLADDER: Within normal limits.  SPLEEN: Within normal limits.  PANCREAS: Within normal limits.  ADRENALS: Within normal limits.  KIDNEYS/URETERS: Within normal limits.    BLADDER: Within normal limits.  REPRODUCTIVE ORGANS: Prostate within normal limits.    BOWEL: No bowel obstruction. Appendix is normal. Colonic diverticulosis.  PERITONEUM: Small volume abdominopelvic ascites.  VESSELS: Atherosclerotic changes.  RETROPERITONEUM/LYMPH NODES: No lymphadenopathy.  ABDOMINAL WALL: Subcutaneous edema. Small fat-containing umbilical hernia   containing ascites. Small bilateral fat-containing inguinal hernias, with   a small amount of ascites on the left.  BONES: Degenerative changes.    IMPRESSION:  No acute intra-abdominal pathology.    Small volume abdominopelvic ascites.    --- End of Report ---            ELAINA MONTENEGRO MD; Attending Radiologist  This document has been electronically signed. Jan 18 2023  8:32PM

## 2023-01-19 NOTE — CONSULT NOTE ADULT - ASSESSMENT
1. Elevated liver tests in hepatocellular pattern. Pattern not consistent with alcohol hepatitis.   2. Altered mental status  3. Alcohol/substance abuse  4. AMARJIT    Recommendation  1. Check autoimmune/infectious serologies (hepatitis, anti-sm, anti-mitochondrial, ferritin, immunoglobulins)  2. Trend LFT, INR, CPK daily  3. Start vitamin K 5 mg PO x 3 days  4. Monitor for alcohol withdrawal and CIWA protocol  5. US abdomen with doppler  6. Laculose 10 mg BID and titrate to 2-3 BM daily for now.   7. Thiamine and folate supplementation  8. Follow up infectious work up

## 2023-01-19 NOTE — H&P ADULT - PROBLEM SELECTOR PLAN 5
Hold Losartan. Order Kayxelate. IVF. EKG ordered. Repeat BMP later today. If needed D50 and insulin. Renal consultation.

## 2023-01-19 NOTE — CHART NOTE - NSCHARTNOTEFT_GEN_A_CORE
Called by nurse for patient scoring a CIWA of 12, combative and refusing medications. Changed CIWA to high risk taper with symptom triggered IV Ativan Q2H. Will attempt to provide medications after Ativan given.

## 2023-01-19 NOTE — H&P ADULT - PROBLEM SELECTOR PLAN 3
CIWA 1. Last drink two days ago. Cautiously place on standing Ativan low dose with CIWA scale based. Order MVI, Folic Acid and Thiamine. SW to see.

## 2023-01-19 NOTE — H&P ADULT - PROBLEM SELECTOR PLAN 8
Suspect alcoholic hepatitis. Check hepatitis serologies. Noted CT abdomen and pelvis. Repeat  LFTs for today. Check PT INR. Consulted GI Dr. Josse Duran. MDF can not be calculated needing PT INR. Monitor.

## 2023-01-19 NOTE — H&P ADULT - HISTORY OF PRESENT ILLNESS
67 year old male with PMHx of HTN, Thyroid nodule, HLD BIBEMS from home with AMS. Pt's ex wife concerned about pt reporting that he has been acting strange at home and that he has been saying strange things to their 11 year old son. Pt reports that he is depressed and that he owes money to the IRS. As per EMS pt is on xanax, Ambien and melatonin. Pt denies fever but endorses difficulty breathing, seeing things that other people do not. Pt has not taken xanax in 3 or 4 days. Pt drinks EtOH but denies withdrawal symptoms, non-smoker. Denies SI. Ex wife states speech was slurred.          67 year old male with PMHx of HTN, Thyroid Goiter s/p thyroidectomy, HLD BIBEMS from home with AMS. Pt's ex wife concerned about pt reporting that he has been acting strange at home and that he has been saying strange things to their 11 year old son. Pt reports that he is depressed and that he owes money to the IRS. As per EMS pt is on xanax, Ambien and melatonin. Pt denies fever but endorses difficulty breathing, seeing things that other people do not. Pt has not taken xanax in 3 or 4 days. Pt drinks EtOH but denies withdrawal symptoms, non-smoker. Denies SI. Ex wife states speech was slurred. Patient was admitted with altered mental status. Patient was seen in the room and doing better. Patient knows that he is in hospital. Denies any complaints of chest pain, shortness of breath, headache, dizziness, nausea, vomiting or abdominal pain. No fall or head injury. Denies using any drugs.

## 2023-01-19 NOTE — H&P ADULT - PROBLEM SELECTOR PLAN 2
CT abdomen and pelvis negative for obstructive uropathy. Order IVF. Hold Losartan. Check CPK. Noted UA. Repeat BMP later today. Renal consult contacted.

## 2023-01-19 NOTE — H&P ADULT - SOCIAL HISTORY: ALCOHOL USE
Claims that he is only drinking three times a week now but used to drink after COVID-19 4-5 times a day.

## 2023-01-19 NOTE — CONSULT NOTE ADULT - ASSESSMENT
67M with pmhx etoh abuse, xanax use now admitted for altered mental status in setting of etoh withdrawal.     - would recommend standing ativan taper, can use PO or IV, with prn ativan as breakthrough   - can continue lactulose  - does not need ICU at this time - d/w Dr. Ribera and bedside RN, aware and in agreement as he has improved now with standing ativan taper that was recently started/increased   - please re-consult should clinical condition change     Above d/w Dr. Rosado

## 2023-01-19 NOTE — H&P ADULT - PROBLEM SELECTOR PLAN 4
Mild. Check TSH. Check Uric acid. Serum and urine osmolality. Cautious use of IV NS. Repeat BMP later today. Renal consultation.

## 2023-01-19 NOTE — H&P ADULT - PROBLEM SELECTOR PLAN 6
Normotensive. Hydralazine as needed for elevated blood pressure. Monitor. May need to add Norvasc if SBP greater than 150.

## 2023-01-19 NOTE — CONSULT NOTE ADULT - SUBJECTIVE AND OBJECTIVE BOX
Chief complaints.  brought to ed by his ex wife due to AMS    HPI: Hx obtained from ex wife.  68 yo man with PMHX of HTN,( on Losartan), hx of asthma, living alone, brought to Ed when found to be confused with hallucinations.  Per pt's wife, pt was well until a few days ago.  Yesterday noted with slurred speech and confusion and relates ETOH hx of drinking vodka/screwdriver 4-5 x/day.  On Losartan for HTN but has not seen PMD since pandemic.  No known hx of CKD.  Creat 2.76  and abnormal LFTs.  CT non contrast with no intra abdominal pathology, positive for small bilateral pleural effusion with cardiomegaly.    PMHX and PSHX.  --HTN  --Thyroid goiter --post Thyroidectomy  --Post intranasal polyp resection.  --Hx of Asthma    Home Medications:   * Patient Currently Takes Medications as of 2019 09:57 documented in Structured Notes  · 	losartan 100 mg oral tablet: 1 tab(s) orally once a day (in the morning)  · 	Synthroid 100 mcg (0.1 mg) oral tablet: 1 tab(s) orally once a day (in the morning)  · 	rosuvastatin 5 mg oral tablet: 1 tab(s) orally once a day (in the morning)  · 	Multiple Vitamins oral tablet: 1 tab(s) orally once a day. last dose 2019  · 	Ventolin HFA 90 mcg/inh inhalation aerosol: 2 puff(s) inhaled 4 times a day, As Needed  · 	Fish Oil 1000 mg oral capsule: 1 cap(s) orally 3 times a day    FAMILY HISTORY: N/C    SOCIAL HISTORY : No smoking.  Daily Vodka 4-5 x/day.  No illicit drugs.    Allergies :  No Known Allergies    REVIEW OF SYSTEMS :  Feeling slightly improved.  Now oriented x3 and able to state presidents Biden and Trump.  Denies SOB.    MEDICATIONS  (STANDING):  folic acid 1 milliGRAM(s) Oral daily  heparin   Injectable 5000 Unit(s) SubCutaneous every 8 hours  levothyroxine 100 MICROGram(s) Oral daily  LORazepam     Tablet 0.5 milliGRAM(s) Oral two times a day  LORazepam   Injectable 1 milliGRAM(s) IV Push once  multivitamin 1 Tablet(s) Oral daily  omega-3-Acid Ethyl Esters 2 Gram(s) Oral two times a day  sodium chloride 0.9%. 1000 milliLiter(s) (125 mL/Hr) IV Continuous <Continuous>    MEDICATIONS  (PRN):  acetaminophen     Tablet .. 650 milliGRAM(s) Oral every 6 hours PRN Temp greater or equal to 38C (100.4F), Mild Pain (1 - 3)  albuterol    90 MICROgram(s) HFA Inhaler 2 Puff(s) Inhalation every 6 hours PRN Shortness of Breath  aluminum hydroxide/magnesium hydroxide/simethicone Suspension 30 milliLiter(s) Oral every 4 hours PRN Dyspepsia  hydrALAZINE Injectable 10 milliGRAM(s) IV Push three times a day PRN SBP > 150  LORazepam     Tablet 1 milliGRAM(s) Oral every 4 hours PRN CIWA-Ar score increase by 2 points and a total score of 7 or less  LORazepam     Tablet 1 milliGRAM(s) Oral every 3 hours PRN CIWA-Ar score 8 or greater  LORazepam   Injectable 2 milliGRAM(s) IV Push every 4 hours PRN Symptom-triggered: 2 point increase in CIWA -Ar score and a total score of 7 or LESS  melatonin 3 milliGRAM(s) Oral at bedtime PRN Insomnia  ondansetron Injectable 4 milliGRAM(s) IV Push every 8 hours PRN Nausea and/or Vomiting      Vital Signs Last 24 Hrs  T(C): 36.8 (2023 04:52), Max: 37.2 (2023 01:39)  T(F): 98.3 (2023 04:52), Max: 98.9 (2023 01:39)  HR: 102 (2023 11:00) (98 - 106)  BP: 125/89 (2023 11:00) (107/90 - 131/88)  BP(mean): 101 (2023 11:00) (90 - 102)  RR: 37 (2023 11:00) (17 - 37)  SpO2: 98% (2023 11:00) (98% - 100%)    Parameters below as of 2023 08:00  Patient On (Oxygen Delivery Method): room air    Daily Weight in k (2023 06:09)  I&O's Summary      PHYSICAL EXAM:  GEN: Alert Oriented, denies SOB but appears dyspneic  HEENT: WNL  NECK : supple  CV: S1S2 RRR  LUNGS: clear to aus  ABD: distended  EXT: 1+ edema    LABS:                        14.1   6.30  )-----------( 251      ( 2023 18:26 )             42.1         133<L>  |  100  |  75<H>  ----------------------------<  124<H>  5.0   |  21<L>  |  2.62<H>    Ca    8.9      2023 09:48  Phos  6.6       Mg     2.4         TPro  7.3  /  Alb  3.2<L>  /  TBili  1.4<H>  /  DBili  0.7<H>  /  AST  806<H>  /  ALT  813<H>  /  AlkPhos  115      PT/INR - ( 2023 09:48 )   PT: 29.7 sec;   INR: 2.54 ratio           Urinalysis Basic - ( 2023 21:28 )    Color: Yellow / Appearance: Clear / S.020 / pH: x  Gluc: x / Ketone: Negative  / Bili: Negative / Urobili: 1   Blood: x / Protein: 100 / Nitrite: Negative   Leuk Esterase: Negative / RBC: Negative /HPF / WBC 3-5 /HPF   Sq Epi: x / Non Sq Epi: Occasional / Bacteria: Moderate      Magnesium, Serum: 2.4 mg/dL ( @ 09:48)  Phosphorus Level, Serum: 6.6 mg/dL ( @ 09:48)  Magnesium, Serum: 2.4 mg/dL ( @ 06:18)  Phosphorus Level, Serum: 6.5 mg/dL ( @ 06:18)  Magnesium, Serum: 2.4 mg/dL ( @ 23:14)  Phosphorus Level, Serum: 6.2 mg/dL ( @ 23:14)       Chief complaints.  brought to ed by his ex wife due to AMS    HPI: Hx obtained from ex wife.  66 yo man with PMHX of HTN,( on Losartan), hx of asthma, living alone, brought to Ed when found to be confused with hallucinations.  Per pt's wife, pt was well until a few days ago.  Yesterday noted with slurred speech and confusion and relates ETOH hx of drinking vodka/screwdriver 4-5 x/day.  On Losartan for HTN but has not seen PMD since pandemic.  No known hx of CKD.  Creat 2.76  and abnormal LFTs.  Urine positive for Cocaine and Benzo, though pt adamantly  denies  illicit drug use  CT non contrast with no intra abdominal pathology, positive for small bilateral pleural effusion with cardiomegaly.    PMHX and PSHX.  --HTN  --Thyroid goiter --post Thyroidectomy  --Post intranasal polyp resection.  --Hx of Asthma    Home Medications:   * Patient Currently Takes Medications as of 2019 09:57 documented in Structured Notes  · 	losartan 100 mg oral tablet: 1 tab(s) orally once a day (in the morning)  · 	Synthroid 100 mcg (0.1 mg) oral tablet: 1 tab(s) orally once a day (in the morning)  · 	rosuvastatin 5 mg oral tablet: 1 tab(s) orally once a day (in the morning)  · 	Multiple Vitamins oral tablet: 1 tab(s) orally once a day. last dose 2019  · 	Ventolin HFA 90 mcg/inh inhalation aerosol: 2 puff(s) inhaled 4 times a day, As Needed  · 	Fish Oil 1000 mg oral capsule: 1 cap(s) orally 3 times a day    FAMILY HISTORY: N/C    SOCIAL HISTORY : No smoking.  Daily Vodka 4-5 x/day.  No illicit drugs.    Allergies :  No Known Allergies    REVIEW OF SYSTEMS :  Feeling slightly improved.  Now oriented x3 and able to state presidents Biden and Trump.  Denies SOB.    MEDICATIONS  (STANDING):  folic acid 1 milliGRAM(s) Oral daily  heparin   Injectable 5000 Unit(s) SubCutaneous every 8 hours  levothyroxine 100 MICROGram(s) Oral daily  LORazepam     Tablet 0.5 milliGRAM(s) Oral two times a day  LORazepam   Injectable 1 milliGRAM(s) IV Push once  multivitamin 1 Tablet(s) Oral daily  omega-3-Acid Ethyl Esters 2 Gram(s) Oral two times a day  sodium chloride 0.9%. 1000 milliLiter(s) (125 mL/Hr) IV Continuous <Continuous>    MEDICATIONS  (PRN):  acetaminophen     Tablet .. 650 milliGRAM(s) Oral every 6 hours PRN Temp greater or equal to 38C (100.4F), Mild Pain (1 - 3)  albuterol    90 MICROgram(s) HFA Inhaler 2 Puff(s) Inhalation every 6 hours PRN Shortness of Breath  aluminum hydroxide/magnesium hydroxide/simethicone Suspension 30 milliLiter(s) Oral every 4 hours PRN Dyspepsia  hydrALAZINE Injectable 10 milliGRAM(s) IV Push three times a day PRN SBP > 150  LORazepam     Tablet 1 milliGRAM(s) Oral every 4 hours PRN CIWA-Ar score increase by 2 points and a total score of 7 or less  LORazepam     Tablet 1 milliGRAM(s) Oral every 3 hours PRN CIWA-Ar score 8 or greater  LORazepam   Injectable 2 milliGRAM(s) IV Push every 4 hours PRN Symptom-triggered: 2 point increase in CIWA -Ar score and a total score of 7 or LESS  melatonin 3 milliGRAM(s) Oral at bedtime PRN Insomnia  ondansetron Injectable 4 milliGRAM(s) IV Push every 8 hours PRN Nausea and/or Vomiting      Vital Signs Last 24 Hrs  T(C): 36.8 (2023 04:52), Max: 37.2 (2023 01:39)  T(F): 98.3 (2023 04:52), Max: 98.9 (2023 01:39)  HR: 102 (2023 11:00) (98 - 106)  BP: 125/89 (2023 11:00) (107/90 - 131/88)  BP(mean): 101 (2023 11:00) (90 - 102)  RR: 37 (2023 11:00) (17 - 37)  SpO2: 98% (2023 11:00) (98% - 100%)    Parameters below as of 2023 08:00  Patient On (Oxygen Delivery Method): room air    Daily Weight in k (2023 06:09)  I&O's Summary      PHYSICAL EXAM:  GEN: Alert Oriented, denies SOB but appears dyspneic  HEENT: WNL  NECK : supple  CV: S1S2 RRR  LUNGS: clear to aus  ABD: distended  EXT: 1+ edema    LABS:                        14.1   6.30  )-----------( 251      ( 2023 18:26 )             42.1         133<L>  |  100  |  75<H>  ----------------------------<  124<H>  5.0   |  21<L>  |  2.62<H>    Ca    8.9      2023 09:48  Phos  6.6       Mg     2.4         TPro  7.3  /  Alb  3.2<L>  /  TBili  1.4<H>  /  DBili  0.7<H>  /  AST  806<H>  /  ALT  813<H>  /  AlkPhos  115      PT/INR - ( 2023 09:48 )   PT: 29.7 sec;   INR: 2.54 ratio           Urinalysis Basic - ( 2023 21:28 )    Color: Yellow / Appearance: Clear / S.020 / pH: x  Gluc: x / Ketone: Negative  / Bili: Negative / Urobili: 1   Blood: x / Protein: 100 / Nitrite: Negative   Leuk Esterase: Negative / RBC: Negative /HPF / WBC 3-5 /HPF   Sq Epi: x / Non Sq Epi: Occasional / Bacteria: Moderate      Magnesium, Serum: 2.4 mg/dL ( @ 09:48)  Phosphorus Level, Serum: 6.6 mg/dL ( @ 09:48)  Magnesium, Serum: 2.4 mg/dL ( @ 06:18)  Phosphorus Level, Serum: 6.5 mg/dL ( @ 06:18)  Magnesium, Serum: 2.4 mg/dL ( @ 23:14)  Phosphorus Level, Serum: 6.2 mg/dL ( @ 23:14)

## 2023-01-19 NOTE — CONSULT NOTE ADULT - SUBJECTIVE AND OBJECTIVE BOX
CC: AMS     HPI: 67M with pmhx HTN, goiter s/p thyroidectomy, hld was brought to ED fo AMS. CT head negative. Noted by EMS to use xanax and ambien at home. Last drink reproted to be two days ago. Unclear how much he drinhks daily. Also per chart hs not used xanax in 3-4 days. CTh negative for acute changes. He was admitted for AMS likely from etoh w/d. This afternoon ICU consulted for CIWA score of 16.     Patient seen and examined at bedside. Agitated when awoken but otherwise resting in bed. Offers no acute complaints. Has gotten a total of ativan 1mg ivp x 1 and ativan 5mg po total. Per RN, pt had been hallucinating earlier.     ROS:   CONSTITUTIONAL: (-) fever, (-) chills  RESPIRATORY: (-) SOB, (-) cough  CV: (-) chest pain, (-) palpitations  GI: (-) abdominal pain, (-) nausea, (-) vomiting, (-) diarrhea, (-) constipation   NEURO: (-) headache, (-) weakness, (-) visual changes    PAST MEDICAL & SURGICAL HISTORY:  Intranasal mass  Hypertension, unspecified type  Hyperlipidemia, unspecified hyperlipidemia type  Thyroid nodule  left thyroidectomy  Asthma  H/O partial thyroidectomy    S/P T&amp;A (status post tonsillectomy and adenoidectomy)  1965    Medications:  hydrALAZINE Injectable 10 milliGRAM(s) IV Push three times a day PRN  albuterol    90 MICROgram(s) HFA Inhaler 2 Puff(s) Inhalation every 6 hours PRN  acetaminophen     Tablet .. 650 milliGRAM(s) Oral every 6 hours PRN  LORazepam     Tablet 1 milliGRAM(s) Oral every 2 hours PRN  LORazepam     Tablet 1 milliGRAM(s) Oral every 1 hour PRN  LORazepam     Tablet   Oral   LORazepam     Tablet 2 milliGRAM(s) Oral every 4 hours  LORazepam   Injectable 2 milliGRAM(s) IV Push every 2 hours PRN  melatonin 3 milliGRAM(s) Oral at bedtime PRN  ondansetron Injectable 4 milliGRAM(s) IV Push every 8 hours PRN  heparin   Injectable 5000 Unit(s) SubCutaneous every 8 hours    aluminum hydroxide/magnesium hydroxide/simethicone Suspension 30 milliLiter(s) Oral every 4 hours PRN  lactulose Syrup 10 Gram(s) Oral two times a day      levothyroxine 100 MICROGram(s) Oral daily    folic acid 1 milliGRAM(s) Oral daily  multivitamin 1 Tablet(s) Oral daily  phytonadione   Solution 5 milliGRAM(s) Oral daily        omega-3-Acid Ethyl Esters 2 Gram(s) Oral two times a day    ICU Vital Signs Last 24 Hrs  T(C): 36.3 (2023 17:54), Max: 37.2 (2023 01:39)  T(F): 97.4 (2023 17:54), Max: 98.9 (2023 01:39)  HR: 104 (2023 17:00) (98 - 106)  BP: 114/81 (2023 17:00) (107/90 - 131/88)  BP(mean): 91 (2023 17:00) (87 - 102)  ABP: --  ABP(mean): --  RR: 46 (2023 17:00) (17 - 47)  SpO2: 95% (2023 17:00) (93% - 100%)    O2 Parameters below as of 2023 17:00  Patient On (Oxygen Delivery Method): nasal cannula  O2 Flow (L/min): 2    Physical Examination:    General: Laying in bed comfortably in no acute distress  HEENT: Pupils equal, reactive to light.  Symmetric.  PULM: Clear to auscultation bilaterally  CVS: Regular rate, fast   ABD: Soft, mild distension, nontender, normoactive bowel sounds  EXT: No edema, nontender  SKIN: Warm and well perfused  NEURO: AOx1 (self), somnelent, responsive to verbal stimuli     I&O's Detail      LABS:                        14.1   6.30  )-----------( 251      ( 2023 18:26 )             42.1         133<L>  |  100  |  75<H>  ----------------------------<  124<H>  5.0   |  21<L>  |  2.62<H>    Ca    8.9      2023 09:48  Phos  6.6       Mg     2.4         TPro  7.3  /  Alb  3.2<L>  /  TBili  1.4<H>  /  DBili  0.7<H>  /  AST  806<H>  /  ALT  813<H>  /  AlkPhos  115        CARDIAC MARKERS ( 2023 09:48 )  x     / x     / 857 U/L / x     / x          CAPILLARY BLOOD GLUCOSE        PT/INR - ( 2023 09:48 )   PT: 29.7 sec;   INR: 2.54 ratio           Urinalysis Basic - ( 2023 21:28 )    Color: Yellow / Appearance: Clear / S.020 / pH: x  Gluc: x / Ketone: Negative  / Bili: Negative / Urobili: 1   Blood: x / Protein: 100 / Nitrite: Negative   Leuk Esterase: Negative / RBC: Negative /HPF / WBC 3-5 /HPF   Sq Epi: x / Non Sq Epi: Occasional / Bacteria: Moderate      RADIOLOGY:   < from: CT Head No Cont (23 @ 19:00) >    INTERPRETATION:  HISTORY: Altered mental status.    COMPARISON: None.    TECHNIQUE: Axial noncontrast CT images from the skull base to the vertex   were obtained and submitted for interpretation. Coronal and sagittal   reformatted images were performed. Bone and soft tissue windows were   evaluated.    FINDINGS:    Patient motion degrades image quality.    There is no acute intracranial mass-effect, hemorrhage, midline shift, or   abnormal extra-axial fluid collection. Gray-white differentiation is   maintained.    Mild chronic microvascular ischemic changes are noted.    Ventricles, sulci, and cisterns are normal in size for the patient's age   without hydrocephalus. Basal cisterns are patent.    Bilateral maxillary sinus polyps and retention cysts and scattered   ethmoid sinus mucosal thickening bilaterally. S-shaped nasal septal   deviation with bony spurring. Scattered opacification and lucency in the   anterior nasal cavity bilaterally. Mastoid air cells are clear. Calvarium   is intact.    IMPRESSION: Motion degraded study.    No acute intracranial bleeding.    --- End of Report ---    < end of copied text >

## 2023-01-19 NOTE — H&P ADULT - NSHPREVIEWOFSYSTEMS_GEN_ALL_CORE
Constitutional: No fever or chills  	Eyes: No visual changes  	HEENT: No throat pain  	CV: No chest pain  	Resp: No SOB no cough,+ difficulty breathing  	GI: No abd pain, nausea or vomiting  	: No dysuria  	MSK: No musculoskeletal pain  	Skin: No rash  Neuro: No headache , +slurred speech, +hallucinations per ex-wife but patient does not remember.

## 2023-01-19 NOTE — H&P ADULT - ASSESSMENT
Patient:  Luna Gitelman  :  1939  PCP:  Lauren Flwoer DO  Date of visit: 2017    Chief Complaint:   Chief Complaint   Patient presents with   â¢ Endocrine Problem     Follow up hyperthyroidism   â¢ Endocrine Problem     Follow up multinodualr goiter   â¢ Endocrine Problem     Follow up osteoporosis   â¢ Endocrine Problem     Follow up severe vitamin D deficency      HISTORY OF PRESENT ILLNESS:  Luna Gitelman is a 78year old female who is here today for evaluation of hyperthyroidism at the request of her primary care provider, Lauren Flower DO. Patient has history of hyperthyroidism likely secondary to Graves' disease. TSI antibodies were elevated. Patient is on methimazole 5 mg one tablet daily. Most recent labs are reviewed with the patient. Ultrasound thyroid was performed September 15, 2016. There is a large right thyroid nodule which measures3.3 x 2.7 x 2.3 cm. This is slightly smaller than previous measurement 1 year ago 2015 . There was an attempted biopsy on the same by Dr. Denzel Kimball in 2015 but it was inconclusive. Patient also has a small right thyroid nodule which measures 1.0Ã1.4Ã1. 2 cm in size. On the left lobe there are 3 nodules. 1. Spongiform heterogeneous smoothly marginated nodule measuring 7 x 7 x 6  mm. 2. Solid heterogeneous smoothly marginated 12 x 9 x 9 mm nodule. 3. Solid 5 x 3 x 3 mm heterogeneous smoothly marginated nodule. Left thyroid nodules are deemed new. Patient feels well overall. She denies any insomnia, palpitations, diaphoresis, weight loss, hyperdefecation and muscle weakness. She also denies any constipation, lethargy, myalgias, arthralgias, hair loss, depression and mental fogginess. She does state some weight gain recently but also admits to poor dietary habits. Patient also had a bone mineral density performed September 15, 2016 which shows osteoporosis. Patient has history vitamin D deficiency as well.  She is on ergocalciferol 50,000 units once a week which was started 2 months ago. Patient has never had a fall or fracture . Most recent vitamin D level is normal.    Lumbar Spine L2 :     BMD (g/cm2):  1.120     T-score:  -0.7     Z-score: 1.1    Left Femoral Neck:     BMD (g/cm2):  0.555     T-score:  -3.5     Z-score: -1.6    Right Femoral Neck:     BMD (g/cm2):  0.581     T-score:  -3.3     Z-score: -1.4     Left Total Hip:     BMD (g/cm2):  0.611     T-score:  -3.2     Z-score: -1.4        Right Total Hip:    BMD (g/cm2):  0.602     T-score:  -3.2     Z-score: -1.5         HISTORIES:  I have reviewed the patient's medications and allergies, past medical, surgical, social and family history, updating these as appropriate. See Histories section of the EMR for a display of this information. MEDICATIONS / ALLERGIES:  Current Outpatient Prescriptions   Medication Sig   â¢ methimazole (TAPAZOLE) 5 MG tablet Take 1 tab Monday thru Friday, take 1/2 tab on Saturday and Sunday   â¢ ondansetron (ZOFRAN) 4 MG tablet Take 1 tablet by mouth every 8 hours as needed for Nausea. â¢ fluticasone (FLONASE) 50 MCG/ACT nasal spray Spray 2 sprays in each nostril daily as needed (congestion, ear fullness). â¢ losartan-hydrochlorothiazide (HYZAAR) 100-12.5 MG per tablet TAKE 1 TABLET BY MOUTH DAILY   â¢ Vitamin D, Ergocalciferol, 55344 UNITS capsule 1 cap q weekly- put on hold, til pt calls for next refill per pt to pharmacy   â¢ Ca Phosphate-Cholecalciferol 250-400 MG-UNIT Chew Tab Chew 1 tablet by mouth daily. â¢ DISPENSE Adult multivitamin-gummy   â¢ alendronate (FOSAMAX) 70 MG tablet Take 1 tablet by mouth every 7 days. Take on empty stomach in the morning, take with full glass of water, stay upright for at least 60min. No current facility-administered medications for this visit.       Allergies as of 02/01/2017 [facility] as Reviewed 02/01/2017   Allergen Reaction Noted   â¢ Codeine GI UPSET and NAUSEA 01/30/2012   â¢ Zithromax [azithromycin] DIARRHEA 11/29/2012     Lab Review  TSH (mcUnits/mL)   Date Value   01/24/2017 3.088   09/27/2016 0.072 (L)     T4, FREE (ng/dL)   Date Value   01/24/2017 0.8   09/27/2016 1.3     Lab Services on 01/24/2017   Component Date Value Ref Range Status   â¢ Albumin 01/24/2017 3.5* 3.6 - 5.1 g/dL Final   â¢ CALCIUM 01/24/2017 9.1  8.4 - 10.2 mg/dL Final   â¢ INTACT PTH 01/24/2017 60  14 - 72 pg/mL Final   â¢ VITAMIND, 25 HYDROXY 01/24/2017 43.3  30.0 - 100.0 ng/mL Final   â¢ TSH 01/24/2017 3.088  0.350 - 5.000 mcUnits/mL Final   â¢ TRIIODOTHYRONINE, FREE 01/24/2017 2.4  2.2 - 4.0 pg/mL Final   â¢ T4, FREE 01/24/2017 0.8  0.8 - 1.5 ng/dL Final   â¢ AST/SGOT 01/24/2017 12  <38 Units/L Final   â¢ ALT/SGPT 01/24/2017 13  <79 Units/L Final      Results for orders placed during the hospital encounter of 09/15/16   US Thyroid Only    Narrative Exam: US THYROID ONLY    Indication: Right thyroid nodule. Comparison: 8/6/2015. Findings: The right thyroid lobe measures 5.9 x 2.8 x 2.4 cm and the left thyroid  lobe measures 4.4 x 1.5 x 1.47 m per the thyroid isthmus measures 4 mm in  thickness. Right thyroid lobe nodules:  1. Cystic superior pole lesion measuring 5 x 4 x 3 mm. 2. Spongiform midpole 10 x 14 x 12 mm heterogeneously marginated nodule  area  3. Densely calcified solid 3.3 x 2.7 x 2.3 cm heterogeneous nodule. This  lesion previously measured 3.8 x 3.2 x 2.4 cm. Left thyroid lobe nodules:  1. Spongiform heterogeneous smoothly marginated nodule measuring 7 x 7 x 6  mm. 2. Solid heterogeneous smoothly marginated 12 x 9 x 9 mm nodule. 3. Solid 5 x 3 x 3 mm heterogeneous smoothly marginated nodule. Normal color Doppler flow established in the thyroid gland. The thyroid  gland is diffusely heterogeneous. Multiple prominent lymph nodes in the left superior neck measuring up to 15  mm. Impression Impression:    1.  Stable appearance of densely calcified right inferior pole thyroid  lesion measuring 3.3 x 2.7 x 2. 3 cm.  2. Otherwise, stable thyroid nodules. Multiple new thyroid nodule seen in  the left thyroid lobe measuring up to 9 x 9 x 12 mm. Management as below. ---------------------------------------------------------------------------  -----------------------------------  SOCIETY OF [employer]IOLOGISTS IN ULTRASOUND  (SRU) CONSENSUS STATEMENT FOR  MANAGEMENT OF THYROID NODULES     Solitary Nodule       Microcalcification                                  FNA if: >= 1cm       Solid or nearly entirely solid                              >= 1.5cm       Coarse Calcifications                                        >= 1.5cm       Mixed Solid Cystic                                             >= 2cm       Mainly Cystic with mural nodule                        >= 2cm    Substantial interval growth (20% in at least two dimensions):  Consider FNA    Almost entirely cystic and none of above with no growth or no prior US:    FNA probably not necessary. Multiple nodules:  Prioritize FNA selection on basis of criteria for  solitary nodules. Enlarged gland with multiple nodules of similar appearance with no or  little intervening parenchyma:  FNA probably not warranted. Abnormal Lymph Nodes:  FNA both lymph node and ipsilateral nodules  regardless of criteria. Source:  Management of Thyroid Nodules Detected at [address] [facility], [employer] of  Radiologists in [address] [facility], 2005  ---------------------------------------------------------------------------  ---------------------------------       Results for orders placed during the hospital encounter of 09/15/16   BD Dexa Axial Skeleton    Narrative BD DEXA AXIAL SKELETON     CLINICAL HISTORY:  78 years Female. Clinical risk factors for osteoporosis  include:  Post-menopausal state. Screening for osteoporosis. Hyperthyroidism. History of postmenopausal osteoporosis. TECHNIQUE:  DXA bone densitometry was performed on a [employer].     COMPARISON: None.      LIMITATIONS OF STUDY:  Significant degenerative sclerosis of the lumbar  spine necessitating exclusion of one or more vertebral levels. PRECISION DATA:  Least Significant Change (LSC) (at 95% confidence limits)  = approximately +/- 3.0%    INTERPRETATION:                      Lumbar Spine L2 :     BMD (g/cm2):  1.120     T-score:  -0.7     Z-score:   1.1         Left Femoral Neck:     BMD (g/cm2):  0.555     T-score:  -3.5     Z-score:   -1.6    Right Femoral Neck:     BMD (g/cm2):  0.581     T-score:  -3.3     Z-score:   -1.4        Left Total Hip:     BMD (g/cm2):  0.611     T-score:  -3.2     Z-score:   -1.4        Right Total Hip:    BMD (g/cm2):  0.602     T-score:  -3.2     Z-score:   -1.5         Forearm: N/A    BMD (g/cm2):  N/A      T-score:   N/A      Z-score: N/A            WHO Criteria Statement:  NORMAL:  At or above -1.0 S.D. below peak bone density of the referenced  young adult population (T-score). LOW BONE MASS/OSTEOPENIA:  Between -1.0 and -2.5 S. D. below peak bone  density of the referenced young adult population. OSTEOPOROSIS:  At or less than -2.5 S. D. below peak bone density of the  referenced young adult population. FRAX RISK:  Not available at this facility, see [email or web address]    (Note: FRAX risk is not valid if T score is below -2.5 or if the patient  is currently on treatment). Impression  IMPRESSION:    1. According to above results and the ISCD criteria, and the lowest above  T-scores of -3.5 in the left femoral neck,  this patient is considered to  have OSTEOPOROSIS. Fracture risk is high. Treatment is advised. Followup  bone densitometry is recommended.    _____________________    Note:  Not everyone with low BMD has osteoporosis. Osteomalacia and other  metabolic disorders should also be considered where indicated.   Patients  who have osteoporosis should be evaluated for specific diseases and  conditions (secondary causes) that may cause and contribute to bone loss. People with diagnosed cases of osteoporosis or at high risk for fracture  should have regular bone mineral density tests. For patients eligible for  Medicare, routine testing is allowed once every 2 years. The testing  frequency can be increased to one year for patients who have rapidly  progressing disease, those who are receiving or discontinuing medical  therapy to restore bone mass, or have additional risk factors. Check with  regional carriers for more information. PHYSICAL EXAM  Visit Vitals   â¢ /78 (BP Location: [facility], Patient Position: Sitting, Cuff Size: Regular)   â¢ Pulse 80   â¢ Resp 14   â¢ Ht 5' (1.524 m)   â¢ Wt 69.9 kg   â¢ BMI 30.08 kg/m2   , Body mass index is 30.08 kg/(m^2). HEENT:  Pupils are equal and reactive to light and accommodation. Extraocular muscles are intact. There is no lid lag. There is no stare or proptosis. Atraumatic. NECK:  Supple. Thyroid is slightly palpable suggesting that the thyroid nodule may be growing inward. There are no neck masses palpable. There is no cervical or supraclavicular lymphadenopathy. Trachea is midline. There is no JVD (jugular venous distention). LUNGS:  Bilaterally clear to auscultation. Unlabored breathing. HEART:  Regular rate and rhythm. No S3 or murmur. Bilateral peripheral pulses are palpable. ABDOMEN:  Soft. Bowel sounds present in all quadrants. No hepatosplenomegaly. No purple stretch marks or striae. No tenderness. EXTREMITIES:  No tremors. Bilateral peripheral pulses palpable. No pedal edema. Ten-gram monofilament exam, bilateral lower extremity intact. NEUROLOGIC:  No focal motor or sensory deficit. SKIN:  No rash. Normal.  Moist.  Good turgor. MUSCULOSKELETAL:  Normal range of motion of all joints. ASSESSMENT:  1. Hyperthyroidism secondary to Graves' disease: Patient had elevated TSI antibodies. Patient is on methimazole 5 mg one tablet daily.    2. Multinodular goiter: Patient underwent biopsy on 9/3/15 which was nondiagnostic specimen consisting of scant blood and watery colloid. Recent ultrasound shows 3 new thyroid nodules on the left thyroid lobe. Recent US thyroid shows right thyroid nodule is smaller in size per US thyroid 9/15/16. Left thyroid nodules and no. 3. Osteoporosis: Likely secondary to vitamin D deficiency. Patient is on ergocalciferol 50,000 units once a week. Recent BMD shows osteoporosis. 4. Hypertension: Well controlled. Patient is on losartan-HCTZ 100-12.5 mg once daily. 5. Vitamin D deficiency: Likely cause of osteoporosis. Patient is on ergocalciferol 50,000 units once a week. Most recent level is normal.  PLAN:  1. I will decrease Methimazole 5 mg 1 tab po daily Mon to Friday and 1/2 tab on Saturday and Sunday. 2. I will start alendronate 70 mg po q week. 3. Side effects of alendronate were discussed with patient. 4. I will continue ergocalciferol 50,000 units once a week. 5. I will have pt RTC in 5 months. 6. I will repeat US thyroid in 5 months prior to clinic visit. 7. I will have patient return to endocrine clinic in 5 months. ORDERS:  Orders Placed This Encounter   â¢ US Thyroid Only   â¢ methimazole (TAPAZOLE) 5 MG tablet   â¢ alendronate (FOSAMAX) 70 MG tablet     An extended discussion of the above diagnoses, workup as well as the risk/benefits of the treatment plan was conducted with the patient, who verbalized understanding. All questions were answered. Patient is to call if there are any problems. Return to clinic: 4-6 months    I have spent 30 minutes of face to face time with this patient in which greater than 50% of the time was spent in counseling and coordination of care. FOLLOWUP:  Return in about 5 months (around 7/1/2017).     Rell Overton MD  2/1/2017  67 year old male with PMHx of HTN, Thyroid nodule, HLD BIBEMS from home with AMS. Pt's ex wife concerned about pt reporting that he has been acting strange at home and that he has been saying strange things to their 11 year old son. Pt reports that he is depressed and that he owes money to the IRS. As per EMS pt is on xanax, Ambien and melatonin. Pt denies fever but endorses difficulty breathing, seeing things that other people do not. Pt has not taken xanax in 3 or 4 days. Pt drinks EtOH but denies withdrawal symptoms, non-smoker. Denies SI. Ex wife states speech was slurred. Admitted for acute toxic metabolic encephalopathy with polysubstance abuse, AMARJIT, and hyponatremia.

## 2023-01-19 NOTE — H&P ADULT - NSHPADDITIONALINFOADULT_GEN_ALL_CORE
GI prophylaxis: Eating.  DVT prophylaxis: SCDs and subcu heparin.  Advance directives: Full code. Ex-wife ? medical surrogate.     OT PT evaluation.   SW to see.     Care plan discussed with patient in details. Questions answered. In agreement.

## 2023-01-20 NOTE — PROGRESS NOTE ADULT - SUBJECTIVE AND OBJECTIVE BOX
asked to see Patient by Dr. Will    HPI:    67 year old male with PMHx of HTN, Thyroid Goiter s/p thyroidectomy, HLD BIBEMS from home with AMS  Patient does have an extensive drinking history.  On admission had creatinine of 2.7, no hx. kidney disease, creatinine was normal in 2019, kidneys normal size on sono  Patient with elevated transaminases to 700s, sono shows echogenic liver, some ascites. , INR 2.5  Patient was being treated for Alcohol withdrawal  today patient had episode of emesis, and became more hypoxic.  now on NRB  echo today shows ef 15%, (awaiting official report)  ammonia 55  Tox screen on admission was postiive for cocaine, benzo's  P  ROS cant obtain Patient somnolent      MEDICATIONS  (STANDING):  cefTRIAXone Injectable. 1000 milliGRAM(s) IV Push every 24 hours  folic acid 1 milliGRAM(s) Oral daily  heparin   Injectable 5000 Unit(s) SubCutaneous every 8 hours  lactulose Syrup 10 Gram(s) Oral two times a day  levothyroxine 100 MICROGram(s) Oral daily  LORazepam   Injectable   IV Push   LORazepam   Injectable 2 milliGRAM(s) IV Push every 4 hours  LORazepam   Injectable 1.5 milliGRAM(s) IV Push every 4 hours  multivitamin 1 Tablet(s) Oral daily  omega-3-Acid Ethyl Esters 2 Gram(s) Oral two times a day  phytonadione   Solution 5 milliGRAM(s) Oral daily    MEDICATIONS  (PRN):  acetaminophen     Tablet .. 650 milliGRAM(s) Oral every 6 hours PRN Temp greater or equal to 38C (100.4F), Mild Pain (1 - 3)  albuterol    90 MICROgram(s) HFA Inhaler 2 Puff(s) Inhalation every 6 hours PRN Shortness of Breath  aluminum hydroxide/magnesium hydroxide/simethicone Suspension 30 milliLiter(s) Oral every 4 hours PRN Dyspepsia  hydrALAZINE Injectable 10 milliGRAM(s) IV Push three times a day PRN SBP > 150  LORazepam   Injectable 2 milliGRAM(s) IV Push every 2 hours PRN Symptom-triggered: 2 point increase in CIWA -Ar score and a total score of 7 or LESS  melatonin 3 milliGRAM(s) Oral at bedtime PRN Insomnia  ondansetron Injectable 4 milliGRAM(s) IV Push every 8 hours PRN Nausea and/or Vomiting      ICU Vital Signs Last 24 Hrs  T(C): 36 (2023 22:00), Max: 36.3 (2023 17:54)  T(F): 96.8 (2023 22:00), Max: 97.4 (2023 17:54)  HR: 98 (2023 14:00) (92 - 106)  BP: 89/63 (2023 14:00) (89/63 - 133/66)  BP(mean): 72 (2023 14:00) (72 - 98)  ABP: --  ABP(mean): --  RR: 34 (2023 14:00) (15 - 46)  SpO2: 97% (2023 14:00) (95% - 100%)    O2 Parameters below as of 2023 14:00  Patient On (Oxygen Delivery Method): mask, nonrebreather  O2 Flow (L/min): 15    Physical Exam    General - weak  HEENT nc/at, anicteric  neck - supple  lungs bilateral rhonchi  cv - rrr  abdomen soft, non tender  extremities - 2+ edema  neuro lethargic weak                          14.6   5.05  )-----------( 222      ( 2023 06:22 )             43.6       01-20    135  |  102  |  78<H>  ----------------------------<  128<H>  4.3   |  24  |  2.30<H>    Ca    8.8      2023 06:22  Phos  6.1     01-20  Mg     2.4     01-20    TPro  7.3  /  Alb  3.0<L>  /  TBili  1.4<H>  /  DBili  x   /  AST  634<H>  /  ALT  743<H>  /  AlkPhos  118  01-20      CARDIAC MARKERS ( 2023 06:22 )  x     / x     / 505 U/L / x     / x      CARDIAC MARKERS ( 2023 09:48 )  x     / x     / 857 U/L / x     / x        Urinalysis Basic - ( 2023 21:28 )    Color: Yellow / Appearance: Clear / S.020 / pH: x  Gluc: x / Ketone: Negative  / Bili: Negative / Urobili: 1   Blood: x / Protein: 100 / Nitrite: Negative   Leuk Esterase: Negative / RBC: Negative /HPF / WBC 3-5 /HPF   Sq Epi: x / Non Sq Epi: Occasional / Bacteria: Moderate    DVT Prophylaxis:    Inc INR                                                             Advanced Directives: Full Code

## 2023-01-20 NOTE — CONSULT NOTE ADULT - SUBJECTIVE AND OBJECTIVE BOX
PCP:    REQUESTING PHYSICIAN:    REASON FOR CONSULT:    CHIEF COMPLAINT:    HPI:   67 year old male with PMHx of HTN, Thyroid Goiter s/p thyroidectomy, HLD BIBEMS from home with AMS. Pt's ex wife concerned about pt reporting that he has been acting strange at home and that he has been saying strange things to their 11 year old son. Pt reports that he is depressed and that he owes money to the IRS. As per EMS pt is on xanax, Ambien and melatonin. Pt denies fever but endorses difficulty breathing, seeing things that other people do not. Pt has not taken xanax in 3 or 4 days. Pt drinks EtOH but denies withdrawal symptoms, non-smoker. Denies SI. Ex wife states speech was slurred. Patient was admitted with altered mental status. Patient was seen in the room and doing better. Patient knows that he is in hospital. Denies any complaints of chest pain, shortness of breath, headache, dizziness, nausea, vomiting or abdominal pain. No fall or head injury. Denies using any drugs.     1/20/23 Patient  was receiving sedation , did vomit this morning , subsequent worsening of hypoxia , patient was intubated , patient noted to have severe ventricular systolic dysfunction , dilated aorta , as per his ex wife , patient was not seeing any physician for several  years , patient does drink regularly   patient cxr at the time of admission ,showed no evidence of pleural effusion , however todays CXR showed bilateral pleural effusion ,     PAST MEDICAL & SURGICAL HISTORY:  Intranasal mass      Hypertension, unspecified type      Hyperlipidemia, unspecified hyperlipidemia type      Thyroid nodule  left thyroidectomy      Asthma      H/O partial thyroidectomy  2008      S/P T&amp;A (status post tonsillectomy and adenoidectomy)  1965          Allergies    No Known Allergies    Intolerances        SOCIAL HISTORY:    alcoholic  positive for cocaine , THC       FAMILY HISTORY:  unknown     MEDICATIONS:Home Medications:  ALPRAZolam 1 mg oral tablet: 1 tab(s) orally every 8 hours, As Needed - for anxiety (19 Jan 2023 09:53)  losartan 100 mg oral tablet: 1 tab(s) orally once a day (in the morning) (19 Jan 2023 09:53)  Multiple Vitamins oral tablet: 1 tab(s) orally once a day. last dose 2/ 12/ 2019 (19 Jan 2023 09:53)  rosuvastatin 5 mg oral tablet: 1 tab(s) orally once a day (in the morning) (19 Jan 2023 09:53)  sildenafil 100 mg oral tablet: 1 tab(s) orally once a day, As Needed (19 Jan 2023 09:53)  Synthroid 100 mcg (0.1 mg) oral tablet: 1 tab(s) orally once a day (in the morning) (19 Jan 2023 09:53)  Ventolin HFA 90 mcg/inh inhalation aerosol: 2 puff(s) inhaled 4 times a day, As Needed (19 Jan 2023 09:53)  zolpidem 10 mg oral tablet: 1 tab(s) orally once a day (at bedtime), As Needed (19 Jan 2023 09:53)    MEDICATIONS  (STANDING):  allopurinol 100 milliGRAM(s) Oral daily  chlorhexidine 0.12% Liquid 15 milliLiter(s) Oral Mucosa every 12 hours  dexMEDEtomidine Bolus 110 MICROGram(s) IV Bolus once  dexMEDEtomidine Infusion 0.7 MICROgram(s)/kG/Hr (19.1 mL/Hr) IV Continuous <Continuous>  famotidine    Tablet 20 milliGRAM(s) Oral daily  folic acid 1 milliGRAM(s) Oral daily  lactulose Syrup 10 Gram(s) Oral two times a day  levothyroxine 100 MICROGram(s) Oral daily  LORazepam   Injectable 1 milliGRAM(s) IV Push every 6 hours  norepinephrine Infusion 0.1 MICROgram(s)/kG/Min (20.4 mL/Hr) IV Continuous <Continuous>  piperacillin/tazobactam IVPB. 3.375 Gram(s) IV Intermittent once  piperacillin/tazobactam IVPB.- 3.375 Gram(s) IV Intermittent once  thiamine Injectable 100 milliGRAM(s) IV Push daily    MEDICATIONS  (PRN):  albuterol    90 MICROgram(s) HFA Inhaler 2 Puff(s) Inhalation every 6 hours PRN Shortness of Breath  aluminum hydroxide/magnesium hydroxide/simethicone Suspension 30 milliLiter(s) Oral every 4 hours PRN Dyspepsia  hydrALAZINE Injectable 10 milliGRAM(s) IV Push three times a day PRN SBP > 150  ondansetron Injectable 4 milliGRAM(s) IV Push every 8 hours PRN Nausea and/or Vomiting      REVIEW OF SYSTEMS:    intubated  unable to obtain    Vital Signs Last 24 Hrs  T(C): 36 (19 Jan 2023 22:00), Max: 36.3 (19 Jan 2023 17:54)  T(F): 96.8 (19 Jan 2023 22:00), Max: 97.4 (19 Jan 2023 17:54)  HR: 100 (20 Jan 2023 15:36) (92 - 105)  BP: 89/63 (20 Jan 2023 14:00) (89/63 - 133/66)  BP(mean): 72 (20 Jan 2023 14:00) (72 - 98)  RR: 34 (20 Jan 2023 14:00) (15 - 46)  SpO2: 93% (20 Jan 2023 15:36) (93% - 100%)    Parameters below as of 20 Jan 2023 14:00  Patient On (Oxygen Delivery Method): mask, nonrebreather  O2 Flow (L/min): 15      I&O's Summary      PHYSICAL EXAM:    Constitutional: NAD, intubate obese   HEENT: PERR, EOMI,  No oral cyananosis.  Neck:  supple,  No JVD  Respiratory: Breath sounds are clear bilaterally, No wheezing, rales or rhonchi  Cardiovascular: S1 and S2, regular tachycardic  and rhythm, no Murmurs, gallops or rubs  Gastrointestinal: Bowel Sounds present, soft, nontender.   Extremities: No peripheral edema. No clubbing or cyanosis.  Vascular: 2+ peripheral pulses  Neurological: intubated   Musculoskeletal: no calf tenderness.  Skin: No rashes.      LABS: All Labs Reviewed:                        14.6   5.05  )-----------( 222      ( 20 Jan 2023 06:22 )             43.6                         14.1   6.30  )-----------( 251      ( 18 Jan 2023 18:26 )             42.1     20 Jan 2023 06:22    135    |  102    |  78     ----------------------------<  128    4.3     |  24     |  2.30   19 Jan 2023 18:00    131    |  101    |  83     ----------------------------<  152    4.7     |  19     |  2.62   19 Jan 2023 09:48    133    |  100    |  75     ----------------------------<  124    5.0     |  21     |  2.62     Ca    8.8        20 Jan 2023 06:22  Ca    8.8        19 Jan 2023 18:00  Ca    8.9        19 Jan 2023 09:48  Phos  6.1       20 Jan 2023 06:22  Phos  6.1       19 Jan 2023 18:00  Phos  6.6       19 Jan 2023 09:48  Mg     2.4       20 Jan 2023 06:22  Mg     2.5       19 Jan 2023 18:00  Mg     2.4       19 Jan 2023 09:48    TPro  7.3    /  Alb  3.0    /  TBili  1.4    /  DBili  x      /  AST  634    /  ALT  743    /  AlkPhos  118    20 Jan 2023 06:22  TPro  7.4    /  Alb  3.1    /  TBili  1.2    /  DBili  0.6    /  AST  757    /  ALT  825    /  AlkPhos  116    19 Jan 2023 18:00  TPro  7.3    /  Alb  3.2    /  TBili  1.4    /  DBili  0.7    /  AST  806    /  ALT  813    /  AlkPhos  115    19 Jan 2023 09:48    PT/INR - ( 20 Jan 2023 06:22 )   PT: 25.1 sec;   INR: 2.15 ratio         PTT - ( 20 Jan 2023 06:22 )  PTT:37.1 sec  CARDIAC MARKERS ( 20 Jan 2023 06:22 )  x     / x     / 505 U/L / x     / x      CARDIAC MARKERS ( 19 Jan 2023 09:48 )  x     / x     / 857 U/L / x     / x        ECHO reviewed , severely dilated LV severe global left ventricular dysfunction ,  mild RV dysfunction ,  dilated aortic root , ascending aorta , moderate AI     < from: 12 Lead ECG (01.18.23 @ 20:57) >  Sinus tachycardia with Premature supraventricular complexes  Pulmonary disease pattern  Septal infarct , age undetermined  Abnormal ECG  When compared with ECG of 12-FEB-2019 11:55,  Premature supraventricular complexes are now Present  Vent. rate has increased BY44 BPM  Nonspecific T wave abnormality, worse in Inferior leads  T wave inversion more evident in Lateral leads  Confirmed by MD ALVARADO PAUL ([id number]) on 1/19/2023 9:01:18 AM    < end of copied text >  < from: Xray Chest 1 View-PORTABLE IMMEDIATE (Xray Chest 1 View-PORTABLE IMMEDIATE .) (01.20.23 @ 15:30) >    Impression:    There are small to moderate bilateral pleural effusions with lower lobe   consolidations.    No pulmonary venous congestion or pneumothorax.    Support lines and catheters as above.    --- End of Report ---    < end of copied text >  < from: CT Abdomen and Pelvis No Cont (01.18.23 @ 20:13) >  LOWER CHEST: Small bilateral pleural effusions and partial compressive   atelectasis of both lower lobes. Cardiomegaly.    < end of copied text >  < from: CT Abdomen and Pelvis No Cont (01.18.23 @ 20:13) >    IMPRESSION:  No acute intra-abdominal pathology.    Small volume abdominopelvic ascites.    --- End of Report ---    < end of copied text >      Blood Culture:     01-19 @ 09:48  TSH: 6.30  01-18 @ 18:26  TSH: 7.76      RADIOLOGY/EKG:

## 2023-01-20 NOTE — CONSULT NOTE ADULT - PROBLEM SELECTOR RECOMMENDATION 9
patient with longstanding alcoholism , admitted AMS positive for cocaine THC , was sedation , worsening of respiratory status  hypoxia , now intubated , likely aspiration pneumonia , poor mental status , with possible component of CHF  , pleural effusion , now mild hypotensive , on pressor    check TSH level

## 2023-01-20 NOTE — PROGRESS NOTE ADULT - ASSESSMENT
A:    67yMale  HD #    Here for:    1.    This patient requires critical care for support of one or more vital organ systems with a high probability of imminent or life threatening deterioration in his/her condition    P:    Neuro: GCS 15. Monitor for delirium.  Continue to optimize pain control. Serial Neurologic assessments.    HEENT: No issues.    CV: Continue hemodynamic monitoring    Pulm: Pulmonary toilet.  Continue incentive spirometer.  Chest PT.  Encourage OOB to chair and ambulation. Nebs. f/u ABG, CXR.    GI/Nutrition: Cont diet, bowel regimen.    /Renal: Monitor UOP. Monitor BMP.  Replete Lytes as needed.    HEME- Chemical and mechanical DVT ppx. f/u CBC. f/u coags as needed.    ID:  Cont abx. f/u Cx's.    Lines/Tubes:     Endo: Maintain euglycemia.    Skin:  Cont skin care, pressure ulcer prevention.    Dispo: Cont critical care.    TOTAL CRITICAL CARE TIME:   (EXCLUSIVE of any non bundled procedures)    Note: This time spent INCLUDES time spent directly as this patient's bedside with evaluation, review of chart including review of laboratory and imaging studies, interpretation of vital signs and cardiac output measurements, any necessary ventilator management, and time spent discussing plan of care with patient and family, including goals of care discussion.     A:    67M  HD # 3  FULL CODE    Here for:    1. Acute mixed resp failure 2/2  2. PNA, aspiration  3. Shock, suspect septic 2/2 above  4. Alcohol withdrawal syndrome  5. Alcoholic hepatitis  6. CM, suspect alcohol induced  7. AMARJIT ? CKD  8. Elevated ammonia    This patient requires critical care for support of one or more vital organ systems with a high probability of imminent or life threatening deterioration in his/her condition    P:    Alcohol withdrawal syndrome  Complicated by aspiration PNA, septic shock, severe CM, MSOF    Pt required/s my immediate and continued care and presence     Analgosedation with precedex and ATC + PRN BZD; avoid propofol in shock state and severe CM. Daily sedation vacations. Goal RASS 0 to -2.  HD monitoring, start levophed to maintain MAP > 65; EF < 15%; severe LV dysfxn with global dysfxn; ?alcohol +/- cocaine use; will need further w/u when stabilized, cardiology involved, plan per Cards  s/p emergent intubation, vent settings 16/500/5/1.0; ABG hypercarbic, not overbreathing, increase RR to 222, f/u ABG; actively titrating and manipulating ventilator to optimize ventilation and oxygenation, acid-base status while minimizing barotrauma  NPO, NGT placed, start TF tomorrow  Lactulose for hyperammonia  Broad spectrum abx with zosyn empiric coverage for asp PNA, f/u cultures, send RC  VTE ppx SCD only given elevated INR, f/u in AM  AMARJIT, ? CKD, no indications for emergent RRT; place leal, monitor I/O's, avoid renal toxic meds; bicarb OK  Alcoholic hepatitis, GI involved  f/u labs, replete lytes PRN  Central vascular access obtained  Hgb 14.6, Plt 222  f/u labs, replete lytes PRN    Dispo: Accept to critical care. Intubate, line up, pressors, resuscitation.     TOTAL CRITICAL CARE TIME:  80 minutes (EXCLUSIVE of any non bundled procedures)    Note: This time spent INCLUDES time spent directly as this patient's bedside with evaluation, review of chart including review of laboratory and imaging studies, interpretation of vital signs and cardiac output measurements, any necessary ventilator management, and time spent discussing plan of care with patient and family, including goals of care discussion.

## 2023-01-20 NOTE — PROGRESS NOTE ADULT - ASSESSMENT
1. Elevated liver tests in hepatocellular pattern. Pattern not consistent with alcohol hepatitis. Downtrending at this time.   2. Altered mental status  3. Alcohol/substance abuse  4. AMARJIT    Recommendation  1. Check LDH as can be elevated in setting of cocaine induced acute liver injury  2. Follow up autoimmune serologies   3. Trend LFT, INR, CPK daily  4. Start vitamin K 5 mg PO x 3 days once tolerating orally  5. Monitor for alcohol withdrawal and CIWA protocol  6. Once off CIWA protocol, would monitor bowel movements and mentation as may require lactulose once tolerating PO  7. Thiamine and folate supplementation

## 2023-01-20 NOTE — PROGRESS NOTE ADULT - SUBJECTIVE AND OBJECTIVE BOX
NEPHROLOGY INTERVAL HPI/OVERNIGHT EVENTS:  23 @ 12:16      HPI: Hx obtained from ex wife.  66 yo man with PMHX of HTN,( on Losartan), hx of asthma, living alone, brought to Ed when found to be confused with hallucinations.  Per pt's wife, pt was well until a few days ago.  Yesterday noted with slurred speech and confusion and relates ETOH hx of drinking vodka/screwdriver 4-5 x/day.  On Losartan for HTN but has not seen PMD since pandemic.  No known hx of CKD.  Creat 2.76  and abnormal LFTs.  Urine positive for Cocaine and Benzo, though pt adamantly  denies  illicit drug use  CT non contrast with no intra abdominal pathology, positive for small bilateral pleural effusion with cardiomegaly.    PMHX and PSHX.  --HTN  --Thyroid goiter --post Thyroidectomy  --Post intranasal polyp resection.  --Hx of Asthma    MEDICATIONS  (STANDING):  folic acid 1 milliGRAM(s) Oral daily  heparin   Injectable 5000 Unit(s) SubCutaneous every 8 hours  lactulose Syrup 10 Gram(s) Oral two times a day  levothyroxine 100 MICROGram(s) Oral daily  LORazepam   Injectable   IV Push   LORazepam   Injectable 2 milliGRAM(s) IV Push every 4 hours  LORazepam   Injectable 1.5 milliGRAM(s) IV Push every 4 hours  multivitamin 1 Tablet(s) Oral daily  omega-3-Acid Ethyl Esters 2 Gram(s) Oral two times a day  phytonadione   Solution 5 milliGRAM(s) Oral daily    MEDICATIONS  (PRN):  acetaminophen     Tablet .. 650 milliGRAM(s) Oral every 6 hours PRN Temp greater or equal to 38C (100.4F), Mild Pain (1 - 3)  albuterol    90 MICROgram(s) HFA Inhaler 2 Puff(s) Inhalation every 6 hours PRN Shortness of Breath  aluminum hydroxide/magnesium hydroxide/simethicone Suspension 30 milliLiter(s) Oral every 4 hours PRN Dyspepsia  hydrALAZINE Injectable 10 milliGRAM(s) IV Push three times a day PRN SBP > 150  LORazepam   Injectable 2 milliGRAM(s) IV Push every 2 hours PRN Symptom-triggered: 2 point increase in CIWA -Ar score and a total score of 7 or LESS  melatonin 3 milliGRAM(s) Oral at bedtime PRN Insomnia  ondansetron Injectable 4 milliGRAM(s) IV Push every 8 hours PRN Nausea and/or Vomiting      Allergies    No Known Allergies    Intolerances        I&O's Detail        Vital Signs Last 24 Hrs  T(C): 36 (2023 22:00), Max: 36.3 (2023 17:54)  T(F): 96.8 (2023 22:00), Max: 97.4 (2023 17:54)  HR: 97 (2023 11:00) (92 - 106)  BP: 104/82 (2023 11:00) (104/82 - 133/66)  BP(mean): 90 (2023 11:00) (78 - 99)  RR: 29 (2023 11:00) (15 - 47)  SpO2: 95% (2023 11:00) (93% - 98%)    Parameters below as of 2023 11:00  Patient On (Oxygen Delivery Method): nasal cannula  O2 Flow (L/min): 2    Daily     Daily Weight in k.1 (2023 06:26)    PHYSICAL EXAM:  General: alert. awake Ox3  HEENT: MMM  CV: s1s2 rrr  LUNGS: B/L CTA  EXT: no edema    LABS:                        14.6   5.05  )-----------( 222      ( 2023 06:22 )             43.6     01-20    135  |  102  |  78<H>  ----------------------------<  128<H>  4.3   |  24  |  2.30<H>    Ca    8.8      2023 06:22  Phos  6.1     01-20  Mg     2.4     01-20    TPro  7.3  /  Alb  3.0<L>  /  TBili  1.4<H>  /  DBili  x   /  AST  634<H>  /  ALT  743<H>  /  AlkPhos  118  01-20    PT/INR - ( 2023 06:22 )   PT: 25.1 sec;   INR: 2.15 ratio         PTT - ( 2023 06:22 )  PTT:37.1 sec  Urinalysis Basic - ( 2023 21:28 )    Color: Yellow / Appearance: Clear / S.020 / pH: x  Gluc: x / Ketone: Negative  / Bili: Negative / Urobili: 1   Blood: x / Protein: 100 / Nitrite: Negative   Leuk Esterase: Negative / RBC: Negative /HPF / WBC 3-5 /HPF   Sq Epi: x / Non Sq Epi: Occasional / Bacteria: Moderate      Intact PTH: 61 pg/mL ( @ 06:22)  Magnesium, Serum: 2.4 mg/dL ( @ 06:22)  Phosphorus Level, Serum: 6.1 mg/dL ( @ 06:22)  Phosphorus Level, Serum: 6.1 mg/dL ( @ 18:00)  Magnesium, Serum: 2.5 mg/dL ( @ 18:00)       NEPHROLOGY INTERVAL HPI/OVERNIGHT EVENTS:  23 @ 12:16     pt with episode of emesis with hypoxia, concern for aspiration with adjustment of his ativan dose.  wife at bedside.  unofficial echo with ef of 15%  HPI: Hx obtained from ex wife.  66 yo man with PMHX of HTN,( on Losartan), hx of asthma, living alone, brought to Ed when found to be confused with hallucinations.  Per pt's wife, pt was well until a few days ago.  Yesterday noted with slurred speech and confusion and relates ETOH hx of drinking vodka/screwdriver 4-5 x/day.  On Losartan for HTN but has not seen PMD since pandemic.  No known hx of CKD.  Creat 2.76  and abnormal LFTs.  Urine positive for Cocaine and Benzo, though pt adamantly  denies  illicit drug use  CT non contrast with no intra abdominal pathology, positive for small bilateral pleural effusion with cardiomegaly.    PMHX and PSHX.  --HTN  --Thyroid goiter --post Thyroidectomy  --Post intranasal polyp resection.  --Hx of Asthma    MEDICATIONS  (STANDING):  folic acid 1 milliGRAM(s) Oral daily  heparin   Injectable 5000 Unit(s) SubCutaneous every 8 hours  lactulose Syrup 10 Gram(s) Oral two times a day  levothyroxine 100 MICROGram(s) Oral daily  LORazepam   Injectable   IV Push   LORazepam   Injectable 2 milliGRAM(s) IV Push every 4 hours  LORazepam   Injectable 1.5 milliGRAM(s) IV Push every 4 hours  multivitamin 1 Tablet(s) Oral daily  omega-3-Acid Ethyl Esters 2 Gram(s) Oral two times a day  phytonadione   Solution 5 milliGRAM(s) Oral daily    MEDICATIONS  (PRN):  acetaminophen     Tablet .. 650 milliGRAM(s) Oral every 6 hours PRN Temp greater or equal to 38C (100.4F), Mild Pain (1 - 3)  albuterol    90 MICROgram(s) HFA Inhaler 2 Puff(s) Inhalation every 6 hours PRN Shortness of Breath  aluminum hydroxide/magnesium hydroxide/simethicone Suspension 30 milliLiter(s) Oral every 4 hours PRN Dyspepsia  hydrALAZINE Injectable 10 milliGRAM(s) IV Push three times a day PRN SBP > 150  LORazepam   Injectable 2 milliGRAM(s) IV Push every 2 hours PRN Symptom-triggered: 2 point increase in CIWA -Ar score and a total score of 7 or LESS  melatonin 3 milliGRAM(s) Oral at bedtime PRN Insomnia  ondansetron Injectable 4 milliGRAM(s) IV Push every 8 hours PRN Nausea and/or Vomiting      Allergies    No Known Allergies    Intolerances        I&O's Detail        Vital Signs Last 24 Hrs  T(C): 36 (2023 22:00), Max: 36.3 (2023 17:54)  T(F): 96.8 (2023 22:00), Max: 97.4 (2023 17:54)  HR: 97 (2023 11:00) (92 - 106)  BP: 104/82 (2023 11:00) (104/82 - 133/66)  BP(mean): 90 (2023 11:00) (78 - 99)  RR: 29 (2023 11:00) (15 - 47)  SpO2: 95% (2023 11:00) (93% - 98%)    Parameters below as of 2023 11:00  Patient On (Oxygen Delivery Method): nasal cannula  O2 Flow (L/min): 2    Daily     Daily Weight in k.1 (2023 06:26)    PHYSICAL EXAM:  General: somnolent  HEENT: MMM  CV: s1s2 rrr  LUNGS: B/L CTA  EXT: no edema    LABS:                        14.6   5.05  )-----------( 222      ( 2023 06:22 )             43.6     -20    135  |  102  |  78<H>  ----------------------------<  128<H>  4.3   |  24  |  2.30<H>    Ca    8.8      2023 06:22  Phos  6.1     -  Mg     2.4     20    TPro  7.3  /  Alb  3.0<L>  /  TBili  1.4<H>  /  DBili  x   /  AST  634<H>  /  ALT  743<H>  /  AlkPhos  118  -20    PT/INR - ( 2023 06:22 )   PT: 25.1 sec;   INR: 2.15 ratio         PTT - ( 2023 06:22 )  PTT:37.1 sec  Urinalysis Basic - ( 2023 21:28 )    Color: Yellow / Appearance: Clear / S.020 / pH: x  Gluc: x / Ketone: Negative  / Bili: Negative / Urobili: 1   Blood: x / Protein: 100 / Nitrite: Negative   Leuk Esterase: Negative / RBC: Negative /HPF / WBC 3-5 /HPF   Sq Epi: x / Non Sq Epi: Occasional / Bacteria: Moderate      Intact PTH: 61 pg/mL ( @ 06:22)  Magnesium, Serum: 2.4 mg/dL ( @ 06:22)  Phosphorus Level, Serum: 6.1 mg/dL ( @ 06:22)  Phosphorus Level, Serum: 6.1 mg/dL ( @ 18:00)  Magnesium, Serum: 2.5 mg/dL ( @ 18:00)

## 2023-01-20 NOTE — PROGRESS NOTE ADULT - SUBJECTIVE AND OBJECTIVE BOX
CHIEF COMPLAINT/INTERVAL HISTORY:    Patient is a 67y old  Male who presents with a chief complaint of Altered mental status. (2023 10:53)      HPI:   67 year old male with PMHx of HTN, Thyroid Goiter s/p thyroidectomy, HLD BIBEMS from home with AMS. Pt's ex wife concerned about pt reporting that he has been acting strange at home and that he has been saying strange things to their 11 year old son. Pt reports that he is depressed and that he owes money to the IRS. As per EMS pt is on xanax, Ambien and melatonin. Pt denies fever but endorses difficulty breathing, seeing things that other people do not. Pt has not taken xanax in 3 or 4 days. Pt drinks EtOH but denies withdrawal symptoms, non-smoker. Denies SI. Ex wife states speech was slurred. Patient was admitted with altered mental status. Patient was seen in the room and doing better. Patient knows that he is in hospital. Denies any complaints of chest pain, shortness of breath, headache, dizziness, nausea, vomiting or abdominal pain. No fall or head injury. Denies using any drugs.     TTE EF 15%; pt somnolent from Ativan      SUBJECTIVE & OBJECTIVE: Pt seen and examined at bedside.     ICU Vital Signs Last 24 Hrs  T(C): 36 (2023 22:00), Max: 36.3 (2023 17:54)  T(F): 96.8 (2023 22:00), Max: 97.4 (2023 17:54)  HR: 97 (2023 11:00) (92 - 106)  BP: 104/82 (2023 11:00) (104/82 - 133/66)  BP(mean): 90 (2023 11:00) (78 - 99)  ABP: --  ABP(mean): --  RR: 29 (2023 11:00) (15 - 47)  SpO2: 95% (2023 11:00) (93% - 98%)    O2 Parameters below as of 2023 11:00  Patient On (Oxygen Delivery Method): nasal cannula  O2 Flow (L/min): 2            MEDICATIONS  (STANDING):  folic acid 1 milliGRAM(s) Oral daily  heparin   Injectable 5000 Unit(s) SubCutaneous every 8 hours  lactulose Syrup 10 Gram(s) Oral two times a day  levothyroxine 100 MICROGram(s) Oral daily  LORazepam   Injectable   IV Push   LORazepam   Injectable 2 milliGRAM(s) IV Push every 4 hours  LORazepam   Injectable 1.5 milliGRAM(s) IV Push every 4 hours  multivitamin 1 Tablet(s) Oral daily  omega-3-Acid Ethyl Esters 2 Gram(s) Oral two times a day  phytonadione   Solution 5 milliGRAM(s) Oral daily    MEDICATIONS  (PRN):  acetaminophen     Tablet .. 650 milliGRAM(s) Oral every 6 hours PRN Temp greater or equal to 38C (100.4F), Mild Pain (1 - 3)  albuterol    90 MICROgram(s) HFA Inhaler 2 Puff(s) Inhalation every 6 hours PRN Shortness of Breath  aluminum hydroxide/magnesium hydroxide/simethicone Suspension 30 milliLiter(s) Oral every 4 hours PRN Dyspepsia  hydrALAZINE Injectable 10 milliGRAM(s) IV Push three times a day PRN SBP > 150  LORazepam   Injectable 2 milliGRAM(s) IV Push every 2 hours PRN Symptom-triggered: 2 point increase in CIWA -Ar score and a total score of 7 or LESS  melatonin 3 milliGRAM(s) Oral at bedtime PRN Insomnia  ondansetron Injectable 4 milliGRAM(s) IV Push every 8 hours PRN Nausea and/or Vomiting        PHYSICAL EXAM:    GENERAL: NAD, well-groomed, well-developed  NERVOUS SYSTEM:  Alert & Oriented X3, Motor Strength 5/5 B/L upper and lower extremities; DTRs 2+ intact and symmetric  CHEST/LUNG: Clear to auscultation bilaterally; No rales, rhonchi, wheezing, or rubs  HEART: Regular rate and rhythm; No murmurs, rubs, or gallops  ABDOMEN: Soft, Nontender, Nondistended; Bowel sounds present  EXTREMITIES:  2+ Peripheral Pulses, No clubbing, cyanosis, or edema    LABS:                        14.6   5.05  )-----------( 222      ( 2023 06:22 )             43.6     01-20    135  |  102  |  78<H>  ----------------------------<  128<H>  4.3   |  24  |  2.30<H>    Ca    8.8      2023 06:22  Phos  6.1       Mg     2.4     -    TPro  7.3  /  Alb  3.0<L>  /  TBili  1.4<H>  /  DBili  x   /  AST  634<H>  /  ALT  743<H>  /  AlkPhos  118  -20    PT/INR - ( 2023 06:22 )   PT: 25.1 sec;   INR: 2.15 ratio         PTT - ( 2023 06:22 )  PTT:37.1 sec  Urinalysis Basic - ( 2023 21:28 )    Color: Yellow / Appearance: Clear / S.020 / pH: x  Gluc: x / Ketone: Negative  / Bili: Negative / Urobili: 1   Blood: x / Protein: 100 / Nitrite: Negative   Leuk Esterase: Negative / RBC: Negative /HPF / WBC 3-5 /HPF   Sq Epi: x / Non Sq Epi: Occasional / Bacteria: Moderate      12 Lead ECG (23 @ 20:57) >    Diagnosis Line Sinus tachycardia with Premature supraventricular complexes  Pulmonary disease pattern  Septal infarct , age undetermined  Abnormal ECG  When compared with ECG of 2019 11:55,  Premature supraventricular complexes are now Present  Vent. rate has increased BY44 BPM  Nonspecific T wave abnormality, worse in Inferior leads  T wave inversion more evident in Lateral leads      < from: US Abdomen Complete (23 @ 16:17) >  Technically limited ultrasound.  Coarsely echogenic liver suggestive of chronic liver disease.  Small amount of ascites.  Unable to visualize the portal veins.  Patent hepatic veins.    Home Medications:  ALPRAZolam 1 mg oral tablet: 1 tab(s) orally every 8 hours, As Needed - for anxiety (2023 09:53)  losartan 100 mg oral tablet: 1 tab(s) orally once a day (in the morning) (2023 09:53)  Multiple Vitamins oral tablet: 1 tab(s) orally once a day. last dose 2019 (2023 09:53)  rosuvastatin 5 mg oral tablet: 1 tab(s) orally once a day (in the morning) (2023 09:53)  sildenafil 100 mg oral tablet: 1 tab(s) orally once a day, As Needed (2023 09:53)  Synthroid 100 mcg (0.1 mg) oral tablet: 1 tab(s) orally once a day (in the morning) (2023 09:53)  Ventolin HFA 90 mcg/inh inhalation aerosol: 2 puff(s) inhaled 4 times a day, As Needed (2023 09:53)  zolpidem 10 mg oral tablet: 1 tab(s) orally once a day (at bedtime), As Needed (2023 09:53)          * ETOH wd  CIWA  alcoholic hepatitis    * EF 15%  probably etoh induced cardiomyopathy  dc IVF  cardio consult  abnormal EKG       * AMARJIT (acute kidney injury) and marked hyperuricemia  US kidney noted; this is acute  on ARB at home

## 2023-01-20 NOTE — PROGRESS NOTE ADULT - SUBJECTIVE AND OBJECTIVE BOX
ICU Progress Note    HPI:    S:    Pt seen and examined  HD #  Pt here for    PRESSORS: [ ] YES [ ] NO  WHICH:  DOSE:    Allergies    No Known Allergies    Intolerances        MEDICATIONS  (STANDING):  allopurinol 100 milliGRAM(s) Oral daily  chlorhexidine 0.12% Liquid 15 milliLiter(s) Oral Mucosa every 12 hours  chlorhexidine 4% Liquid 1 Application(s) Topical <User Schedule>  dexMEDEtomidine Bolus 110 MICROGram(s) IV Bolus once  dexMEDEtomidine Infusion 0.7 MICROgram(s)/kG/Hr (19.1 mL/Hr) IV Continuous <Continuous>  famotidine    Tablet 20 milliGRAM(s) Oral daily  folic acid 1 milliGRAM(s) Oral daily  lactulose Syrup 10 Gram(s) Oral two times a day  levothyroxine 100 MICROGram(s) Oral daily  LORazepam   Injectable 1 milliGRAM(s) IV Push every 6 hours  norepinephrine Infusion 0.1 MICROgram(s)/kG/Min (20.4 mL/Hr) IV Continuous <Continuous>  piperacillin/tazobactam IVPB. 3.375 Gram(s) IV Intermittent once  piperacillin/tazobactam IVPB.- 3.375 Gram(s) IV Intermittent once  thiamine Injectable 100 milliGRAM(s) IV Push daily    MEDICATIONS  (PRN):  albuterol    90 MICROgram(s) HFA Inhaler 2 Puff(s) Inhalation every 6 hours PRN Shortness of Breath  aluminum hydroxide/magnesium hydroxide/simethicone Suspension 30 milliLiter(s) Oral every 4 hours PRN Dyspepsia  hydrALAZINE Injectable 10 milliGRAM(s) IV Push three times a day PRN SBP > 150  ondansetron Injectable 4 milliGRAM(s) IV Push every 8 hours PRN Nausea and/or Vomiting  sodium chloride 0.9% lock flush 10 milliLiter(s) IV Push every 1 hour PRN Pre/post blood products, medications, blood draw, and to maintain line patency      Drug Dosing Weight    Weight (kg): 108.9 (2023 17:31)    CENTRAL LINE: [ ] YES [ ] NO  LOCATION:   DATE INSERTED:  REMOVE: [ ] YES [ ] NO  EXPLAIN:    PLUMMER: [ ] YES [ ] NO    DATE INSERTED:  REMOVE: [ ] YES [ ] NO  EXPLAIN:    A-LINE: [ ] YES [ ] NO  LOCATION:   DATE INSERTED:  REMOVE: [ ] YES [ ] NO  EXPLAIN:    PAST MEDICAL & SURGICAL HISTORY:  Intranasal mass      Hypertension, unspecified type      Hyperlipidemia, unspecified hyperlipidemia type      Thyroid nodule  left thyroidectomy      Asthma      H/O partial thyroidectomy  2008      S/P T&amp;A (status post tonsillectomy and adenoidectomy)  1965          FAMILY HISTORY:          ROS: See HPI; otherwise, all systems reviewed and negative.    O:    ICU Vital Signs Last 24 Hrs  T(C): 36 (2023 22:00), Max: 36.3 (2023 17:54)  T(F): 96.8 (2023 22:00), Max: 97.4 (2023 17:54)  HR: 100 (2023 15:36) (92 - 105)  BP: 89/63 (2023 14:00) (89/63 - 133/66)  BP(mean): 72 (2023 14:00) (72 - 98)  ABP: --  ABP(mean): --  RR: 34 (2023 14:00) (15 - 46)  SpO2: 93% (2023 15:36) (93% - 100%)    O2 Parameters below as of 2023 14:00  Patient On (Oxygen Delivery Method): mask, nonrebreather  O2 Flow (L/min): 15              I&O's Detail      Mode: AC/ CMV (Assist Control/ Continuous Mandatory Ventilation)  RR (machine): 16  TV (machine): 500  FiO2: 60  PEEP: 5  ITime: 1  PIP: 31      PE:    Constitutional: Healthy M lying in bed in NAD.   Neck: No JVD, trachea midline.   Respiratory: CTA B/L good BS B/L no W/R/R.  Cardiovascular: S1S2+ RRR no M/R/G.  Gastrointestinal: Soft, NTND.  Extremities: No peripheral edema, No cyanosis, clubbing.  Neurological: Awake, conversive, alert, no gross deficits.  Skin: No rashes, warm, moist.    LABS:    CBC Full  -  ( 2023 06:22 )  WBC Count : 5.05 K/uL  RBC Count : 4.69 M/uL  Hemoglobin : 14.6 g/dL  Hematocrit : 43.6 %  Platelet Count - Automated : 222 K/uL  Mean Cell Volume : 93.0 fl  Mean Cell Hemoglobin : 31.1 pg  Mean Cell Hemoglobin Concentration : 33.5 gm/dL  Auto Neutrophil # : x  Auto Lymphocyte # : x  Auto Monocyte # : x  Auto Eosinophil # : x  Auto Basophil # : x  Auto Neutrophil % : x  Auto Lymphocyte % : x  Auto Monocyte % : x  Auto Eosinophil % : x  Auto Basophil % : x        135  |  102  |  78<H>  ----------------------------<  128<H>  4.3   |  24  |  2.30<H>    Ca    8.8      2023 06:22  Phos  6.1       Mg     2.4         TPro  7.3  /  Alb  3.0<L>  /  TBili  1.4<H>  /  DBili  x   /  AST  634<H>  /  ALT  743<H>  /  AlkPhos  118      PT/INR - ( 2023 06:22 )   PT: 25.1 sec;   INR: 2.15 ratio         PTT - ( 2023 06:22 )  PTT:37.1 sec  Urinalysis Basic - ( 2023 21:28 )    Color: Yellow / Appearance: Clear / S.020 / pH: x  Gluc: x / Ketone: Negative  / Bili: Negative / Urobili: 1   Blood: x / Protein: 100 / Nitrite: Negative   Leuk Esterase: Negative / RBC: Negative /HPF / WBC 3-5 /HPF   Sq Epi: x / Non Sq Epi: Occasional / Bacteria: Moderate      CAPILLARY BLOOD GLUCOSE        CARDIAC MARKERS ( 2023 06:22 )  x     / x     / 505 U/L / x     / x      CARDIAC MARKERS ( 2023 09:48 )  x     / x     / 857 U/L / x     / x          LIVER FUNCTIONS - ( 2023 06:22 )  Alb: 3.0 g/dL / Pro: 7.3 gm/dL / ALK PHOS: 118 U/L / ALT: 743 U/L / AST: 634 U/L / GGT: x                ICU Progress Note    HPI:    S:    Pt seen and examined  HD # 3  FULL CODE  67M   PMHx of HTN, Thyroid Goiter s/p thyroidectomy, HLD BIBEMS from home with AMS. Pt's ex wife concerned about pt reporting that he has been acting strange at home and that he has been saying strange things to their 11 year old son. Pt reports that he is depressed and that he owes money to the [employer]. As per EMS pt is on xanax, Ambien and melatonin. Pt denies fever but endorses difficulty breathing, seeing things that other people do not. Pt has not taken xanax in 3 or 4 days. Pt drinks EtOH but denies withdrawal symptoms, non-smoker. Denies SI. Ex wife states speech was slurred. Patient was admitted with altered mental status. Patient was seen in the room and doing better. Patient knows that he is in hospital. Denies any complaints of chest pain, shortness of breath, headache, dizziness, nausea, vomiting or abdominal pain. No fall or head injury. Denies using any drugs    ICU consult today for resp distress s/p episode vomiting     PM: In resp distress, near resp arrest; intubated. Central vascular access obtained. Pressors started.     ROS: Unable to obtain    Allergies    No Known Allergies    Intolerances    MEDICATIONS  (STANDING):    allopurinol 100 milliGRAM(s) Oral daily  chlorhexidine 0.12% Liquid 15 milliLiter(s) Oral Mucosa every 12 hours  chlorhexidine 4% Liquid 1 Application(s) Topical <User Schedule>  dexMEDEtomidine Bolus 110 MICROGram(s) IV Bolus once  dexMEDEtomidine Infusion 0.7 MICROgram(s)/kG/Hr (19.1 mL/Hr) IV Continuous <Continuous>  famotidine    Tablet 20 milliGRAM(s) Oral daily  folic acid 1 milliGRAM(s) Oral daily  lactulose Syrup 10 Gram(s) Oral two times a day  levothyroxine 100 MICROGram(s) Oral daily  LORazepam   Injectable 1 milliGRAM(s) IV Push every 6 hours  norepinephrine Infusion 0.1 MICROgram(s)/kG/Min (20.4 mL/Hr) IV Continuous <Continuous>  piperacillin/tazobactam IVPB. 3.375 Gram(s) IV Intermittent once  piperacillin/tazobactam IVPB.- 3.375 Gram(s) IV Intermittent once  thiamine Injectable 100 milliGRAM(s) IV Push daily    MEDICATIONS  (PRN):    albuterol    90 MICROgram(s) HFA Inhaler 2 Puff(s) Inhalation every 6 hours PRN Shortness of Breath  aluminum hydroxide/magnesium hydroxide/simethicone Suspension 30 milliLiter(s) Oral every 4 hours PRN Dyspepsia  hydrALAZINE Injectable 10 milliGRAM(s) IV Push three times a day PRN SBP > 150  ondansetron Injectable 4 milliGRAM(s) IV Push every 8 hours PRN Nausea and/or Vomiting  sodium chloride 0.9% lock flush 10 milliLiter(s) IV Push every 1 hour PRN Pre/post blood products, medications, blood draw, and to maintain line patency    Drug Dosing Weight    Weight (kg): 108.9 (2023 17:31)    PAST MEDICAL & SURGICAL HISTORY:    Intranasal mass  Hypertension, unspecified type  Hyperlipidemia, unspecified hyperlipidemia type  Thyroid nodule  left thyroidectomy  Asthma  H/O partial thyroidectomy    S/P T&amp;A (status post tonsillectomy and adenoidectomy)  1965    FAMILY HISTORY:    ROS: See HPI; otherwise, all systems reviewed and negative.    O:    ICU Vital Signs Last 24 Hrs  T(C): 36 (2023 22:00), Max: 36.3 (2023 17:54)  T(F): 96.8 (2023 22:00), Max: 97.4 (2023 17:54)  HR: 100 (2023 15:36) (92 - 105)  BP: 89/63 (2023 14:00) (89/63 - 133/66)  BP(mean): 72 (2023 14:00) (72 - 98)  ABP: --  ABP(mean): --  RR: 34 (2023 14:00) (15 - 46)  SpO2: 93% (2023 15:36) (93% - 100%)    O2 Parameters below as of 2023 14:00  Patient On (Oxygen Delivery Method): mask, nonrebreather  O2 Flow (L/min): 15    I&O's Detail      Mode: AC/ CMV (Assist Control/ Continuous Mandatory Ventilation)  RR (machine): 16  TV (machine): 500  FiO2: 60  PEEP: 5  ITime: 1  PIP: 31    PE:    Adult M lying in bed  No JVD trachea midline  + ETT in place connected to ventilator  S1S2+  Coarse BS B/L  Abd soft NTND  No leg swelling/edema noted  Intubated, sedated  Skin pink and warm    LABS:    CBC Full  -  ( 2023 06:22 )  WBC Count : 5.05 K/uL  RBC Count : 4.69 M/uL  Hemoglobin : 14.6 g/dL  Hematocrit : 43.6 %  Platelet Count - Automated : 222 K/uL  Mean Cell Volume : 93.0 fl  Mean Cell Hemoglobin : 31.1 pg  Mean Cell Hemoglobin Concentration : 33.5 gm/dL  Auto Neutrophil # : x  Auto Lymphocyte # : x  Auto Monocyte # : x  Auto Eosinophil # : x  Auto Basophil # : x  Auto Neutrophil % : x  Auto Lymphocyte % : x  Auto Monocyte % : x  Auto Eosinophil % : x  Auto Basophil % : x        135  |  102  |  78<H>  ----------------------------<  128<H>  4.3   |  24  |  2.30<H>    Ca    8.8      2023 06:22  Phos  6.1       Mg     2.4         TPro  7.3  /  Alb  3.0<L>  /  TBili  1.4<H>  /  DBili  x   /  AST  634<H>  /  ALT  743<H>  /  AlkPhos  118      PT/INR - ( 2023 06:22 )   PT: 25.1 sec;   INR: 2.15 ratio         PTT - ( 2023 06:22 )  PTT:37.1 sec  Urinalysis Basic - ( 2023 21:28 )    Color: Yellow / Appearance: Clear / S.020 / pH: x  Gluc: x / Ketone: Negative  / Bili: Negative / Urobili: 1   Blood: x / Protein: 100 / Nitrite: Negative   Leuk Esterase: Negative / RBC: Negative /HPF / WBC 3-5 /HPF   Sq Epi: x / Non Sq Epi: Occasional / Bacteria: Moderate      CAPILLARY BLOOD GLUCOSE        CARDIAC MARKERS ( 2023 06:22 )  x     / x     / 505 U/L / x     / x      CARDIAC MARKERS ( 2023 09:48 )  x     / x     / 857 U/L / x     / x          LIVER FUNCTIONS - ( 2023 06:22 )  Alb: 3.0 g/dL / Pro: 7.3 gm/dL / ALK PHOS: 118 U/L / ALT: 743 U/L / AST: 634 U/L / GGT: x

## 2023-01-20 NOTE — CHART NOTE - NSCHARTNOTEFT_GEN_A_CORE
Pt was agitated this am, and this afternoon he vomited large amount and aspirated, initially hypoxic recovered on VM.  RR 35  Ex-wife present; pt was hallucinating at home, he drinks she doesn't know how much; he was c/o SOB recently and intermittent back pain relieved by rest.  CXR  Ceftriaxone for aspiration; has an EF of 15%, uric acide 17.3 and AMARJIT with normal looking kidneys  ICU eval  VM for now  Nursing believes the Ativan dose is adequate he was agitated; at some point he needs Neuro eval and further eval; if he was drinking at home he should not have had hallucinations  Doubt Wernicke but not impossible; he will need an MRI at some point  I will let ICU decide if IV Thiamine is indicated @500mg Pt was agitated this am, and this afternoon he vomited large amount and aspirated, initially hypoxic recovered on VM.  RR 35  Ex-wife present; pt was hallucinating at home, he drinks she doesn't know how much; he was c/o SOB recently and intermittent back pain relieved by rest.  CXR  Ceftriaxone for aspiration; has an EF of 15%, uric acide 17.3 and AMARJIT with normal looking kidneys. ETOH abuse can cause hyperuricemia.  ICU eval   for now  Nursing believes the Ativan dose is adequate he was agitated; at some point he needs Neuro eval and further eval; if he was drinking at home he should not have had hallucinations  Doubt Wernicke but not impossible; he will need an MRI at some point  I will let ICU decide if IV Thiamine is indicated @500mg

## 2023-01-20 NOTE — CONSULT NOTE ADULT - NSCONSULTADDITIONALINFOA_GEN_ALL_CORE
Occupational Therapy  Facility/Department: [facility] C4 PCU  Daily Treatment Note  NAME: Erica Stevens  : 1934  MRN: [id number]    Date of Service: 3/8/2022    Discharge Recommendations:  Subacute/Skilled Nursing Facility  OT Equipment Recommendations  Equipment Needed:  (defer to level of care)    Assessment   Performance deficits / Impairments: Decreased functional mobility ; Decreased safe awareness;Decreased balance;Decreased ADL status; Decreased endurance;Decreased strength  Assessment: Pt cont to present to OT with above noted deficits, recognizes that her strength is decreased d/t prolonged time in bed. Pt fearful of increased movement/walking this date. Completed 3 trials of standing EOB while shifting weight foot to foot and marching in place for 60-90 seconds each trials. Dep for jose care, Max A for LB dressing. Cont OT POC, recommend SNF to adress deficits and aid pt in returning to prior level of functioning safely  OT Education: OT Role;Transfer Training;Plan of Care;ADL Adaptive Strategies; Equipment;Home Exercise Program  Patient Education: Disease specific: importanc of OOB activity, encouraging standing EOB even if scared to take steps, [place] removal to rest jose area, importance of drinking and eating well in hospital. Pt demonstrated good understanding  Barriers to Learning: hearing, cognition  REQUIRES OT FOLLOW UP: Yes  Activity Tolerance  Activity Tolerance: Patient Tolerated treatment well;Patient limited by fatigue  Safety Devices  Safety Devices in place: Yes  Type of devices: All fall risk precautions in place; Left in bed;Bed alarm in place;Nurse notified;Call light within reach;Gait belt;Patient at risk for falls         Patient Diagnosis(es): There were no encounter diagnoses. has a past medical history of Hypertension, Mixed hyperlipidemia, and Type II or unspecified type diabetes mellitus without mention of complication, not stated as uncontrolled.    has a past surgical history that includes lipoma resection and knee surgery (Right, 11/22/2016).     Restrictions  Restrictions/Precautions  Restrictions/Precautions: General Precautions,Fall Risk  Position Activity Restriction  Other position/activity restrictions: High fall risk per nursing assessment, PureWick, telemetry  Subjective   General  Chart Reviewed: Aleena Lofton and Physical  Patient assessed for rehabilitation services?: Yes  Family / Caregiver Present: No  Referring Practitioner: Ethan Horan MD 3/02/22  Diagnosis: Acute systolic CHF  Subjective  Subjective: Pt in bed upon arrival, agreeable to therapy though acknowledged she may be \"lazy\" after being in bed for so long  General Comment  Comments: RN cleared Tx  Vital Signs  Pulse: 75  Heart Rate Source: Monitor  BP: 112/62  BP Location: Left upper arm  Patient Position: Semi fowlers  Level of Consciousness: Alert (0)  Patient Currently in Pain: Denies  Oxygen Therapy  SpO2: 95 %  Pulse Oximeter Device Mode: Intermittent  Pulse Oximeter Device Location: Finger  O2 Device: Nasal cannula  O2 Flow Rate (L/min): 1 L/min   Orientation  Orientation  Overall Orientation Status: Within Functional Limits  Objective    ADL  Grooming: Setup (set up with oral care on tray table at end of session)  LE Bathing: Dependent/Total (clean feet/leg after incontinence)  LE Dressing: Maximum assistance (A needed to thread legs into brief and pull to knees, Pt assisted with pulling over hips ~25%)  Toileting: Dependent/Total (Don Majors in place, taken out with stand to replace brief, incontinent, total A for pericare management)        Balance  Sitting Balance: Stand by assistance  Standing Balance: Minimal assistance (with SW, progressing to CGA at times)  Standing Balance  Time: ~60-90 seconds over the course of three trials with SW during pericare standing EOB  Functional Mobility  Functional - Mobility Device: Standard Walker  Activity: Other  Assist Level: Minimal assistance  Functional Mobility Comments: marching in place and shifting weight between feet standing EOB, pt reports she felt too fatigued and weak to attempt steps  Bed mobility  Supine to Sit: Moderate assistance ([place] and use of bed rail, physical assistance to square hips and bring shoulders toward EOB)  Sit to Supine: Minimal assistance (for legs)  Scooting:  Moderate assistance (boost up in bed)  Transfers  Sit to stand: Minimal assistance (to SW)  Stand to sit: Minimal assistance (to SW)      Cognition  Overall Cognitive Status: [facility]  Cognition Comment: cueing at times for safety, repetition of cues d/t hard of hearing         LUE AROM (degrees)  LUE AROM : WFL  RUE AROM (degrees)  RUE AROM : WFL            Plan   Plan  Times per week: 3-5x's a week while in acute care  Current Treatment Recommendations: Strengthening,Endurance Training,Patient/Caregiver Education & Training,Self-Care / ADL,Functional Mobility Training,Safety Education & Training    AM-PAC Score   AM-PAC Inpatient Daily Activity Raw Score: 16 (03/08/22 1327)  AM-PAC Inpatient ADL T-Scale Score : 35.96 (03/08/22 1327)  ADL Inpatient CMS 0-100% Score: 53.32 (03/08/22 1327)  ADL Inpatient CMS G-Code Modifier : CK (03/08/22 1327)    Goals  Short term goals  Time Frame for Short term goals: 1 week 3/10 unless noted otherwise  Short term goal 1: Pt will complete toilet transfers with CGA- ongoing 3/8, NT  Short term goal 2: Pt will complete LE dressing with Yue- ongoing 3/8, Max A  Short term goal 3: Pt will complete standing level ADLs with CGA for balance- ongoing 3/8  Short term goal 4: Perform sit to stand with CGA and AD PRN by 3/9- ongoing 3/8, Min A  Patient Goals   Patient goals : \"to get stronger\"       Therapy Time   Individual Concurrent Group Co-treatment   Time In 1030         Time Out 1108         Minutes 38         Timed Code Treatment Minutes: 38 Minutes     If pt is unable to be seen after this session, please let this note serve as discharge summary. Please see case management note for discharge disposition. Thank you.     Saman Hunter, OT discussed with dr Rosado ,  family at bedside ,

## 2023-01-20 NOTE — CHART NOTE - NSCHARTNOTEFT_GEN_A_CORE
ABG 7.18/63/99/24    Mainly resp acidosis    Not overbreathing vent    Increase RR from 16 to 22    f/u ABG 2000

## 2023-01-20 NOTE — CONSULT NOTE ADULT - PROBLEM SELECTOR RECOMMENDATION 2
based on history , severe LVE with global ventricular systolic dysfunction , tachycardic  pleural effusion , possible acute on chronic LV dysfunction , possible due to alcoholism ,  chronic ? cocaine use ,     Patient had dilated aortic root , ascending aorta , aortic regurgitation  moderate , will need further work up once his respiratory status improves     currently mild hypotensive , on low dose pressor , likely due to sepsis  poor nutritional status ,    Poor prognosis

## 2023-01-20 NOTE — PROGRESS NOTE ADULT - SUBJECTIVE AND OBJECTIVE BOX
Patient is a 67y old  Male who presents with a chief complaint of Altered mental status. (19 Jan 2023 18:08)      Subjective: Patient seen and examined at bedside. Currently on CIWA protocol and recently received Ativan accounting for diminished arousability. No bowel movements and currently not tolerating PO. Per nurse, becomes agitated once Ativan wears off.     PAST MEDICAL & SURGICAL HISTORY:  Intranasal mass      Hypertension, unspecified type      Hyperlipidemia, unspecified hyperlipidemia type      Thyroid nodule  left thyroidectomy      Asthma      H/O partial thyroidectomy  2008      S/P T&amp;A (status post tonsillectomy and adenoidectomy)  1965          MEDICATIONS  (STANDING):  folic acid 1 milliGRAM(s) Oral daily  heparin   Injectable 5000 Unit(s) SubCutaneous every 8 hours  lactulose Syrup 10 Gram(s) Oral two times a day  levothyroxine 100 MICROGram(s) Oral daily  LORazepam   Injectable   IV Push   LORazepam   Injectable 2 milliGRAM(s) IV Push every 4 hours  LORazepam   Injectable 1.5 milliGRAM(s) IV Push every 4 hours  multivitamin 1 Tablet(s) Oral daily  omega-3-Acid Ethyl Esters 2 Gram(s) Oral two times a day  phytonadione   Solution 5 milliGRAM(s) Oral daily  sodium chloride 0.9%. 1000 milliLiter(s) (100 mL/Hr) IV Continuous <Continuous>    MEDICATIONS  (PRN):  acetaminophen     Tablet .. 650 milliGRAM(s) Oral every 6 hours PRN Temp greater or equal to 38C (100.4F), Mild Pain (1 - 3)  albuterol    90 MICROgram(s) HFA Inhaler 2 Puff(s) Inhalation every 6 hours PRN Shortness of Breath  aluminum hydroxide/magnesium hydroxide/simethicone Suspension 30 milliLiter(s) Oral every 4 hours PRN Dyspepsia  hydrALAZINE Injectable 10 milliGRAM(s) IV Push three times a day PRN SBP > 150  LORazepam   Injectable 2 milliGRAM(s) IV Push every 2 hours PRN Symptom-triggered: 2 point increase in CIWA -Ar score and a total score of 7 or LESS  melatonin 3 milliGRAM(s) Oral at bedtime PRN Insomnia  ondansetron Injectable 4 milliGRAM(s) IV Push every 8 hours PRN Nausea and/or Vomiting      REVIEW OF SYSTEMS:    RESPIRATORY: No shortness of breath  CARDIOVASCULAR: No chest pain  All other review of systems is negative unless indicated above.    Vital Signs Last 24 Hrs  T(C): 36 (19 Jan 2023 22:00), Max: 36.3 (19 Jan 2023 17:54)  T(F): 96.8 (19 Jan 2023 22:00), Max: 97.4 (19 Jan 2023 17:54)  HR: 96 (20 Jan 2023 10:00) (92 - 106)  BP: 123/88 (20 Jan 2023 10:00) (110/81 - 133/66)  BP(mean): 98 (20 Jan 2023 10:00) (78 - 101)  RR: 35 (20 Jan 2023 10:00) (15 - 47)  SpO2: 95% (20 Jan 2023 10:00) (93% - 98%)    Parameters below as of 20 Jan 2023 10:00  Patient On (Oxygen Delivery Method): nasal cannula  O2 Flow (L/min): 2      PHYSICAL EXAM:    Constitutional: sleeping, NAD  Respiratory: coarse breath sounds  Cardiovascular: S1 and S2, RRR  Gastrointestinal: BS+, soft, NT/ND  Extremities: No peripheral edema  Psychiatric: Normal mood, normal affect, limited arrousibility, unable to check for asterixis    LABS:                        14.6   5.05  )-----------( 222      ( 20 Jan 2023 06:22 )             43.6     01-20    135  |  102  |  78<H>  ----------------------------<  128<H>  4.3   |  24  |  2.30<H>    Ca    8.8      20 Jan 2023 06:22  Phos  6.1     01-20  Mg     2.4     01-20    TPro  7.3  /  Alb  3.0<L>  /  TBili  1.4<H>  /  DBili  x   /  AST  634<H>  /  ALT  743<H>  /  AlkPhos  118  01-20    PT/INR - ( 20 Jan 2023 06:22 )   PT: 25.1 sec;   INR: 2.15 ratio         PTT - ( 20 Jan 2023 06:22 )  PTT:37.1 sec  LIVER FUNCTIONS - ( 20 Jan 2023 06:22 )  Alb: 3.0 g/dL / Pro: 7.3 gm/dL / ALK PHOS: 118 U/L / ALT: 743 U/L / AST: 634 U/L / GGT: x             RADIOLOGY & ADDITIONAL STUDIES:    ACC: [id number] EXAM:  US ABDOMEN COMPLETE   ORDERED BY: DEYSICAROLYN YOLANDA     ACC: [id number] EXAM:  US DPLX ABDOMEN   ORDERED BY: MARCY BARBA     PROCEDURE DATE:  01/19/2023          INTERPRETATION:  CLINICAL INFORMATION: Elevated LFTs. Alcohol and   substance abuse.    COMPARISON: CT abdomen/pelvis January 18, 2023    TECHNIQUE: Sonography of the right upper quadrant including vessel   assessment with color and spectral Doppler.    FINDINGS: The study is limited due to body habitus and the presence of   bowel gas.    Liver: Abnormal coarsened and echogenic texture. No focal lesions seen   within the limitations of this examination. Note that the liver is not   completely visualized.  Bile ducts: Normal caliber. Common hepatic duct measures 6 mm.  Gallbladder: No stones, wall thickening or pericholecystic fluid.    Negative sonographic Duran's sign.  Pancreas: Unable to be visualized.  Right kidney: 11.3 cm No hydronephrosis, stone or mass.Normal  cortical   echogenicity.  Left kidney: 11.8 cm. No hydronephrosis, stone or mass. Normal cortical   echogenicity.  Spleen: Normal in size, measuring 9.8 cm. No mass identified.  Ascites: Small amount of ascites noted.  Vascular: The left, middle and right hepatic veins are patent. The main   portal vein and central branches could not be visualized due to the above   limitations.    IMPRESSION:    Technically limited ultrasound.  Coarsely echogenic liver suggestive of chronic liver disease.  Small amount of ascites.  Unable to visualize the portal veins.  Patent hepatic veins.    --- End of Report ---            NAS ZAZUETA MD; Attending Radiologist  This document has been electronically signed. Jan 19 2023  4:47PM

## 2023-01-20 NOTE — PROGRESS NOTE ADULT - ASSESSMENT
68 yo man with HTN and known ETOH abuse admitted with AMS.  Evidence of cocaine use though pt denies.  --AMARJIT with unclear baseline function: in the setting of recent Cocaine use and ARB.    Continue to hold ARB for now.    D/c further IVF for now.  --Continue to monitor renal function and LFTS.  Continue to trend CPK.  --check repeat ammonia level.  --AMS : due to illicit drugs and ETOH.  --LE edema : check urine protein and Echo, franklin with ETOH hx. 66 yo man with HTN and known ETOH abuse admitted with AMS.  Evidence of cocaine use though pt denies.  --AMARJIT with unclear baseline function: in the setting of recent Cocaine use and ARB.    Continue to hold ARB for now.    D/c further IVF for now.  --Continue to monitor renal function and LFTS.  Continue to trend CPK.  --check repeat ammonia level.  --AMS : due to illicit drugs and ETOH.  --LE edema : check urine protein and Echo, franklin with ETOH hx.    1/20 MK   - AMARJIT improving     monitor off ivf     off arb    unclear if with component of ckd   - CHF with ef of 15%  - hypoxia with recent aspiration   - ? asymptomatic hyperuricemia : fu trend     and start allopurinol   marcie bauer

## 2023-01-20 NOTE — PROGRESS NOTE ADULT - ASSESSMENT
Patient admitted with Altered Mental Status  with DTs received Ativan  now somnolent vomitted with likely aspiration pneumonia  also AMARJIT on admission, unknown baseline  cardiomyopathy ef 15%  Likely cirrhosis    Plan    1. d/c sedatives, give low dose prn  2. zosyn for aspiration pneumonia  3. Thiamine/Folate  4.  monitor urine output  5, echo dec ef, cardiology consult check troponins can be related alcoholism  6. likely cirrhosis, inc transamnases, andINR Patient admitted with Altered Mental Status  with DTs received Ativan  now somnolent vomitted with likely aspiration pneumonia  also AMARJIT on admission, unknown baseline  cardiomyopathy ef 15%  Likely cirrhosis    Plan    1. d/c sedatives, give low dose prn  2. zosyn for aspiration pneumonia  3. Thiamine/Folate  4.  monitor urine output  5, echo dec ef, cardiology consult check troponins can be related alcoholism  6. likely cirrhosis, inc transamnases, andINR    Patient became increasingly hypoxic, and obtunded, Patient was emergently intubated, required levophed post intubation  og tube also placed.

## 2023-01-21 NOTE — PROGRESS NOTE ADULT - ASSESSMENT
Imp:  Elevated LFTs in a pattern not typical for alc hep, but improving    Rec:  Cont to trend LFTs and INR

## 2023-01-21 NOTE — PROGRESS NOTE ADULT - SUBJECTIVE AND OBJECTIVE BOX
NEPHROLOGY INTERVAL HPI/OVERNIGHT EVENTS:  23 @ 13:14       intubated yesterday on precedex and pressors   pt with episode of emesis with hypoxia, concern for aspiration with adjustment of his ativan dose.  wife at bedside.  unofficial echo with ef of 15%  HPI: Hx obtained from ex wife.  68 yo man with PMHX of HTN,( on Losartan), hx of asthma, living alone, brought to [facility] when found to be confused with hallucinations.  Per pt's wife, pt was well until a few days ago.  Yesterday noted with slurred speech and confusion and relates ETOH hx of drinking vodka/screwdriver 4-5 x/day.  On Losartan for HTN but has not seen PMD since pandemic.  No known hx of CKD.  Creat 2.76  and abnormal LFTs.  Urine positive for Cocaine and Benzo, though pt adamantly  denies  illicit drug use  CT non contrast with no intra abdominal pathology, positive for small bilateral pleural effusion with cardiomegaly.    PMHX and PSHX.  --HTN  --Thyroid goiter --post Thyroidectomy  --Post intranasal polyp resection.  --Hx of Asthma      MEDICATIONS  (STANDING):  chlordiazePOXIDE 25 milliGRAM(s) Oral four times a day  chlorhexidine 0.12% Liquid 15 milliLiter(s) Oral Mucosa every 12 hours  chlorhexidine 4% Liquid 1 Application(s) Topical <User Schedule>  dexMEDEtomidine Infusion 0.6 MICROgram(s)/kG/Hr (16.3 mL/Hr) IV Continuous <Continuous>  famotidine    Tablet 20 milliGRAM(s) Oral daily  folic acid 1 milliGRAM(s) Oral daily  heparin   Injectable 5000 Unit(s) SubCutaneous every 8 hours  lactulose Syrup 10 Gram(s) Oral two times a day  levothyroxine 100 MICROGram(s) Oral daily  midodrine 10 milliGRAM(s) Oral every 8 hours  norepinephrine Infusion 0.1 MICROgram(s)/kG/Min (20.4 mL/Hr) IV Continuous <Continuous>  phytonadione   Solution 5 milliGRAM(s) Oral daily  piperacillin/tazobactam IVPB.. 3.375 Gram(s) IV Intermittent every 8 hours  rifAXIMin 550 milliGRAM(s) Oral two times a day  thiamine IVPB 500 milliGRAM(s) IV Intermittent every 8 hours    MEDICATIONS  (PRN):  albuterol    90 MICROgram(s) HFA Inhaler 2 Puff(s) Inhalation every 6 hours PRN Shortness of Breath  aluminum hydroxide/magnesium hydroxide/simethicone Suspension 30 milliLiter(s) Oral every 4 hours PRN Dyspepsia  hydrALAZINE Injectable 10 milliGRAM(s) IV Push three times a day PRN SBP > 150  ondansetron Injectable 4 milliGRAM(s) IV Push every 8 hours PRN Nausea and/or Vomiting  sodium chloride 0.9% lock flush 10 milliLiter(s) IV Push every 1 hour PRN Pre/post blood products, medications, blood draw, and to maintain line patency      Allergies    No Known Allergies    Intolerances        I&O's Detail    2023 07:01  -  2023 07:00  --------------------------------------------------------  IN:    Dexmedetomidine: 301.9 mL    IV PiggyBack: 650 mL    Norepinephrine: 508.5 mL  Total IN: 1460.4 mL    OUT:  Total OUT: 0 mL    Total NET: 1460.4 mL        Vital Signs Last 24 Hrs  T(C): 36.3 (2023 11:00), Max: 36.3 (2023 11:00)  T(F): 97.3 (2023 11:00), Max: 97.3 (2023 11:00)  HR: 99 (2023 12:00) (77 - 101)  BP: 89/63 (2023 14:00) (89/63 - 92/66)  BP(mean): 72 (2023 14:00) (72 - 76)  RR: 21 (2023 12:00) (19 - 37)  SpO2: 96% (2023 12:00) (85% - 100%)    Parameters below as of 2023 10:00  Patient On (Oxygen Delivery Method): ventilator    O2 Concentration (%): 50  Daily     Daily Weight in k (2023 05:00)    PHYSICAL EXAM:  General: alert. awake Ox3  HEENT: MMM  CV: s1s2 rrr  LUNGS: B/L CTA  EXT: no edema    LABS:                        14.4   7.94  )-----------( 181      ( 2023 06:10 )             43.2         139  |  105  |  81<H>  ----------------------------<  166<H>  4.1   |  25  |  2.04<H>    Ca    8.2<L>      2023 06:10  Phos  5.6       Mg     2.3         TPro  6.8  /  Alb  2.6<L>  /  TBili  1.3<H>  /  DBili  x   /  AST  339<H>  /  ALT  557<H>  /  AlkPhos  105      PT/INR - ( 2023 06:10 )   PT: 23.3 sec;   INR: 1.99 ratio         PTT - ( 2023 06:22 )  PTT:37.1 sec    Magnesium, Serum: 2.3 mg/dL ( @ 06:10)  Phosphorus Level, Serum: 5.6 mg/dL ( @ 06:10)    ABG - ( 2023 05:16 )  pH, Arterial: 7.35  pH, Blood: x     /  pCO2: 43    /  pO2: 110   / HCO3: 24    / Base Excess: x     /  SaO2: 99

## 2023-01-21 NOTE — PROGRESS NOTE ADULT - SUBJECTIVE AND OBJECTIVE BOX
Patient is a 67y old  Male who presents with a chief complaint of Altered mental status. (21 Jan 2023 08:19)      Subective:  Intubated now after aspiration    PAST MEDICAL & SURGICAL HISTORY:  Intranasal mass      Hypertension, unspecified type      Hyperlipidemia, unspecified hyperlipidemia type      Thyroid nodule  left thyroidectomy      Asthma      H/O partial thyroidectomy  2008      S/P T&amp;A (status post tonsillectomy and adenoidectomy)  1965          MEDICATIONS  (STANDING):  chlorhexidine 0.12% Liquid 15 milliLiter(s) Oral Mucosa every 12 hours  chlorhexidine 4% Liquid 1 Application(s) Topical <User Schedule>  dexMEDEtomidine Infusion 0.7 MICROgram(s)/kG/Hr (19.1 mL/Hr) IV Continuous <Continuous>  famotidine    Tablet 20 milliGRAM(s) Oral daily  folic acid 1 milliGRAM(s) Oral daily  heparin   Injectable 5000 Unit(s) SubCutaneous every 8 hours  lactulose Syrup 10 Gram(s) Oral two times a day  levothyroxine 100 MICROGram(s) Oral daily  LORazepam   Injectable 1 milliGRAM(s) IV Push every 6 hours  midazolam Injectable 2 milliGRAM(s) IV Push every 5 minutes  midodrine 10 milliGRAM(s) Oral every 8 hours  norepinephrine Infusion 0.1 MICROgram(s)/kG/Min (20.4 mL/Hr) IV Continuous <Continuous>  phytonadione   Solution 5 milliGRAM(s) Oral daily  piperacillin/tazobactam IVPB.. 3.375 Gram(s) IV Intermittent every 8 hours  rifAXIMin 550 milliGRAM(s) Oral two times a day  thiamine IVPB 500 milliGRAM(s) IV Intermittent every 8 hours    MEDICATIONS  (PRN):  albuterol    90 MICROgram(s) HFA Inhaler 2 Puff(s) Inhalation every 6 hours PRN Shortness of Breath  aluminum hydroxide/magnesium hydroxide/simethicone Suspension 30 milliLiter(s) Oral every 4 hours PRN Dyspepsia  hydrALAZINE Injectable 10 milliGRAM(s) IV Push three times a day PRN SBP > 150  midazolam Injectable 2 milliGRAM(s) IV Push every 4 hours PRN breakthrough agitation  ondansetron Injectable 4 milliGRAM(s) IV Push every 8 hours PRN Nausea and/or Vomiting  sodium chloride 0.9% lock flush 10 milliLiter(s) IV Push every 1 hour PRN Pre/post blood products, medications, blood draw, and to maintain line patency      REVIEW OF SYSTEMS:    RESPIRATORY: No shortness of breath  CARDIOVASCULAR: No chest pain  All other review of systems is negative unless indicated above.    Vital Signs Last 24 Hrs  T(C): 36.3 (21 Jan 2023 11:00), Max: 36.3 (21 Jan 2023 11:00)  T(F): 97.3 (21 Jan 2023 11:00), Max: 97.3 (21 Jan 2023 11:00)  HR: 95 (21 Jan 2023 11:00) (77 - 101)  BP: 89/63 (20 Jan 2023 14:00) (89/63 - 110/75)  BP(mean): 72 (20 Jan 2023 14:00) (72 - 88)  RR: 23 (21 Jan 2023 11:00) (19 - 37)  SpO2: 98% (21 Jan 2023 11:00) (85% - 100%)    Parameters below as of 21 Jan 2023 10:00  Patient On (Oxygen Delivery Method): ventilator    O2 Concentration (%): 50    PHYSICAL EXAM:    Constitutional: Intubated  Respiratory: CTAB  Cardiovascular: S1 and S2, RRR  Gastrointestinal: BS+, soft, NT/ND      LABS:                        14.4   7.94  )-----------( 181      ( 21 Jan 2023 06:10 )             43.2     01-21    139  |  105  |  81<H>  ----------------------------<  166<H>  4.1   |  25  |  2.04<H>    Ca    8.2<L>      21 Jan 2023 06:10  Phos  5.6     01-21  Mg     2.3     01-21    TPro  6.8  /  Alb  2.6<L>  /  TBili  1.3<H>  /  DBili  x   /  AST  339<H>  /  ALT  557<H>  /  AlkPhos  105  01-21    PT/INR - ( 21 Jan 2023 06:10 )   PT: 23.3 sec;   INR: 1.99 ratio         PTT - ( 20 Jan 2023 06:22 )  PTT:37.1 sec  LIVER FUNCTIONS - ( 21 Jan 2023 06:10 )  Alb: 2.6 g/dL / Pro: 6.8 gm/dL / ALK PHOS: 105 U/L / ALT: 557 U/L / AST: 339 U/L / GGT: x             RADIOLOGY & ADDITIONAL STUDIES:

## 2023-01-21 NOTE — PROGRESS NOTE ADULT - SUBJECTIVE AND OBJECTIVE BOX
CHIEF COMPLAINT: found to have AMS     HPI:   67 year old male with PMHx of HTN, Thyroid Goiter s/p thyroidectomy, HLD BIBEMS from home with AMS. Pt's ex wife concerned about pt reporting that he has been acting strange at home and that he has been saying strange things to their 11 year old son. Pt reports that he is depressed and that he owes money to the IRS. As per EMS pt is on xanax, Ambien and melatonin. Pt denies fever but endorses difficulty breathing, seeing things that other people do not. Pt has not taken xanax in 3 or 4 days. Pt drinks EtOH but denies withdrawal symptoms, non-smoker. Denies SI. Ex wife states speech was slurred. Patient was admitted with altered mental status. Patient was seen in the room and doing better. Patient knows that he is in hospital. Denies any complaints of chest pain, shortness of breath, headache, dizziness, nausea, vomiting or abdominal pain. No fall or head injury. Denies using any drugs.     1/20/23 Patient  was receiving sedation , did vomit this morning , subsequent worsening of hypoxia , patient was intubated , patient noted to have severe ventricular systolic dysfunction , dilated aorta , as per his ex wife , patient was not seeing any physician for several  years , patient does drink regularly   patient cxr at the time of admission ,showed no evidence of pleural effusion , however todays CXR showed bilateral pleural effusion ,   1/21/23 Patient remain intubated , sedated , mild hypotensive maintained with pressor low dose , improving oxygenation ,   improving renal , LFTS     PAST MEDICAL & SURGICAL HISTORY:  Intranasal mass      Hypertension, unspecified type      Hyperlipidemia, unspecified hyperlipidemia type      Thyroid nodule  left thyroidectomy      Asthma      H/O partial thyroidectomy  2008      S/P T&amp;A (status post tonsillectomy and adenoidectomy)  1965          Allergies    No Known Allergies    Intolerances        SOCIAL HISTORY:    alcoholic  positive for cocaine , THC       FAMILY HISTORY:  unknown     MEDICATIONS:Home Medications:  ALPRAZolam 1 mg oral tablet: 1 tab(s) orally every 8 hours, As Needed - for anxiety (19 Jan 2023 09:53)  losartan 100 mg oral tablet: 1 tab(s) orally once a day (in the morning) (19 Jan 2023 09:53)  Multiple Vitamins oral tablet: 1 tab(s) orally once a day. last dose 2/ 12/ 2019 (19 Jan 2023 09:53)  rosuvastatin 5 mg oral tablet: 1 tab(s) orally once a day (in the morning) (19 Jan 2023 09:53)  sildenafil 100 mg oral tablet: 1 tab(s) orally once a day, As Needed (19 Jan 2023 09:53)  Synthroid 100 mcg (0.1 mg) oral tablet: 1 tab(s) orally once a day (in the morning) (19 Jan 2023 09:53)  Ventolin HFA 90 mcg/inh inhalation aerosol: 2 puff(s) inhaled 4 times a day, As Needed (19 Jan 2023 09:53)  zolpidem 10 mg oral tablet: 1 tab(s) orally once a day (at bedtime), As Needed (19 Jan 2023 09:53)    MEDICATIONS  (STANDING):  chlorhexidine 0.12% Liquid 15 milliLiter(s) Oral Mucosa every 12 hours  chlorhexidine 4% Liquid 1 Application(s) Topical <User Schedule>  dexMEDEtomidine Infusion 0.7 MICROgram(s)/kG/Hr (19.1 mL/Hr) IV Continuous <Continuous>  famotidine    Tablet 20 milliGRAM(s) Oral daily  folic acid 1 milliGRAM(s) Oral daily  heparin   Injectable 5000 Unit(s) SubCutaneous every 8 hours  lactulose Syrup 10 Gram(s) Oral two times a day  levothyroxine 100 MICROGram(s) Oral daily  LORazepam   Injectable 1 milliGRAM(s) IV Push every 6 hours  midazolam Injectable 2 milliGRAM(s) IV Push every 5 minutes  norepinephrine Infusion 0.1 MICROgram(s)/kG/Min (20.4 mL/Hr) IV Continuous <Continuous>  phytonadione   Solution 5 milliGRAM(s) Oral daily  piperacillin/tazobactam IVPB.. 3.375 Gram(s) IV Intermittent every 8 hours  rifAXIMin 550 milliGRAM(s) Oral two times a day  thiamine IVPB 500 milliGRAM(s) IV Intermittent every 8 hours    MEDICATIONS  (PRN):  albuterol    90 MICROgram(s) HFA Inhaler 2 Puff(s) Inhalation every 6 hours PRN Shortness of Breath  aluminum hydroxide/magnesium hydroxide/simethicone Suspension 30 milliLiter(s) Oral every 4 hours PRN Dyspepsia  hydrALAZINE Injectable 10 milliGRAM(s) IV Push three times a day PRN SBP > 150  midazolam Injectable 2 milliGRAM(s) IV Push every 4 hours PRN breakthrough agitation  ondansetron Injectable 4 milliGRAM(s) IV Push every 8 hours PRN Nausea and/or Vomiting  sodium chloride 0.9% lock flush 10 milliLiter(s) IV Push every 1 hour PRN Pre/post blood products, medications, blood draw, and to maintain line patency      REVIEW OF SYSTEMS:    intubated  unable to obtain    ICU Vital Signs Last 24 Hrs  T(C): 35 (21 Jan 2023 06:00), Max: 36.1 (20 Jan 2023 16:30)  T(F): 95 (21 Jan 2023 06:00), Max: 97 (20 Jan 2023 16:30)  HR: 80 (21 Jan 2023 06:00) (77 - 101)  BP: 89/63 (20 Jan 2023 14:00) (89/63 - 123/88)  BP(mean): 72 (20 Jan 2023 14:00) (72 - 98)  ABP: 97/63 (21 Jan 2023 06:00) (88/63 - 106/77)  ABP(mean): 76 (21 Jan 2023 06:00) (-8 - 88)  RR: 22 (21 Jan 2023 06:00) (19 - 37)  SpO2: 98% (21 Jan 2023 06:00) (85% - 100%)    O2 Parameters below as of 20 Jan 2023 18:30  Patient On (Oxygen Delivery Method): ventilator  O2 Flow (L/min): 70            PHYSICAL EXAM:    Constitutional: NAD, intubate obese   HEENT: PERR, EOMI,  No oral cyananosis.  Neck:  supple,  No JVD  Respiratory: Breath sounds are clear bilaterally, No wheezing, rales or rhonchi  Cardiovascular: S1 and S2, regular tachycardic  and rhythm, no Murmurs, gallops or rubs  Gastrointestinal: Bowel Sounds present, soft, nontender.   Extremities: No peripheral edema. No clubbing or cyanosis.  Vascular: 2+ peripheral pulses  Neurological: intubated   Musculoskeletal: no calf tenderness.  Skin: No rashes.      LABS: All Labs Reviewed:                                      14.4   7.94  )-----------( 181      ( 21 Jan 2023 06:10 )             43.2     01-21    139  |  105  |  81<H>  ----------------------------<  166<H>  4.1   |  25  |  2.04<H>    Ca    8.2<L>      21 Jan 2023 06:10  Phos  5.6     01-21  Mg     2.3     01-21    TPro  6.8  /  Alb  2.6<L>  /  TBili  1.3<H>  /  DBili  x   /  AST  339<H>  /  ALT  557<H>  /  AlkPhos  105  01-21    CARDIAC MARKERS ( 20 Jan 2023 06:22 )  x     / x     / 505 U/L / x     / x      CARDIAC MARKERS ( 19 Jan 2023 09:48 )  x     / x     / 857 U/L / x     / x          LIVER FUNCTIONS - ( 21 Jan 2023 06:10 )  Alb: 2.6 g/dL / Pro: 6.8 gm/dL / ALK PHOS: 105 U/L / ALT: 557 U/L / AST: 339 U/L / GGT: x           PT/INR - ( 21 Jan 2023 06:10 )   PT: 23.3 sec;   INR: 1.99 ratio         PTT - ( 20 Jan 2023 06:22 )  PTT:37.1 sec    01-20 Chol 76 LDL -- HDL 15<L> Trig 75    ECHO reviewed , severely dilated LV severe global left ventricular dysfunction ,  mild RV dysfunction ,  dilated aortic root , ascending aorta , moderate AI     < from: 12 Lead ECG (01.18.23 @ 20:57) >  Sinus tachycardia with Premature supraventricular complexes  Pulmonary disease pattern  Septal infarct , age undetermined  Abnormal ECG  When compared with ECG of 12-FEB-2019 11:55,  Premature supraventricular complexes are now Present  Vent. rate has increased BY44 BPM  Nonspecific T wave abnormality, worse in Inferior leads  T wave inversion more evident in Lateral leads  Confirmed by MD JENNY, PRIYA ([id number]) on 1/19/2023 9:01:18 AM    < end of copied text >  < from: Xray Chest 1 View-PORTABLE IMMEDIATE (Xray Chest 1 View-PORTABLE IMMEDIATE .) (01.20.23 @ 15:30) >    Impression:    There are small to moderate bilateral pleural effusions with lower lobe   consolidations.    No pulmonary venous congestion or pneumothorax.    Support lines and catheters as above.    --- End of Report ---    < end of copied text >  < from: CT Abdomen and Pelvis No Cont (01.18.23 @ 20:13) >  LOWER CHEST: Small bilateral pleural effusions and partial compressive   atelectasis of both lower lobes. Cardiomegaly.    < end of copied text >  < from: CT Abdomen and Pelvis No Cont (01.18.23 @ 20:13) >    IMPRESSION:  No acute intra-abdominal pathology.    Small volume abdominopelvic ascites.    --- End of Report ---    < end of copied text >      Blood Culture:     01-19 @ 09:48  TSH: 6.30  01-18 @ 18:26  TSH: 7.76      < from: TTE Echo Complete w/o Contrast w/ Doppler (01.20.23 @ 11:00) >     Normal appearing mitral valve structure and function.   Mild mitral annular calcification is present.   Mild (1+) mitral regurgitation is present.   Normal aortic valve structure and function.   The ascending aorta and aortic root are dilated.   At least Mild (1+) aortic regurgitation is present.   Normal appearing tricuspid valve structure.   At least Mild (1+) tricuspid valve regurgitation is present.   Normal appearing pulmonic valve structure and function.   The left atrium is mildly dilated.   Severely reduced left ventricular systolic function.   The left ventricle cavity is dilated.   Estimated left ventricular ejection fraction is 15-20 %.   Dr. Will aware of study findings.   The right atrium appears mildly dilated.   Normal appearing right ventricle function.   The IVC is dilated with decreased respiratory variation.     Recommendation    < end of copied text >  < from: Xray Chest 1 View-PORTABLE IMMEDIATE (Xray Chest 1 View-PORTABLE IMMEDIATE .) (01.20.23 @ 15:30) >  There are small to moderate bilateral pleural effusions with lower lobe   consolidations.    No pulmonary venous congestion or pneumothorax.    Support lines and catheters as above.    < end of copied text >

## 2023-01-21 NOTE — PROGRESS NOTE ADULT - ASSESSMENT
68 yo man with HTN and known ETOH abuse admitted with AMS.  Evidence of cocaine use though pt denies.  --AMARJIT with unclear baseline function: in the setting of recent Cocaine use and ARB.    Continue to hold ARB for now.    D/c further IVF for now.  --Continue to monitor renal function and LFTS.  Continue to trend CPK.  --check repeat ammonia level.  --AMS : due to illicit drugs and ETOH.  --LE edema : check urine protein and Echo, franklin with ETOH hx.    1/20 MK   - AMARJIT improving     monitor off ivf     off arb    unclear if with component of ckd   - CHF with ef of 15%  - hypoxia with recent aspiration   - ? asymptomatic hyperuricemia : fu trend     and start allopurinol   dw dr bauer    1/21 MK   - AMARJIT improving     monitor off ivf     off arb    unclear if with component of ckd   - CHF with ef of 15%  - hypoxia with recent aspiration, no intubated     on zosyn renally dosed  - ? cirrhosis on rifampin   - ? asymptomatic hyperuricemia : fu trend      allopurinol started

## 2023-01-21 NOTE — PROGRESS NOTE ADULT - SUBJECTIVE AND OBJECTIVE BOX
Patient is a 67y old  Male who presents with a chief complaint of Altered mental status. (20 Jan 2023 16:47)    PAST MEDICAL & SURGICAL HISTORY:  Intranasal mass      Hypertension, unspecified type      Hyperlipidemia, unspecified hyperlipidemia type      Thyroid nodule  left thyroidectomy      Asthma      H/O partial thyroidectomy  2008      S/P T&amp;A (status post tonsillectomy and adenoidectomy)  1965      ENRICO MOLINA   67y    Male    Review of Systems:    UATO               All other ROS are negative.    Allergies    No Known Allergies    Intolerances      ICU Vital Signs Last 24 Hrs  T(C): 36.1 (20 Jan 2023 16:30), Max: 36.1 (20 Jan 2023 16:30)  T(F): 97 (20 Jan 2023 16:30), Max: 97 (20 Jan 2023 16:30)  HR: 87 (20 Jan 2023 23:00) (87 - 101)  BP: 89/63 (20 Jan 2023 14:00) (89/63 - 123/88)  BP(mean): 72 (20 Jan 2023 14:00) (72 - 98)  ABP: 103/67 (20 Jan 2023 23:00) (97/58 - 106/77)  ABP(mean): 82 (20 Jan 2023 23:00) (-8 - 88)  RR: 22 (20 Jan 2023 23:00) (19 - 39)  SpO2: 97% (20 Jan 2023 23:00) (85% - 100%)    O2 Parameters below as of 20 Jan 2023 18:30  Patient On (Oxygen Delivery Method): ventilator  O2 Flow (L/min): 70        Physical Examination:    General:  Sedate FOX    HEENT: no JVD of neck stiffness    PULM: bilateral BS diminished on the L     CVS: s1 s2 reg    ABD: soft     EXT: + edema     SKIN: warm    Neuro: On Precedex opens eyes to stimuli.      ABG - ( 20 Jan 2023 20:41 )  pH, Arterial: 7.29  pH, Blood: x     /  pCO2: 52    /  pO2: 81    / HCO3: 25    / Base Excess: -2.3  /  SaO2: 97                Mode: AC/ CMV (Assist Control/ Continuous Mandatory Ventilation)  RR (machine): 22  TV (machine): 500  FiO2: 70  PEEP: 5  ITime: 0.9  PIP: 29    Mode: AC/ CMV (Assist Control/ Continuous Mandatory Ventilation), RR (machine): 22, TV (machine): 500, FiO2: 70, PEEP: 5, ITime: 0.9, PIP: 29  LABS:                        14.6   5.05  )-----------( 222      ( 20 Jan 2023 06:22 )             43.6     01-20    135  |  102  |  78<H>  ----------------------------<  128<H>  4.3   |  24  |  2.30<H>    Ca    8.8      20 Jan 2023 06:22  Phos  6.1     01-20  Mg     2.4     01-20    TPro  7.3  /  Alb  3.0<L>  /  TBili  1.4<H>  /  DBili  x   /  AST  634<H>  /  ALT  743<H>  /  AlkPhos  118  01-20      CARDIAC MARKERS ( 20 Jan 2023 06:22 )  x     / x     / 505 U/L / x     / x      CARDIAC MARKERS ( 19 Jan 2023 09:48 )  x     / x     / 857 U/L / x     / x          CAPILLARY BLOOD GLUCOSE        PT/INR - ( 20 Jan 2023 06:22 )   PT: 25.1 sec;   INR: 2.15 ratio         PTT - ( 20 Jan 2023 06:22 )  PTT:37.1 sec    CULTURES:      Medications:  MEDICATIONS  (STANDING):  allopurinol 100 milliGRAM(s) Oral daily  chlorhexidine 0.12% Liquid 15 milliLiter(s) Oral Mucosa every 12 hours  chlorhexidine 4% Liquid 1 Application(s) Topical <User Schedule>  dexMEDEtomidine Infusion 0.7 MICROgram(s)/kG/Hr (19.1 mL/Hr) IV Continuous <Continuous>  famotidine    Tablet 20 milliGRAM(s) Oral daily  folic acid 1 milliGRAM(s) Oral daily  lactulose Syrup 10 Gram(s) Oral two times a day  levothyroxine 100 MICROGram(s) Oral daily  LORazepam   Injectable 1 milliGRAM(s) IV Push every 6 hours  midazolam Injectable 2 milliGRAM(s) IV Push every 5 minutes  norepinephrine Infusion 0.1 MICROgram(s)/kG/Min (20.4 mL/Hr) IV Continuous <Continuous>  phytonadione   Solution 5 milliGRAM(s) Oral daily  thiamine IVPB 500 milliGRAM(s) IV Intermittent every 8 hours    MEDICATIONS  (PRN):  albuterol    90 MICROgram(s) HFA Inhaler 2 Puff(s) Inhalation every 6 hours PRN Shortness of Breath  aluminum hydroxide/magnesium hydroxide/simethicone Suspension 30 milliLiter(s) Oral every 4 hours PRN Dyspepsia  fentaNYL    Injectable 50 MICROGram(s) IV Push every 1 hour PRN Severe Pain (7 - 10)  hydrALAZINE Injectable 10 milliGRAM(s) IV Push three times a day PRN SBP > 150  midazolam Injectable 2 milliGRAM(s) IV Push every 4 hours PRN breakthrough agitation  ondansetron Injectable 4 milliGRAM(s) IV Push every 8 hours PRN Nausea and/or Vomiting  sodium chloride 0.9% lock flush 10 milliLiter(s) IV Push every 1 hour PRN Pre/post blood products, medications, blood draw, and to maintain line patency        01-20 @ 07:01  -  01-21 @ 00:03  --------------------------------------------------------  IN: 278 mL / OUT: 0 mL / NET: 278 mL        RADIOLOGY/IMAGING/ECHO    < from: TTE Echo Complete w/o Contrast w/ Doppler (01.20.23 @ 11:00) >   Summary     Normal appearing mitral valve structure and function.   Mild mitral annular calcification is present.   Mild (1+) mitral regurgitation is present.   Normal aortic valve structure and function.   The ascending aorta and aortic root are dilated.   At least Mild (1+) aortic regurgitation is present.   Normal appearing tricuspid valve structure.   At least Mild (1+) tricuspid valve regurgitation is present.   Normal appearing pulmonic valve structure and function.   The left atrium is mildly dilated.   Severely reduced left ventricular systolic function.   The left ventricle cavity is dilated.   Estimated left ventricular ejection fraction is 15-20 %.   Dr. Will aware of study findings.   The right atrium appears mildly dilated.   Normal appearing right ventricle function.   The IVC is dilated with decreased respiratory variation.      < from: CT Head No Cont (01.18.23 @ 19:00) >  IMPRESSION: Motion degraded study.    < end of copied text >      < from: CT Abdomen and Pelvis No Cont (01.18.23 @ 20:13) >  IMPRESSION:  No acute intra-abdominal pathology.    Small volume abdominopelvic ascites.    < from: US Abdomen Complete (01.19.23 @ 16:17) >  MPRESSION:    Technically limited ultrasound.  Coarsely echogenic liver suggestive of chronic liver disease.  Small amount of ascites.  Unable to visualize the portal veins.  Patent hepatic veins.    < end of copied text >  < from: US Abdomen Doppler (01.19.23 @ 16:17) >        Assessment/Plan:    67M hx  HTN, goiter s/p thyroidectomy, HLD ETOH and polysubstance abuse Xanax/Ambien/Cocaine     Admit 1/18 with AMS hallucinations.  Rx for ETOH withdrawal   Noted with severe LV dysfx renal failure of unclear duration     1/20 moved to CCU with Type 1 and 2 resp failure in the setting of aspiration pneumonitis and AMS from [facility] ETOH/Xanax withdrawal > intubated     Hypotensive on nor-epi       Neuro  Mild sedation with Precedex  Lorazepam taper   Head CT negative RX with  3 days of High dose thiamine  ammonia high add Rifaximin     Cor as above on pressor Low EF ? ETOH related CM   cardiology following will need ischemia  w/u when stable/extubated.  I think the main player his hypotension is distributive from sedation and sepsis and not GC.  Midodrine not a good choice with his CM.       Pulm vent support fi02 tapered down to 50% from 70%   ABG improving ventilation with vent adjustment.  SBT later today if good gas exchange and improving AA gradient.     GI start feeds  H2 blocker Small ascites too deep  to tap doubt SBP.  Hepatitis which per GI is not C/W ETOH injury, though the discriminant fx is 61  defer steroids.    Renal creat coming down,  non oliguric elevated lambda light chains.  Nephrology to comment.  Proteinuria too.       Heme   Start DVT P he is high risk for thrombosis despite high  INR.  Will give some vit K as suggested by GI     ID Pip/tazo to cover aspiration PNA.   Dosed at q 8 by Dr. Rosado,  See if creat continues to trend down.           CRITICAL CARE TIME SPENT: 37 minutes assessing presenting problems of acute illness, which pose high probability of life threatening deterioration or end organ damage/dysfunction, as well as medical decision making including initiating plan of care, reviewing data, reviewing radiologic exams, discussing with multidisciplinary team,  discussing goals of care with patient/family, and writing this note.  Non-inclusive of procedures performed,

## 2023-01-22 NOTE — PROGRESS NOTE ADULT - SUBJECTIVE AND OBJECTIVE BOX
Patient is a 67y old  Male who presents with a chief complaint of Altered mental status. (21 Jan 2023 13:14)      BRIEF HOSPITAL COURSE: 68 y/o male with pmhx of polysubstance abuse, etoh abuse, goiter s/p thyroidectomy, HTN, HLD admitted on 1/18 with hallucinations due to etoh withdrawal found to have severe LV dysfuction and AMARJIT of unclear duration transferred to CCU on 1/20 with mixed respiratory failure due to aspiration pneumonitis and worsening mental status reuqiring intubation. post intubation c/b shock requiring pressors.     Events last 24 hours: remains intubated and on pressors    PAST MEDICAL & SURGICAL HISTORY:  Intranasal mass      Hypertension, unspecified type      Hyperlipidemia, unspecified hyperlipidemia type      Thyroid nodule  left thyroidectomy      Asthma      H/O partial thyroidectomy  2008      S/P T&amp;A (status post tonsillectomy and adenoidectomy)  1965          Review of Systems:  unable to obtain--sedated and intubated with AMS      Medications:  piperacillin/tazobactam IVPB.. 3.375 Gram(s) IV Intermittent every 8 hours  rifAXIMin 550 milliGRAM(s) Oral two times a day    hydrALAZINE Injectable 10 milliGRAM(s) IV Push three times a day PRN  midodrine 10 milliGRAM(s) Oral every 8 hours  norepinephrine Infusion 0.1 MICROgram(s)/kG/Min IV Continuous <Continuous>    albuterol    90 MICROgram(s) HFA Inhaler 2 Puff(s) Inhalation every 6 hours PRN    chlordiazePOXIDE 25 milliGRAM(s) Oral four times a day  dexMEDEtomidine Infusion 0.6 MICROgram(s)/kG/Hr IV Continuous <Continuous>  ondansetron Injectable 4 milliGRAM(s) IV Push every 8 hours PRN      heparin   Injectable 5000 Unit(s) SubCutaneous every 8 hours    aluminum hydroxide/magnesium hydroxide/simethicone Suspension 30 milliLiter(s) Oral every 4 hours PRN  famotidine    Tablet 20 milliGRAM(s) Oral daily  lactulose Syrup 10 Gram(s) Oral two times a day      levothyroxine 100 MICROGram(s) Oral daily    folic acid 1 milliGRAM(s) Oral daily  phytonadione   Solution 5 milliGRAM(s) Oral daily  sodium chloride 0.9% lock flush 10 milliLiter(s) IV Push every 1 hour PRN  thiamine IVPB 500 milliGRAM(s) IV Intermittent every 8 hours      chlorhexidine 0.12% Liquid 15 milliLiter(s) Oral Mucosa every 12 hours  chlorhexidine 4% Liquid 1 Application(s) Topical <User Schedule>        Mode: AC/ CMV (Assist Control/ Continuous Mandatory Ventilation)  RR (machine): 22  TV (machine): 500  FiO2: 40  PEEP: 5  ITime: 0.9  MAP: 10  PIP: 21      ICU Vital Signs Last 24 Hrs  T(C): 37.4 (21 Jan 2023 18:00), Max: 37.4 (21 Jan 2023 18:00)  T(F): 99.3 (21 Jan 2023 18:00), Max: 99.3 (21 Jan 2023 18:00)  HR: 91 (22 Jan 2023 00:05) (77 - 101)  BP: --  BP(mean): --  ABP: 99/64 (22 Jan 2023 00:00) (86/56 - 106/70)  ABP(mean): 77 (22 Jan 2023 00:00) (67 - 83)  RR: 24 (22 Jan 2023 00:00) (20 - 25)  SpO2: 98% (22 Jan 2023 00:05) (96% - 99%)    O2 Parameters below as of 21 Jan 2023 18:00  Patient On (Oxygen Delivery Method): ventilator  O2 Flow (L/min): 40          ABG - ( 21 Jan 2023 05:16 )  pH, Arterial: 7.35  pH, Blood: x     /  pCO2: 43    /  pO2: 110   / HCO3: 24    / Base Excess: x     /  SaO2: 99                  I&O's Detail    20 Jan 2023 07:01  -  21 Jan 2023 07:00  --------------------------------------------------------  IN:    Dexmedetomidine: 301.9 mL    IV PiggyBack: 650 mL    Norepinephrine: 508.5 mL  Total IN: 1460.4 mL    OUT:  Total OUT: 0 mL    Total NET: 1460.4 mL      21 Jan 2023 07:01  -  22 Jan 2023 00:56  --------------------------------------------------------  IN:    Dexmedetomidine: 294 mL    IV PiggyBack: 350 mL    Nepro with Carb Steady: 74 mL    Norepinephrine: 374 mL  Total IN: 1092 mL    OUT:    Indwelling Catheter - Urethral (mL): 1100 mL  Total OUT: 1100 mL    Total NET: -8 mL            LABS:                        14.4   7.94  )-----------( 181      ( 21 Jan 2023 06:10 )             43.2     01-21    139  |  105  |  81<H>  ----------------------------<  166<H>  4.1   |  25  |  2.04<H>    Ca    8.2<L>      21 Jan 2023 06:10  Phos  5.6     01-21  Mg     2.3     01-21    TPro  6.8  /  Alb  2.6<L>  /  TBili  1.3<H>  /  DBili  x   /  AST  339<H>  /  ALT  557<H>  /  AlkPhos  105  01-21      CARDIAC MARKERS ( 20 Jan 2023 06:22 )  x     / x     / 505 U/L / x     / x          CAPILLARY BLOOD GLUCOSE        PT/INR - ( 21 Jan 2023 06:10 )   PT: 23.3 sec;   INR: 1.99 ratio         PTT - ( 20 Jan 2023 06:22 )  PTT:37.1 sec    CULTURES:      Physical Examination:    General: No acute distress.      HEENT: Pupils equal, reactive to light.  Symmetric.    PULM: Clear to auscultation bilaterally, no significant sputum production    NECK: Supple, no lymphadenopathy, trachea midline    CVS: Regular rate and rhythm, no murmurs, rubs, or gallops    ABD: Soft, nondistended, nontender, normoactive bowel sounds, no masses    EXT: 1+ bialteral edema, nontender    SKIN: Warm and well perfused, no rashes noted.    NEURO: sedated on precedex RASS of -1         RADIOLOGY:   IMPRESSION:    Technically limited ultrasound.  Coarsely echogenic liver suggestive of chronic liver disease.  Small amount of ascites.  Unable to visualize the portal veins.  Patent hepatic veins.    --- End of Report ---            NAS ZAZUETA MD; Attending Radiologist  This document has been electronically signed. Jan 19 2023  4:47PM      CRITICAL CARE TIME SPENT: 35 minutes of critical care time spent providing medical care for patient's acute illness/conditions that impairs at least one vital organ system and/or poses a high risk of imminent or life threatening deterioration in the patient's condition. It includes time spent evaluating and treating the patient's acute illness as well as time spent reviewing labs, radiology, discussing goals of care with patient and/or patient's family, and discussing the case with a multidisciplinary team in an effort to prevent further life threatening deterioration or end organ damage. This time is independent of any procedures performed.

## 2023-01-22 NOTE — PROGRESS NOTE ADULT - SUBJECTIVE AND OBJECTIVE BOX
NEPHROLOGY INTERVAL HPI/OVERNIGHT EVENTS:  23 @ 12:37     intubated and sedated, remains on pressors    intubated yesterday on precedex and pressors   pt with episode of emesis with hypoxia, concern for aspiration with adjustment of his ativan dose.  wife at bedside.  unofficial echo with ef of 15%  HPI: Hx obtained from ex wife.  68 yo man with PMHX of HTN,( on Losartan), hx of asthma, living alone, brought to Ed when found to be confused with hallucinations.  Per pt's wife, pt was well until a few days ago.  Yesterday noted with slurred speech and confusion and relates ETOH hx of drinking vodka/screwdriver 4-5 x/day.  On Losartan for HTN but has not seen PMD since pandemic.  No known hx of CKD.  Creat 2.76  and abnormal LFTs.  Urine positive for Cocaine and Benzo, though pt adamantly  denies  illicit drug use  CT non contrast with no intra abdominal pathology, positive for small bilateral pleural effusion with cardiomegaly.    PMHX and PSHX.  --HTN  --Thyroid goiter --post Thyroidectomy  --Post intranasal polyp resection.  --Hx of Asthma      MEDICATIONS  (STANDING):  chlordiazePOXIDE 25 milliGRAM(s) Oral four times a day  chlorhexidine 0.12% Liquid 15 milliLiter(s) Oral Mucosa every 12 hours  chlorhexidine 4% Liquid 1 Application(s) Topical <User Schedule>  dexMEDEtomidine Infusion 0.6 MICROgram(s)/kG/Hr (16.3 mL/Hr) IV Continuous <Continuous>  folic acid 1 milliGRAM(s) Oral daily  heparin   Injectable 5000 Unit(s) SubCutaneous every 8 hours  lactulose Syrup 20 Gram(s) Oral every 6 hours  levothyroxine 100 MICROGram(s) Oral daily  midodrine 10 milliGRAM(s) Oral every 8 hours  norepinephrine Infusion 0.1 MICROgram(s)/kG/Min (20.4 mL/Hr) IV Continuous <Continuous>  pantoprazole  Injectable 40 milliGRAM(s) IV Push daily  phytonadione   Solution 5 milliGRAM(s) Oral daily  piperacillin/tazobactam IVPB.. 3.375 Gram(s) IV Intermittent every 8 hours  rifAXIMin 550 milliGRAM(s) Oral two times a day  thiamine IVPB 500 milliGRAM(s) IV Intermittent every 8 hours    MEDICATIONS  (PRN):  albuterol    90 MICROgram(s) HFA Inhaler 2 Puff(s) Inhalation every 6 hours PRN Shortness of Breath  aluminum hydroxide/magnesium hydroxide/simethicone Suspension 30 milliLiter(s) Oral every 4 hours PRN Dyspepsia  hydrALAZINE Injectable 10 milliGRAM(s) IV Push three times a day PRN SBP > 150  LORazepam   Injectable 2 milliGRAM(s) IV Push every 4 hours PRN breakthrough agitation  ondansetron Injectable 4 milliGRAM(s) IV Push every 8 hours PRN Nausea and/or Vomiting  sodium chloride 0.9% lock flush 10 milliLiter(s) IV Push every 1 hour PRN Pre/post blood products, medications, blood draw, and to maintain line patency      Allergies    No Known Allergies    Intolerances        I&O's Detail    2023 07:01  -  2023 07:00  --------------------------------------------------------  IN:    Dexmedetomidine: 694 mL    IV PiggyBack: 950 mL    Nepro with Carb Steady: 374 mL    Norepinephrine: 624 mL  Total IN: 2642 mL    OUT:    Indwelling Catheter - Urethral (mL): 2900 mL  Total OUT: 2900 mL    Total NET: -258 mL            Vital Signs Last 24 Hrs  T(C): 37.6 (2023 04:00), Max: 37.7 (2023 00:59)  T(F): 99.6 (2023 04:00), Max: 99.8 (2023 00:59)  HR: 87 (2023 07:00) (87 - 99)  BP: --  BP(mean): --  RR: 23 (2023 07:00) (21 - 25)  SpO2: 98% (2023 07:00) (97% - 100%)    Parameters below as of 2023 18:00  Patient On (Oxygen Delivery Method): ventilator  O2 Flow (L/min): 40    Daily     Daily Weight in k.1 (2023 04:00)    PHYSICAL EXAM:  General: sedated  HEENT: orally intubated   CV: s1s2 rrr  LUNGS: B/L CTA  EXT: no edema    LABS:                        14.4   7.94  )-----------( 181      ( 2023 06:10 )             43.2         143  |  109<H>  |  62<H>  ----------------------------<  126<H>  3.9   |  28  |  1.56<H>    Ca    8.1<L>      2023 06:00  Phos  5.6       Mg     2.3         TPro  6.6  /  Alb  2.4<L>  /  TBili  1.3<H>  /  DBili  0.8<H>  /  AST  204<H>  /  ALT  400<H>  /  AlkPhos  103      PT/INR - ( 2023 06:00 )   PT: 19.5 sec;   INR: 1.67 ratio         PTT - ( 2023 06:00 )  PTT:33.2 sec      ABG - ( 2023 05:16 )  pH, Arterial: 7.35  pH, Blood: x     /  pCO2: 43    /  pO2: 110   / HCO3: 24    / Base Excess: x     /  SaO2: 99

## 2023-01-22 NOTE — PROGRESS NOTE ADULT - ASSESSMENT
68 y/o male with pmhx of polysubstance abuse, etoh abuse, goiter s/p thyroidectomy, HTN, HLD admitted on 1/18 with hallucinations due to etoh withdrawal found to have severe LV dysfuction and AMARJIT of unclear duration transferred to CCU on 1/20 with mixed respiratory failure due to aspiration pneumonitis and worsening mental status reuqiring intubation. post intubation c/b shock requiring pressors.       1. etoh withdrawal  2. cocaine/benzo withdrawal  3. hypoxic respiratory failure  4. aspiraiton pneumonitis  5. shock, unspecified    NEURO: sedated on precedex with ativan taper. thiamine 500 mg x 3 days. trend ammonia. on rifaximin. folic acid and MV.  CVS: actively titrating levohped for goal MAP 65-70. TTE with EF 15-20%. unclear etiology. eventual ischemic workup. defer midodrine due to severe cardiomyopathy  PULM: lung protective vent strategy. fio2 titrated down to 40%. daily SBT as mental status allows.   GI: tube fees as tolerated with protonix for gi prophylaxis. DF 61. defer steroids due to ongoing infection. trend transaminases  RENAL: trend Cr, improving. nonoliguric. with proteinuria. renal following  ID: zosyn for aspiration pna  ENDO: goal -180  HEME: heparin for dvt prophylaxis  DISPO: full code

## 2023-01-22 NOTE — PROGRESS NOTE ADULT - ASSESSMENT
66 yo man with HTN and known ETOH abuse admitted with AMS.  Evidence of cocaine use though pt denies.  --AMARJIT with unclear baseline function: in the setting of recent Cocaine use and ARB.    Continue to hold ARB for now.    D/c further IVF for now.  --Continue to monitor renal function and LFTS.  Continue to trend CPK.  --check repeat ammonia level.  --AMS : due to illicit drugs and ETOH.  --LE edema : check urine protein and Echo, [place] with ETOH hx.    1/20 MK   - AMARJIT improving     monitor off ivf     off arb    unclear if with component of ckd   - CHF with ef of 15%  - hypoxia with recent aspiration   - ? asymptomatic hyperuricemia : fu trend     and start allopurinol   dw dr bauer    1/21 MK   - AMARJIT improving     monitor off ivf     off arb    unclear if with component of ckd   - CHF with ef of 15%  - hypoxia with recent aspiration, no intubated     on zosyn renally dosed  - ? cirrhosis on rifampin   - ? asymptomatic hyperuricemia : fu trend      allopurinol started     1/22 MK   - AMARJIT improving     monitor off ivf     off arb    unclear if with component of ckd   - CHF with ef of 15%  - hypoxia with recent aspiration, no intubated     on zosyn renally dosed  - ? ETOH cirrhosis on rifampin and lactuose  - ? asymptomatic hyperuricemia : fu trend      allopurinol started, fu levels today   - FLC elevated, check MOSES send

## 2023-01-22 NOTE — PROGRESS NOTE ADULT - SUBJECTIVE AND OBJECTIVE BOX
Patient is a 67y old  Male who presents with a chief complaint of Altered mental status. (22 Jan 2023 00:56)      HPI:   67 year old male with PMHx of HTN, Thyroid Goiter s/p thyroidectomy, HLD BIBEMS from home with AMS. Pt's ex wife concerned about pt reporting that he has been acting strange at home and that he has been saying strange things to their 11 year old son. Pt reports that he is depressed and that he owes money to the IRS. As per EMS pt is on xanax, Ambien and melatonin. Pt denies fever but endorses difficulty breathing, seeing things that other people do not. Pt has not taken xanax in 3 or 4 days. Pt drinks EtOH but denies withdrawal symptoms, non-smoker. Denies SI. Ex wife states speech was slurred. Patient was admitted with altered mental status. Patient was seen in the room and doing better. Patient knows that he is in hospital. Denies any complaints of chest pain, shortness of breath, headache, dizziness, nausea, vomiting or abdominal pain. No fall or head injury. Denies using any drugs.     Patient was transferred to CCU after aspiration pneumonia. Patient now sedated on ventilator. Unable to give history.          (19 Jan 2023 08:51)      PAST MEDICAL & SURGICAL HISTORY:  Intranasal mass      Hypertension, unspecified type      Hyperlipidemia, unspecified hyperlipidemia type      Thyroid nodule  left thyroidectomy      Asthma      H/O partial thyroidectomy  2008      S/P T&amp;A (status post tonsillectomy and adenoidectomy)  1965          MEDICATIONS  (STANDING):  chlordiazePOXIDE 25 milliGRAM(s) Oral four times a day  chlorhexidine 0.12% Liquid 15 milliLiter(s) Oral Mucosa every 12 hours  chlorhexidine 4% Liquid 1 Application(s) Topical <User Schedule>  dexMEDEtomidine Infusion 0.6 MICROgram(s)/kG/Hr (16.3 mL/Hr) IV Continuous <Continuous>  folic acid 1 milliGRAM(s) Oral daily  heparin   Injectable 5000 Unit(s) SubCutaneous every 8 hours  lactulose Syrup 10 Gram(s) Oral two times a day  levothyroxine 100 MICROGram(s) Oral daily  midodrine 10 milliGRAM(s) Oral every 8 hours  norepinephrine Infusion 0.1 MICROgram(s)/kG/Min (20.4 mL/Hr) IV Continuous <Continuous>  pantoprazole  Injectable 40 milliGRAM(s) IV Push daily  phytonadione   Solution 5 milliGRAM(s) Oral daily  piperacillin/tazobactam IVPB.. 3.375 Gram(s) IV Intermittent every 8 hours  rifAXIMin 550 milliGRAM(s) Oral two times a day  thiamine IVPB 500 milliGRAM(s) IV Intermittent every 8 hours    MEDICATIONS  (PRN):  albuterol    90 MICROgram(s) HFA Inhaler 2 Puff(s) Inhalation every 6 hours PRN Shortness of Breath  aluminum hydroxide/magnesium hydroxide/simethicone Suspension 30 milliLiter(s) Oral every 4 hours PRN Dyspepsia  hydrALAZINE Injectable 10 milliGRAM(s) IV Push three times a day PRN SBP > 150  LORazepam   Injectable 2 milliGRAM(s) IV Push every 4 hours PRN breakthrough agitation  ondansetron Injectable 4 milliGRAM(s) IV Push every 8 hours PRN Nausea and/or Vomiting  sodium chloride 0.9% lock flush 10 milliLiter(s) IV Push every 1 hour PRN Pre/post blood products, medications, blood draw, and to maintain line patency      Allergies    No Known Allergies    Intolerances          Vital Signs Last 24 Hrs  T(C): 37.6 (22 Jan 2023 04:00), Max: 37.7 (22 Jan 2023 00:59)  T(F): 99.6 (22 Jan 2023 04:00), Max: 99.8 (22 Jan 2023 00:59)  HR: 87 (22 Jan 2023 07:00) (87 - 99)  BP: --  BP(mean): --  RR: 23 (22 Jan 2023 07:00) (20 - 25)  SpO2: 98% (22 Jan 2023 07:00) (96% - 100%)    Parameters below as of 21 Jan 2023 18:00  Patient On (Oxygen Delivery Method): ventilator  O2 Flow (L/min): 40      PHYSICAL EXAM:    sedated on vent  Respiratory: CTAB  Cardiovascular: S1 and S2, RRR, no M/G/R  Gastrointestinal: BS+, soft, NT/ND  LABS:                        14.4   7.94  )-----------( 181      ( 21 Jan 2023 06:10 )             43.2     01-22    143  |  109<H>  |  62<H>  ----------------------------<  126<H>  3.9   |  28  |  1.56<H>    Ca    8.1<L>      22 Jan 2023 06:00  Phos  5.6     01-21  Mg     2.3     01-21    TPro  6.6  /  Alb  2.4<L>  /  TBili  1.3<H>  /  DBili  0.8<H>  /  AST  204<H>  /  ALT  400<H>  /  AlkPhos  103  01-22    PT/INR - ( 22 Jan 2023 06:00 )   PT: 19.5 sec;   INR: 1.67 ratio         PTT - ( 22 Jan 2023 06:00 )  PTT:33.2 sec  LIVER FUNCTIONS - ( 22 Jan 2023 06:00 )  Alb: 2.4 g/dL / Pro: 6.6 gm/dL / ALK PHOS: 103 U/L / ALT: 400 U/L / AST: 204 U/L / GGT: x             RADIOLOGY & ADDITIONAL STUDIES:

## 2023-01-22 NOTE — PROGRESS NOTE ADULT - SUBJECTIVE AND OBJECTIVE BOX
CCU Progress Note    HPI:    S:    Pt seen and examined  HD # 5  FULL CODE  67M   PMHx of HTN, Thyroid Goiter s/p thyroidectomy, HLD BIBEMS from home with AMS. Pt's ex wife concerned about pt reporting that he has been acting strange at home and that he has been saying strange things to their 11 year old son. Pt reports that he is depressed and that he owes money to the IRS. As per EMS pt is on xanax, Ambien and melatonin. Pt denies fever but endorses difficulty breathing, seeing things that other people do not. Pt has not taken xanax in 3 or 4 days. Pt drinks EtOH but denies withdrawal symptoms, non-smoker. Denies SI. Ex wife states speech was slurred. Patient was admitted with altered mental status. Patient was seen in the room and doing better. Patient knows that he is in hospital. Denies any complaints of chest pain, shortness of breath, headache, dizziness, nausea, vomiting or abdominal pain. No fall or head injury. Denies using any drugs    ICU consult today for resp distress s/p episode vomiting    1/20 PM: In resp distress, near resp arrest; intubated. Central vascular access obtained. Pressors started.   1/22 PM: Remains intubated, on levophed. On precedex drip and ATC BZD.    ROS: Unable to obtain      Allergies    No Known Allergies    Intolerances        MEDICATIONS  (STANDING):  chlordiazePOXIDE 25 milliGRAM(s) Oral four times a day  chlorhexidine 0.12% Liquid 15 milliLiter(s) Oral Mucosa every 12 hours  chlorhexidine 4% Liquid 1 Application(s) Topical <User Schedule>  dexMEDEtomidine Infusion 0.6 MICROgram(s)/kG/Hr (16.3 mL/Hr) IV Continuous <Continuous>  folic acid 1 milliGRAM(s) Oral daily  heparin   Injectable 5000 Unit(s) SubCutaneous every 8 hours  lactulose Syrup 10 Gram(s) Oral two times a day  levothyroxine 100 MICROGram(s) Oral daily  midodrine 10 milliGRAM(s) Oral every 8 hours  norepinephrine Infusion 0.1 MICROgram(s)/kG/Min (20.4 mL/Hr) IV Continuous <Continuous>  pantoprazole  Injectable 40 milliGRAM(s) IV Push daily  phytonadione   Solution 5 milliGRAM(s) Oral daily  piperacillin/tazobactam IVPB.. 3.375 Gram(s) IV Intermittent every 8 hours  rifAXIMin 550 milliGRAM(s) Oral two times a day  thiamine IVPB 500 milliGRAM(s) IV Intermittent every 8 hours    MEDICATIONS  (PRN):  albuterol    90 MICROgram(s) HFA Inhaler 2 Puff(s) Inhalation every 6 hours PRN Shortness of Breath  aluminum hydroxide/magnesium hydroxide/simethicone Suspension 30 milliLiter(s) Oral every 4 hours PRN Dyspepsia  hydrALAZINE Injectable 10 milliGRAM(s) IV Push three times a day PRN SBP > 150  LORazepam   Injectable 2 milliGRAM(s) IV Push every 4 hours PRN breakthrough agitation  ondansetron Injectable 4 milliGRAM(s) IV Push every 8 hours PRN Nausea and/or Vomiting  sodium chloride 0.9% lock flush 10 milliLiter(s) IV Push every 1 hour PRN Pre/post blood products, medications, blood draw, and to maintain line patency      Drug Dosing Weight    Weight (kg): 108.9 (18 Jan 2023 17:31)    PAST MEDICAL & SURGICAL HISTORY:  Intranasal mass      Hypertension, unspecified type      Hyperlipidemia, unspecified hyperlipidemia type      Thyroid nodule  left thyroidectomy      Asthma      H/O partial thyroidectomy  2008      S/P T&amp;A (status post tonsillectomy and adenoidectomy)  1965          FAMILY HISTORY:          ROS: See HPI; otherwise, all systems reviewed and negative.    O:    ICU Vital Signs Last 24 Hrs  T(C): 37.6 (22 Jan 2023 04:00), Max: 37.7 (22 Jan 2023 00:59)  T(F): 99.6 (22 Jan 2023 04:00), Max: 99.8 (22 Jan 2023 00:59)  HR: 87 (22 Jan 2023 07:00) (87 - 99)  BP: --  BP(mean): --  ABP: 109/72 (22 Jan 2023 07:00) (96/63 - 139/136)  ABP(mean): 87 (22 Jan 2023 07:00) (75 - 137)  RR: 23 (22 Jan 2023 07:00) (21 - 25)  SpO2: 98% (22 Jan 2023 07:00) (96% - 100%)    O2 Parameters below as of 21 Jan 2023 18:00  Patient On (Oxygen Delivery Method): ventilator  O2 Flow (L/min): 40          ABG - ( 21 Jan 2023 05:16 )  pH, Arterial: 7.35  pH, Blood: x     /  pCO2: 43    /  pO2: 110   / HCO3: 24    / Base Excess: x     /  SaO2: 99                  I&O's Detail    21 Jan 2023 07:01  -  22 Jan 2023 07:00  --------------------------------------------------------  IN:    Dexmedetomidine: 694 mL    IV PiggyBack: 950 mL    Nepro with Carb Steady: 374 mL    Norepinephrine: 624 mL  Total IN: 2642 mL    OUT:    Indwelling Catheter - Urethral (mL): 2900 mL  Total OUT: 2900 mL    Total NET: -258 mL          Mode: AC/ CMV (Assist Control/ Continuous Mandatory Ventilation)  RR (machine): 22  TV (machine): 500  FiO2: 40  PEEP: 5  ITime: 0.9  MAP: 9  PIP: 23      PE:    Adult M lying in bed  No JVD trachea midline  + ETT in place connected to ventilator  S1S2+  Coarse BS B/L  Abd soft NTND  No leg swelling/edema noted  Intubated, sedated  Skin pink and warm    LABS:    CBC Full  -  ( 21 Jan 2023 06:10 )  WBC Count : 7.94 K/uL  RBC Count : 4.58 M/uL  Hemoglobin : 14.4 g/dL  Hematocrit : 43.2 %  Platelet Count - Automated : 181 K/uL  Mean Cell Volume : 94.3 fl  Mean Cell Hemoglobin : 31.4 pg  Mean Cell Hemoglobin Concentration : 33.3 gm/dL  Auto Neutrophil # : x  Auto Lymphocyte # : x  Auto Monocyte # : x  Auto Eosinophil # : x  Auto Basophil # : x  Auto Neutrophil % : x  Auto Lymphocyte % : x  Auto Monocyte % : x  Auto Eosinophil % : x  Auto Basophil % : x    01-22    143  |  109<H>  |  62<H>  ----------------------------<  126<H>  3.9   |  28  |  1.56<H>    Ca    8.1<L>      22 Jan 2023 06:00  Phos  5.6     01-21  Mg     2.3     01-21    TPro  6.6  /  Alb  2.4<L>  /  TBili  1.3<H>  /  DBili  0.8<H>  /  AST  204<H>  /  ALT  400<H>  /  AlkPhos  103  01-22    PT/INR - ( 22 Jan 2023 06:00 )   PT: 19.5 sec;   INR: 1.67 ratio         PTT - ( 22 Jan 2023 06:00 )  PTT:33.2 sec    CAPILLARY BLOOD GLUCOSE            LIVER FUNCTIONS - ( 22 Jan 2023 06:00 )  Alb: 2.4 g/dL / Pro: 6.6 gm/dL / ALK PHOS: 103 U/L / ALT: 400 U/L / AST: 204 U/L / GGT: x

## 2023-01-22 NOTE — PROGRESS NOTE ADULT - ASSESSMENT
A:    67M  HD # 5  FULL CODE    Here for:    1. Acute mixed resp failure 2/2  2. PNA, aspiration  3. Shock, suspect septic 2/2 above  4. Alcohol withdrawal syndrome  5. Alcoholic hepatitis  6. CM, suspect alcohol induced  7. AMARJIT ? CKD  8. Elevated ammonia    This patient requires critical care for support of one or more vital organ systems with a high probability of imminent or life threatening deterioration in his/her condition    P:    Alcohol withdrawal syndrome  Complicated by aspiration PNA, septic shock, severe CM, MSOF    Pt required/s my immediate and continued care and presence     Analgosedation with precedex and ATC + PRN BZD; avoid propofol in shock state and severe CM. Daily sedation vacations. Goal RASS 0 to -2.  HD monitoring, on levophed to maintain MAP > 65; EF < 15%; severe LV dysfxn with global dysfxn; ?alcohol +/- cocaine use; will need further w/u when stabilized, cardiology involved, plan per Cards  s/p emergent intubation, vent settings 22/500/5/1.0, f/u ABG; actively titrating and manipulating ventilator to optimize ventilation and oxygenation, acid-base status while minimizing barotrauma  Continue TF's  Lactulose + rifaxamin for hyperammonia, increase from 10mg BID to 20mg QID, titrate to 4 BM daily; lvl unchanged  Broad spectrum abx with zosyn empiric coverage for asp PNA, f/u cultures, send RC  VTE ppx SCD + SQH  Vitamin K for elevated INR  AMARJIT, ? CKD, no indications for emergent RRT; place leal, monitor I/O's, avoid renal toxic meds; bicarb OK  Alcoholic hepatitis, GI involved  f/u labs, replete lytes PRN  Central vascular access obtained, maintain for now  Hgb 14.4, Plt 181  f/u labs, replete lytes PRN    Dispo: Continue critical care. Continue titrating BZDs, abx. SBTs, wean vent as able.     TOTAL CRITICAL CARE TIME:  80/115 minutes (EXCLUSIVE of any non bundled procedures)    Note: This time spent INCLUDES time spent directly as this patient's bedside with evaluation, review of chart including review of laboratory and imaging studies, interpretation of vital signs and cardiac output measurements, any necessary ventilator management, and time spent discussing plan of care with patient and family, including goals of care discussion.

## 2023-01-22 NOTE — PROGRESS NOTE ADULT - ASSESSMENT
66 yo male with multiple medical issues including alcoholic hepatitis, now on vent after aspiration pneumonia. Would continue supportive care and observe.

## 2023-01-22 NOTE — PROGRESS NOTE ADULT - SUBJECTIVE AND OBJECTIVE BOX
CHIEF COMPLAINT: found to have AMS     HPI:   67 year old male with PMHx of HTN, Thyroid Goiter s/p thyroidectomy, HLD BIBEMS from home with AMS. Pt's ex wife concerned about pt reporting that he has been acting strange at home and that he has been saying strange things to their 11 year old son. Pt reports that he is depressed and that he owes money to the IRS. As per EMS pt is on xanax, Ambien and melatonin. Pt denies fever but endorses difficulty breathing, seeing things that other people do not. Pt has not taken xanax in 3 or 4 days. Pt drinks EtOH but denies withdrawal symptoms, non-smoker. Denies SI. Ex wife states speech was slurred. Patient was admitted with altered mental status. Patient was seen in the room and doing better. Patient knows that he is in hospital. Denies any complaints of chest pain, shortness of breath, headache, dizziness, nausea, vomiting or abdominal pain. No fall or head injury. Denies using any drugs.     1/20/23 Patient  was receiving sedation , did vomit this morning , subsequent worsening of hypoxia , patient was intubated , patient noted to have severe ventricular systolic dysfunction , dilated aorta , as per his ex wife , patient was not seeing any physician for several  years , patient does drink regularly   patient cxr at the time of admission ,showed no evidence of pleural effusion , however todays CXR showed bilateral pleural effusion ,   1/21/23 Patient remain intubated , sedated , mild hypotensive maintained with pressor low dose , improving oxygenation ,   improving renal , LFTS   1/22/23 Patient is remain same  , on lower dose of pressor , oxygen 40% intubated     PAST MEDICAL & SURGICAL HISTORY:  Intranasal mass      Hypertension, unspecified type      Hyperlipidemia, unspecified hyperlipidemia type      Thyroid nodule  left thyroidectomy      Asthma      H/O partial thyroidectomy  2008      S/P T&amp;A (status post tonsillectomy and adenoidectomy)  1965          Allergies    No Known Allergies    Intolerances        SOCIAL HISTORY:    alcoholic  positive for cocaine , THC       FAMILY HISTORY:  unknown     MEDICATIONS:Home Medications:  ALPRAZolam 1 mg oral tablet: 1 tab(s) orally every 8 hours, As Needed - for anxiety (19 Jan 2023 09:53)  losartan 100 mg oral tablet: 1 tab(s) orally once a day (in the morning) (19 Jan 2023 09:53)  Multiple Vitamins oral tablet: 1 tab(s) orally once a day. last dose 2/ 12/ 2019 (19 Jan 2023 09:53)  rosuvastatin 5 mg oral tablet: 1 tab(s) orally once a day (in the morning) (19 Jan 2023 09:53)  sildenafil 100 mg oral tablet: 1 tab(s) orally once a day, As Needed (19 Jan 2023 09:53)  Synthroid 100 mcg (0.1 mg) oral tablet: 1 tab(s) orally once a day (in the morning) (19 Jan 2023 09:53)  Ventolin HFA 90 mcg/inh inhalation aerosol: 2 puff(s) inhaled 4 times a day, As Needed (19 Jan 2023 09:53)  zolpidem 10 mg oral tablet: 1 tab(s) orally once a day (at bedtime), As Needed (19 Jan 2023 09:53)    MEDICATIONS  (STANDING):  chlordiazePOXIDE 25 milliGRAM(s) Oral four times a day  chlorhexidine 0.12% Liquid 15 milliLiter(s) Oral Mucosa every 12 hours  chlorhexidine 4% Liquid 1 Application(s) Topical <User Schedule>  dexMEDEtomidine Infusion 0.6 MICROgram(s)/kG/Hr (16.3 mL/Hr) IV Continuous <Continuous>  folic acid 1 milliGRAM(s) Oral daily  heparin   Injectable 5000 Unit(s) SubCutaneous every 8 hours  lactulose Syrup 10 Gram(s) Oral two times a day  levothyroxine 100 MICROGram(s) Oral daily  midodrine 10 milliGRAM(s) Oral every 8 hours  norepinephrine Infusion 0.1 MICROgram(s)/kG/Min (20.4 mL/Hr) IV Continuous <Continuous>  pantoprazole  Injectable 40 milliGRAM(s) IV Push daily  phytonadione   Solution 5 milliGRAM(s) Oral daily  piperacillin/tazobactam IVPB.. 3.375 Gram(s) IV Intermittent every 8 hours  rifAXIMin 550 milliGRAM(s) Oral two times a day  thiamine IVPB 500 milliGRAM(s) IV Intermittent every 8 hours    MEDICATIONS  (PRN):  albuterol    90 MICROgram(s) HFA Inhaler 2 Puff(s) Inhalation every 6 hours PRN Shortness of Breath  aluminum hydroxide/magnesium hydroxide/simethicone Suspension 30 milliLiter(s) Oral every 4 hours PRN Dyspepsia  hydrALAZINE Injectable 10 milliGRAM(s) IV Push three times a day PRN SBP > 150  LORazepam   Injectable 2 milliGRAM(s) IV Push every 4 hours PRN breakthrough agitation  ondansetron Injectable 4 milliGRAM(s) IV Push every 8 hours PRN Nausea and/or Vomiting  sodium chloride 0.9% lock flush 10 milliLiter(s) IV Push every 1 hour PRN Pre/post blood products, medications, blood draw, and to maintain line patency    REVIEW OF SYSTEMS:    intubated  unable to obtain      ICU Vital Signs Last 24 Hrs  T(C): 37.6 (22 Jan 2023 04:00), Max: 37.7 (22 Jan 2023 00:59)  T(F): 99.6 (22 Jan 2023 04:00), Max: 99.8 (22 Jan 2023 00:59)  HR: 87 (22 Jan 2023 07:00) (87 - 99)  BP: --  BP(mean): --  ABP: 109/72 (22 Jan 2023 07:00) (96/63 - 139/136)  ABP(mean): 87 (22 Jan 2023 07:00) (75 - 137)  RR: 23 (22 Jan 2023 07:00) (21 - 25)  SpO2: 98% (22 Jan 2023 07:00) (96% - 100%)    O2 Parameters below as of 21 Jan 2023 18:00  Patient On (Oxygen Delivery Method): ventilator  O2 Flow (L/min): 40    PHYSICAL EXAM:    Constitutional: NAD, intubate obese   HEENT: PERR, EOMI,  No oral cyananosis.  Neck:  supple,  No JVD  Respiratory: Breath sounds are clear bilaterally, No wheezing, rales or rhonchi  Cardiovascular: S1 and S2, regular tachycardic  and rhythm, no Murmurs, gallops or rubs  Gastrointestinal: Bowel Sounds present, soft, nontender.   Extremities: No peripheral edema. No clubbing or cyanosis.  Vascular: 2+ peripheral pulses  Neurological: intubated   Musculoskeletal: no calf tenderness.  Skin: No rashes.      LABS: All Labs Reviewed:                            14.4   7.94  )-----------( 181      ( 21 Jan 2023 06:10 )             43.2     01-22    143  |  109<H>  |  62<H>  ----------------------------<  126<H>  3.9   |  28  |  1.56<H>    Ca    8.1<L>      22 Jan 2023 06:00  Phos  5.6     01-21  Mg     2.3     01-21    TPro  6.6  /  Alb  2.4<L>  /  TBili  1.3<H>  /  DBili  0.8<H>  /  AST  204<H>  /  ALT  400<H>  /  AlkPhos  103  01-22        LIVER FUNCTIONS - ( 22 Jan 2023 06:00 )  Alb: 2.4 g/dL / Pro: 6.6 gm/dL / ALK PHOS: 103 U/L / ALT: 400 U/L / AST: 204 U/L / GGT: x           PT/INR - ( 22 Jan 2023 06:00 )   PT: 19.5 sec;   INR: 1.67 ratio         PTT - ( 22 Jan 2023 06:00 )  PTT:33.2 sec    01-20 Chol 76 LDL -- HDL 15<L> Trig 75    ECHO reviewed , severely dilated LV severe global left ventricular dysfunction ,  mild RV dysfunction ,  dilated aortic root , ascending aorta , moderate AI     < from: 12 Lead ECG (01.18.23 @ 20:57) >  Sinus tachycardia with Premature supraventricular complexes  Pulmonary disease pattern  Septal infarct , age undetermined  Abnormal ECG  When compared with ECG of 12-FEB-2019 11:55,  Premature supraventricular complexes are now Present  Vent. rate has increased BY44 BPM  Nonspecific T wave abnormality, worse in Inferior leads  T wave inversion more evident in Lateral leads  Confirmed by MD JENNY, PRIYA ([id number]) on 1/19/2023 9:01:18 AM    < end of copied text >  < from: Xray Chest 1 View-PORTABLE IMMEDIATE (Xray Chest 1 View-PORTABLE IMMEDIATE .) (01.20.23 @ 15:30) >    Impression:    There are small to moderate bilateral pleural effusions with lower lobe   consolidations.    No pulmonary venous congestion or pneumothorax.    Support lines and catheters as above.    --- End of Report ---    < end of copied text >  < from: CT Abdomen and Pelvis No Cont (01.18.23 @ 20:13) >  LOWER CHEST: Small bilateral pleural effusions and partial compressive   atelectasis of both lower lobes. Cardiomegaly.    < end of copied text >  < from: CT Abdomen and Pelvis No Cont (01.18.23 @ 20:13) >    IMPRESSION:  No acute intra-abdominal pathology.    Small volume abdominopelvic ascites.    --- End of Report ---    < end of copied text >      Blood Culture:     01-19 @ 09:48  TSH: 6.30  01-18 @ 18:26  TSH: 7.76      < from: TTE Echo Complete w/o Contrast w/ Doppler (01.20.23 @ 11:00) >     Normal appearing mitral valve structure and function.   Mild mitral annular calcification is present.   Mild (1+) mitral regurgitation is present.   Normal aortic valve structure and function.   The ascending aorta and aortic root are dilated.   At least Mild (1+) aortic regurgitation is present.   Normal appearing tricuspid valve structure.   At least Mild (1+) tricuspid valve regurgitation is present.   Normal appearing pulmonic valve structure and function.   The left atrium is mildly dilated.   Severely reduced left ventricular systolic function.   The left ventricle cavity is dilated.   Estimated left ventricular ejection fraction is 15-20 %.   Dr. Will aware of study findings.   The right atrium appears mildly dilated.   Normal appearing right ventricle function.   The IVC is dilated with decreased respiratory variation.     Recommendation    < end of copied text >  < from: Xray Chest 1 View-PORTABLE IMMEDIATE (Xray Chest 1 View-PORTABLE IMMEDIATE .) (01.20.23 @ 15:30) >  There are small to moderate bilateral pleural effusions with lower lobe   consolidations.    No pulmonary venous congestion or pneumothorax.    Support lines and catheters as above.    < end of copied text >

## 2023-01-23 NOTE — DIETITIAN INITIAL EVALUATION ADULT - PERTINENT MEDS FT
MEDICATIONS  (STANDING):  chlordiazePOXIDE 25 milliGRAM(s) Oral four times a day  chlorhexidine 0.12% Liquid 15 milliLiter(s) Oral Mucosa every 12 hours  chlorhexidine 4% Liquid 1 Application(s) Topical <User Schedule>  dexMEDEtomidine Infusion 0.6 MICROgram(s)/kG/Hr (16.3 mL/Hr) IV Continuous <Continuous>  folic acid 1 milliGRAM(s) Oral daily  heparin   Injectable 5000 Unit(s) SubCutaneous every 8 hours  lactulose Syrup 20 Gram(s) Oral every 6 hours  levothyroxine 100 MICROGram(s) Oral daily  midodrine 10 milliGRAM(s) Oral every 8 hours  norepinephrine Infusion 0.1 MICROgram(s)/kG/Min (20.4 mL/Hr) IV Continuous <Continuous>  pantoprazole  Injectable 40 milliGRAM(s) IV Push daily  piperacillin/tazobactam IVPB.. 3.375 Gram(s) IV Intermittent every 8 hours  rifAXIMin 550 milliGRAM(s) Oral two times a day  thiamine IVPB 500 milliGRAM(s) IV Intermittent every 8 hours    MEDICATIONS  (PRN):  albuterol    90 MICROgram(s) HFA Inhaler 2 Puff(s) Inhalation every 6 hours PRN Shortness of Breath  aluminum hydroxide/magnesium hydroxide/simethicone Suspension 30 milliLiter(s) Oral every 4 hours PRN Dyspepsia  hydrALAZINE Injectable 10 milliGRAM(s) IV Push three times a day PRN SBP > 150  LORazepam   Injectable 2 milliGRAM(s) IV Push every 4 hours PRN breakthrough agitation  ondansetron Injectable 4 milliGRAM(s) IV Push every 8 hours PRN Nausea and/or Vomiting  sodium chloride 0.9% lock flush 10 milliLiter(s) IV Push every 1 hour PRN Pre/post blood products, medications, blood draw, and to maintain line patency    Home Medications:  ALPRAZolam 1 mg oral tablet: 1 tab(s) orally every 8 hours, As Needed - for anxiety (19 Jan 2023 09:53)  losartan 100 mg oral tablet: 1 tab(s) orally once a day (in the morning) (19 Jan 2023 09:53)  Multiple Vitamins oral tablet: 1 tab(s) orally once a day. last dose 2/ 12/ 2019 (19 Jan 2023 09:53)  rosuvastatin 5 mg oral tablet: 1 tab(s) orally once a day (in the morning) (19 Jan 2023 09:53)  sildenafil 100 mg oral tablet: 1 tab(s) orally once a day, As Needed (19 Jan 2023 09:53)  Synthroid 100 mcg (0.1 mg) oral tablet: 1 tab(s) orally once a day (in the morning) (19 Jan 2023 09:53)  Ventolin HFA 90 mcg/inh inhalation aerosol: 2 puff(s) inhaled 4 times a day, As Needed (19 Jan 2023 09:53)  zolpidem 10 mg oral tablet: 1 tab(s) orally once a day (at bedtime), As Needed (19 Jan 2023 09:53)

## 2023-01-23 NOTE — DIETITIAN INITIAL EVALUATION ADULT - ENTERAL
Initiate Nepro @ 40mL/hr, increase by 10mL/hr q6 hours until goal rate of 70mL/hr met with 1 packet of Prosource TF. Will provide ~ 2600kcal, 135g protein, and   1091mL free water. Free water flushes of 40mL/hr x 24 hours (provides 960mL/24 hours). Initiate Nepro @ 40mL/hr, increase by 10mL/hr q6 hours until goal rate of 70mL/hr met with 2 packets of Prosource TF. Will provide ~ 2600kcal, 135g protein, and   1091mL free water. Free water flushes of 40mL/hr x 24 hours (provides 960mL/24 hours).

## 2023-01-23 NOTE — PROGRESS NOTE ADULT - ASSESSMENT
66 y/o male with pmhx of polysubstance abuse, etoh abuse, goiter s/p thyroidectomy, HTN, HLD admitted on 1/18 with hallucinations due to etoh withdrawal found to have severe LV dysfuction and AMARJIT of unclear duration transferred to CCU on 1/20 with mixed respiratory failure due to aspiration pneumonitis and worsening mental status reuqiring intubation. post intubation c/b shock requiring pressors.       1. etoh withdrawal  2. cocaine/benzo withdrawal  3. hypoxic respiratory failure  4. aspiraiton pneumonitis  5. shock, unspecified    NEURO: sedated on precedex with librium taper and ativan prn. thiamine 500 mg x 3 days (last dose today, then put on maintenance). trend ammonia. on rifaximin. lactulose increased to q6 as ammonia plateauing at 48. folic acid and MV.  CVS: actively titrating levophed for goal MAP 65-70. TTE with EF 15-20%. unclear etiology. eventual ischemic workup. defer midodrine due to severe cardiomyopathy  PULM: lung protective vent strategy. fio2 titrated down to 35%. daily SBT as mental status allows.   GI: tube fees as tolerated with protonix for gi prophylaxis. DF 61. defer steroids due to ongoing infection. transaminitis improving  RENAL: trend Cr, improving. nonoliguric. with proteinuria. renal following  ID: zosyn for aspiration pna  ENDO: goal -180  HEME: heparin for dvt prophylaxis  DISPO: full code

## 2023-01-23 NOTE — PROGRESS NOTE ADULT - SUBJECTIVE AND OBJECTIVE BOX
CHIEF COMPLAINT: found to have AMS     HPI:   67 year old male with PMHx of HTN, Thyroid Goiter s/p thyroidectomy, HLD BIBEMS from home with AMS. Pt's ex wife concerned about pt reporting that he has been acting strange at home and that he has been saying strange things to their 11 year old son. Pt reports that he is depressed and that he owes money to the IRS. As per EMS pt is on xanax, Ambien and melatonin. Pt denies fever but endorses difficulty breathing, seeing things that other people do not. Pt has not taken xanax in 3 or 4 days. Pt drinks EtOH but denies withdrawal symptoms, non-smoker. Denies SI. Ex wife states speech was slurred. Patient was admitted with altered mental status. Patient was seen in the room and doing better. Patient knows that he is in hospital. Denies any complaints of chest pain, shortness of breath, headache, dizziness, nausea, vomiting or abdominal pain. No fall or head injury. Denies using any drugs.     1/20/23 Patient  was receiving sedation , did vomit this morning , subsequent worsening of hypoxia , patient was intubated , patient noted to have severe ventricular systolic dysfunction , dilated aorta , as per his ex wife , patient was not seeing any physician for several  years , patient does drink regularly   patient cxr at the time of admission ,showed no evidence of pleural effusion , however todays CXR showed bilateral pleural effusion ,   1/21/23 Patient remain intubated , sedated , mild hypotensive maintained with pressor low dose , improving oxygenation ,   improving renal , LFTS   1/22/23 Patient is remain same  , on lower dose of pressor , oxygen 40% intubated   1/23/'23: intubated sedated.    MEDICATIONS:  OUTPATIENT  Home Medications:  ALPRAZolam 1 mg oral tablet: 1 tab(s) orally every 8 hours, As Needed - for anxiety (19 Jan 2023 09:53)  losartan 100 mg oral tablet: 1 tab(s) orally once a day (in the morning) (19 Jan 2023 09:53)  Multiple Vitamins oral tablet: 1 tab(s) orally once a day. last dose 2/ 12/ 2019 (19 Jan 2023 09:53)  rosuvastatin 5 mg oral tablet: 1 tab(s) orally once a day (in the morning) (19 Jan 2023 09:53)  sildenafil 100 mg oral tablet: 1 tab(s) orally once a day, As Needed (19 Jan 2023 09:53)  Synthroid 100 mcg (0.1 mg) oral tablet: 1 tab(s) orally once a day (in the morning) (19 Jan 2023 09:53)  Ventolin HFA 90 mcg/inh inhalation aerosol: 2 puff(s) inhaled 4 times a day, As Needed (19 Jan 2023 09:53)  zolpidem 10 mg oral tablet: 1 tab(s) orally once a day (at bedtime), As Needed (19 Jan 2023 09:53)      INPATIENT  MEDICATIONS  (STANDING):  allopurinol 200 milliGRAM(s) Oral daily  chlordiazePOXIDE 25 milliGRAM(s) Oral four times a day  chlorhexidine 0.12% Liquid 15 milliLiter(s) Oral Mucosa every 12 hours  chlorhexidine 4% Liquid 1 Application(s) Topical <User Schedule>  dexMEDEtomidine Infusion 0.6 MICROgram(s)/kG/Hr (16.3 mL/Hr) IV Continuous <Continuous>  folic acid 1 milliGRAM(s) Oral daily  heparin   Injectable 5000 Unit(s) SubCutaneous every 8 hours  lactulose Syrup 20 Gram(s) Oral every 6 hours  levothyroxine 100 MICROGram(s) Oral daily  midodrine 10 milliGRAM(s) Oral every 8 hours  norepinephrine Infusion 0.1 MICROgram(s)/kG/Min (20.4 mL/Hr) IV Continuous <Continuous>  pantoprazole  Injectable 40 milliGRAM(s) IV Push daily  piperacillin/tazobactam IVPB.. 3.375 Gram(s) IV Intermittent every 8 hours  rifAXIMin 550 milliGRAM(s) Oral two times a day  thiamine IVPB 500 milliGRAM(s) IV Intermittent every 8 hours    MEDICATIONS  (PRN):  albuterol    90 MICROgram(s) HFA Inhaler 2 Puff(s) Inhalation every 6 hours PRN Shortness of Breath  hydrALAZINE Injectable 10 milliGRAM(s) IV Push three times a day PRN SBP > 150  LORazepam   Injectable 2 milliGRAM(s) IV Push every 4 hours PRN breakthrough agitation  ondansetron Injectable 4 milliGRAM(s) IV Push every 8 hours PRN Nausea and/or Vomiting  sodium chloride 0.9% lock flush 10 milliLiter(s) IV Push every 1 hour PRN Pre/post blood products, medications, blood draw, and to maintain line patency          Vital Signs Last 24 Hrs  T(C): 37.2 (23 Jan 2023 16:22), Max: 38.3 (23 Jan 2023 00:00)  T(F): 98.9 (23 Jan 2023 16:22), Max: 101 (23 Jan 2023 00:00)  HR: 78 (23 Jan 2023 20:00) (72 - 94)  BP: --  BP(mean): --  RR: 23 (23 Jan 2023 20:00) (19 - 31)  SpO2: 100% (23 Jan 2023 20:00) (96% - 100%)    Parameters below as of 23 Jan 2023 08:00  Patient On (Oxygen Delivery Method): ventilator    O2 Concentration (%): 40Daily     Daily I&O's Summary    22 Jan 2023 07:01  -  23 Jan 2023 07:00  --------------------------------------------------------  IN: 3163 mL / OUT: 1925 mL / NET: 1238 mL    23 Jan 2023 07:01  -  23 Jan 2023 20:40  --------------------------------------------------------  IN: 1102 mL / OUT: 900 mL / NET: 202 mL        I&O's Detail    22 Jan 2023 07:01  -  23 Jan 2023 07:00  --------------------------------------------------------  IN:    Dexmedetomidine: 457 mL    Dexmedetomidine: 391 mL    IV PiggyBack: 1150 mL    Nepro with Carb Steady: 920 mL    Norepinephrine: 245 mL  Total IN: 3163 mL    OUT:    Indwelling Catheter - Urethral (mL): 1925 mL  Total OUT: 1925 mL    Total NET: 1238 mL      23 Jan 2023 07:01  -  23 Jan 2023 20:40  --------------------------------------------------------  IN:    Dexmedetomidine: 314 mL    IV PiggyBack: 250 mL    Nepro with Carb Steady: 440 mL    Norepinephrine: 98 mL  Total IN: 1102 mL    OUT:    Indwelling Catheter - Urethral (mL): 900 mL  Total OUT: 900 mL    Total NET: 202 mL          I&O's Summary    22 Jan 2023 07:01  -  23 Jan 2023 07:00  --------------------------------------------------------  IN: 3163 mL / OUT: 1925 mL / NET: 1238 mL    23 Jan 2023 07:01  -  23 Jan 2023 20:40  --------------------------------------------------------  IN: 1102 mL / OUT: 900 mL / NET: 202 mL        PHYSICAL EXAM:    Constitutional: NAD, intubate obese   HEENT: PERR, EOMI,  No oral cyananosis.  Neck:  supple,  No JVD  Respiratory: Breath sounds are clear bilaterally, No wheezing, rales or rhonchi  Cardiovascular: S1 and S2, regular tachycardic  and rhythm, no Murmurs, gallops or rubs  Gastrointestinal: Bowel Sounds present, soft, nontender.   Extremities: No peripheral edema. No clubbing or cyanosis.  Vascular: 2+ peripheral pulses  Neurological: intubated   Musculoskeletal: no calf tenderness.  Skin: No rashes.        ===============================  ===============================  LABS:                         14.4   7.94  )-----------( 181      ( 21 Jan 2023 06:10 )             43.2     23 Jan 2023 07:30    144    |  113    |  48     ----------------------------<  146    3.6     |  26     |  1.48   22 Jan 2023 06:00    143    |  109    |  62     ----------------------------<  126    3.9     |  28     |  1.56   21 Jan 2023 06:10    139    |  105    |  81     ----------------------------<  166    4.1     |  25     |  2.04     Ca    8.1        23 Jan 2023 07:30  Ca    8.1        22 Jan 2023 06:00  Ca    8.2        21 Jan 2023 06:10  Phos  5.6       21 Jan 2023 06:10  Mg     2.3       21 Jan 2023 06:10    TPro  6.6    /  Alb  2.4    /  TBili  1.3    /  DBili  0.8    /  AST  204    /  ALT  400    /  AlkPhos  103    22 Jan 2023 06:00  TPro  6.8    /  Alb  2.6    /  TBili  1.3    /  DBili  x      /  AST  339    /  ALT  557    /  AlkPhos  105    21 Jan 2023 06:10    PT/INR - ( 22 Jan 2023 06:00 )   PT: 19.5 sec;   INR: 1.67 ratio         PTT - ( 22 Jan 2023 06:00 )  PTT:33.2 sec  ===============================  ===============================    ECHO reviewed , severely dilated LV severe global left ventricular dysfunction ,  mild RV dysfunction ,  dilated aortic root , ascending aorta , moderate AI     < from: 12 Lead ECG (01.18.23 @ 20:57) >  Sinus tachycardia with Premature supraventricular complexes  Pulmonary disease pattern  Septal infarct , age undetermined  Abnormal ECG  When compared with ECG of 12-FEB-2019 11:55,  Premature supraventricular complexes are now Present  Vent. rate has increased BY44 BPM  Nonspecific T wave abnormality, worse in Inferior leads  T wave inversion more evident in Lateral leads  Confirmed by MD ALVARADO PAUL ([id number]) on 1/19/2023 9:01:18 AM    < end of copied text >  < from: Xray Chest 1 View-PORTABLE IMMEDIATE (Xray Chest 1 View-PORTABLE IMMEDIATE .) (01.20.23 @ 15:30) >    Impression:    There are small to moderate bilateral pleural effusions with lower lobe   consolidations.    No pulmonary venous congestion or pneumothorax.    Support lines and catheters as above.    --- End of Report ---    < end of copied text >  < from: CT Abdomen and Pelvis No Cont (01.18.23 @ 20:13) >  LOWER CHEST: Small bilateral pleural effusions and partial compressive   atelectasis of both lower lobes. Cardiomegaly.    < end of copied text >  < from: CT Abdomen and Pelvis No Cont (01.18.23 @ 20:13) >    IMPRESSION:  No acute intra-abdominal pathology.    Small volume abdominopelvic ascites.    --- End of Report ---    < end of copied text >      Blood Culture:     01-19 @ 09:48  TSH: 6.30  01-18 @ 18:26  TSH: 7.76      < from: TTE Echo Complete w/o Contrast w/ Doppler (01.20.23 @ 11:00) >     Normal appearing mitral valve structure and function.   Mild mitral annular calcification is present.   Mild (1+) mitral regurgitation is present.   Normal aortic valve structure and function.   The ascending aorta and aortic root are dilated.   At least Mild (1+) aortic regurgitation is present.   Normal appearing tricuspid valve structure.   At least Mild (1+) tricuspid valve regurgitation is present.   Normal appearing pulmonic valve structure and function.   The left atrium is mildly dilated.   Severely reduced left ventricular systolic function.   The left ventricle cavity is dilated.   Estimated left ventricular ejection fraction is 15-20 %.   Dr. Will aware of study findings.   The right atrium appears mildly dilated.   Normal appearing right ventricle function.   The IVC is dilated with decreased respiratory variation.     Recommendation    < end of copied text >  < from: Xray Chest 1 View-PORTABLE IMMEDIATE (Xray Chest 1 View-PORTABLE IMMEDIATE .) (01.20.23 @ 15:30) >  There are small to moderate bilateral pleural effusions with lower lobe   consolidations.    No pulmonary venous congestion or pneumothorax.    Support lines and catheters as above.    < end of copied text >        ===============================    Trino Yun M.D.  Cardiology, [facility]  Cell: [phone]  Offices:    ([facility] Office)  [phone] ([facility] Office)

## 2023-01-23 NOTE — PROGRESS NOTE ADULT - ASSESSMENT
68 yo man with HTN and known ETOH abuse admitted with AMS.  Evidence of cocaine use though pt denies.  --AMARJIT with unclear baseline function: in the setting of recent Cocaine use and ARB.    Continue to hold ARB for now.    D/c further IVF for now.  --Continue to monitor renal function and LFTS.  Continue to trend CPK.  --check repeat ammonia level.  --AMS : due to illicit drugs and ETOH.  --LE edema : check urine protein and Echo, franklin with ETOH hx.    1/20 MK   - AMARJIT improving     monitor off ivf     off arb    unclear if with component of ckd   - CHF with ef of 15%  - hypoxia with recent aspiration   - ? asymptomatic hyperuricemia : fu trend     and start allopurinol   marcie bauer    1/21 MK   - AMARJIT improving     monitor off ivf     off arb    unclear if with component of ckd   - CHF with ef of 15%  - hypoxia with recent aspiration, no intubated     on zosyn renally dosed  - ? cirrhosis on rifampin   - ? asymptomatic hyperuricemia : fu trend      allopurinol started     1/22 MK   - AMARJIT improving     monitor off ivf     off arb    unclear if with component of ckd   - CHF with ef of 15%  - hypoxia with recent aspiration, no intubated     on zosyn renally dosed  - ? ETOH cirrhosis on rifampin and lactuose  - ? asymptomatic hyperuricemia : fu trend      allopurinol started, fu levels today   - FLC elevated, check MOSES send     1/23 MK   - AMARJIT improving     monitor off ivf     off arb    unclear if with component of ckd   - CHF with ef of 15%  - hypoxia with recent aspiration, no intubated     on zosyn renally dosed    planned for weaning of sedation   - ? ETOH cirrhosis on rifampin and lactuose  - ? asymptomatic hyperuricemia : fu trend      allopurinol started, fu levels today      will inc dose of allopurinol further   - FLC elevated, check MOSES send     does not require 24 hour collection   marcie laird

## 2023-01-23 NOTE — DIETITIAN INITIAL EVALUATION ADULT - NS FNS DIET ORDER
Diet, NPO with Tube Feed:   Tube Feeding Modality: Orogastric  Nepro with Carb Steady (NEPRORTH)  Total Volume for 24 Hours (mL): 960  Continuous  Starting Tube Feed Rate {mL per Hour}: 10  Increase Tube Feed Rate by (mL): 10     Every 4 hours  Until Goal Tube Feed Rate (mL per Hour): 40  Tube Feed Duration (in Hours): 24  Tube Feed Start Time: 01:00 (01-21-23 @ 00:39) [Active]

## 2023-01-23 NOTE — PROGRESS NOTE ADULT - SUBJECTIVE AND OBJECTIVE BOX
Patient is a 67y old  Male who presents with a chief complaint of Altered mental status. (22 Jan 2023 12:35)      BRIEF HOSPITAL COURSE:  66 y/o male with pmhx of polysubstance abuse, etoh abuse, goiter s/p thyroidectomy, HTN, HLD admitted on 1/18 with hallucinations due to etoh withdrawal found to have severe LV dysfuction and AMARJIT of unclear duration transferred to CCU on 1/20 with mixed respiratory failure due to aspiration pneumonitis and worsening mental status reuqiring intubation. post intubation c/b shock requiring pressors.     Events last 24 hours: remains intubated and on pressors      PAST MEDICAL & SURGICAL HISTORY:  Intranasal mass      Hypertension, unspecified type      Hyperlipidemia, unspecified hyperlipidemia type      Thyroid nodule  left thyroidectomy      Asthma      H/O partial thyroidectomy  2008      S/P T&amp;A (status post tonsillectomy and adenoidectomy)  1965          Review of Systems:  unable to obtain due to current clinical condition sedated and intubated      Medications:  piperacillin/tazobactam IVPB.. 3.375 Gram(s) IV Intermittent every 8 hours  rifAXIMin 550 milliGRAM(s) Oral two times a day    hydrALAZINE Injectable 10 milliGRAM(s) IV Push three times a day PRN  midodrine 10 milliGRAM(s) Oral every 8 hours  norepinephrine Infusion 0.1 MICROgram(s)/kG/Min IV Continuous <Continuous>    albuterol    90 MICROgram(s) HFA Inhaler 2 Puff(s) Inhalation every 6 hours PRN    chlordiazePOXIDE 25 milliGRAM(s) Oral four times a day  dexMEDEtomidine Infusion 0.6 MICROgram(s)/kG/Hr IV Continuous <Continuous>  LORazepam   Injectable 2 milliGRAM(s) IV Push every 4 hours PRN  ondansetron Injectable 4 milliGRAM(s) IV Push every 8 hours PRN      heparin   Injectable 5000 Unit(s) SubCutaneous every 8 hours    aluminum hydroxide/magnesium hydroxide/simethicone Suspension 30 milliLiter(s) Oral every 4 hours PRN  lactulose Syrup 20 Gram(s) Oral every 6 hours  pantoprazole  Injectable 40 milliGRAM(s) IV Push daily      levothyroxine 100 MICROGram(s) Oral daily    folic acid 1 milliGRAM(s) Oral daily  phytonadione   Solution 5 milliGRAM(s) Oral daily  sodium chloride 0.9% lock flush 10 milliLiter(s) IV Push every 1 hour PRN  thiamine IVPB 500 milliGRAM(s) IV Intermittent every 8 hours      chlorhexidine 0.12% Liquid 15 milliLiter(s) Oral Mucosa every 12 hours  chlorhexidine 4% Liquid 1 Application(s) Topical <User Schedule>        Mode: AC/ CMV (Assist Control/ Continuous Mandatory Ventilation)  RR (machine): 22  TV (machine): 500  FiO2: 40  PEEP: 5  ITime: 1  MAP: 9  PIP: 17      ICU Vital Signs Last 24 Hrs  T(C): 37.8 (22 Jan 2023 20:00), Max: 38 (22 Jan 2023 17:00)  T(F): 100 (22 Jan 2023 20:00), Max: 100.4 (22 Jan 2023 17:00)  HR: 84 (23 Jan 2023 00:00) (66 - 94)  BP: --  BP(mean): --  ABP: 108/71 (23 Jan 2023 00:00) (82/51 - 139/136)  ABP(mean): 85 (23 Jan 2023 00:00) (61 - 137)  RR: 25 (23 Jan 2023 00:00) (21 - 25)  SpO2: 99% (23 Jan 2023 00:00) (96% - 100%)    O2 Parameters below as of 22 Jan 2023 20:00  Patient On (Oxygen Delivery Method): ventilator    O2 Concentration (%): 40        ABG - ( 21 Jan 2023 05:16 )  pH, Arterial: 7.35  pH, Blood: x     /  pCO2: 43    /  pO2: 110   / HCO3: 24    / Base Excess: x     /  SaO2: 99                  I&O's Detail    21 Jan 2023 07:01  -  22 Jan 2023 07:00  --------------------------------------------------------  IN:    Dexmedetomidine: 694 mL    IV PiggyBack: 950 mL    Nepro with Carb Steady: 374 mL    Norepinephrine: 624 mL  Total IN: 2642 mL    OUT:    Indwelling Catheter - Urethral (mL): 2900 mL  Total OUT: 2900 mL    Total NET: -258 mL      22 Jan 2023 07:01  -  23 Jan 2023 00:11  --------------------------------------------------------  IN:    Dexmedetomidine: 457 mL    IV PiggyBack: 450 mL    Nepro with Carb Steady: 480 mL    Norepinephrine: 144 mL  Total IN: 1531 mL    OUT:    Indwelling Catheter - Urethral (mL): 1275 mL  Total OUT: 1275 mL    Total NET: 256 mL            LABS:                        14.4   7.94  )-----------( 181      ( 21 Jan 2023 06:10 )             43.2     01-22    143  |  109<H>  |  62<H>  ----------------------------<  126<H>  3.9   |  28  |  1.56<H>    Ca    8.1<L>      22 Jan 2023 06:00  Phos  5.6     01-21  Mg     2.3     01-21    TPro  6.6  /  Alb  2.4<L>  /  TBili  1.3<H>  /  DBili  0.8<H>  /  AST  204<H>  /  ALT  400<H>  /  AlkPhos  103  01-22          CAPILLARY BLOOD GLUCOSE        PT/INR - ( 22 Jan 2023 06:00 )   PT: 19.5 sec;   INR: 1.67 ratio         PTT - ( 22 Jan 2023 06:00 )  PTT:33.2 sec    CULTURES:  Culture Results:   Normal Respiratory Citlalli present (01-21 @ 10:49)      Physical Examination:    General: No acute distress.      HEENT: Pupils equal, reactive to light.  Symmetric.    PULM: Clear to auscultation bilaterally, no significant sputum production    NECK: Supple, no lymphadenopathy, trachea midline    CVS: Regular rate and rhythm, no murmurs, rubs, or gallops    ABD: Soft, nondistended, nontender, normoactive bowel sounds, no masses    EXT: No edema, nontender    SKIN: Warm and well perfused, no rashes noted.    NEURO: sedated. combative when sedation lowered    DEVICES:     RADIOLOGY: reviewed    CRITICAL CARE TIME SPENT: 35 minutes of critical care time spent providing medical care for patient's acute illness/conditions that impairs at least one vital organ system and/or poses a high risk of imminent or life threatening deterioration in the patient's condition. It includes time spent evaluating and treating the patient's acute illness as well as time spent reviewing labs, radiology, discussing goals of care with patient and/or patient's family, and discussing the case with a multidisciplinary team in an effort to prevent further life threatening deterioration or end organ damage. This time is independent of any procedures performed.

## 2023-01-23 NOTE — DIETITIAN INITIAL EVALUATION ADULT - ETIOLOGY
r/t decreased ability to meet increased nutrient needs 2/2 substance abuse/ withdrawal, AMS, aspiration PNA requiring intubation

## 2023-01-23 NOTE — PROGRESS NOTE ADULT - SUBJECTIVE AND OBJECTIVE BOX
Patient is a 67y old  Male who presents with a chief complaint of Altered mental status. (23 Jan 2023 11:30)      Subjective: Patient seen and examined at bedside. Remains intubated, sedated and on pressor support.       PAST MEDICAL & SURGICAL HISTORY:  Intranasal mass      Hypertension, unspecified type      Hyperlipidemia, unspecified hyperlipidemia type      Thyroid nodule  left thyroidectomy      Asthma      H/O partial thyroidectomy  2008      S/P T&amp;A (status post tonsillectomy and adenoidectomy)  1965          MEDICATIONS  (STANDING):  allopurinol 200 milliGRAM(s) Oral daily  chlordiazePOXIDE 25 milliGRAM(s) Oral four times a day  chlorhexidine 0.12% Liquid 15 milliLiter(s) Oral Mucosa every 12 hours  chlorhexidine 4% Liquid 1 Application(s) Topical <User Schedule>  dexMEDEtomidine Infusion 0.6 MICROgram(s)/kG/Hr (16.3 mL/Hr) IV Continuous <Continuous>  folic acid 1 milliGRAM(s) Oral daily  heparin   Injectable 5000 Unit(s) SubCutaneous every 8 hours  lactulose Syrup 20 Gram(s) Oral every 6 hours  levothyroxine 100 MICROGram(s) Oral daily  midodrine 10 milliGRAM(s) Oral every 8 hours  norepinephrine Infusion 0.1 MICROgram(s)/kG/Min (20.4 mL/Hr) IV Continuous <Continuous>  pantoprazole  Injectable 40 milliGRAM(s) IV Push daily  piperacillin/tazobactam IVPB.. 3.375 Gram(s) IV Intermittent every 8 hours  rifAXIMin 550 milliGRAM(s) Oral two times a day  thiamine IVPB 500 milliGRAM(s) IV Intermittent every 8 hours    MEDICATIONS  (PRN):  albuterol    90 MICROgram(s) HFA Inhaler 2 Puff(s) Inhalation every 6 hours PRN Shortness of Breath  hydrALAZINE Injectable 10 milliGRAM(s) IV Push three times a day PRN SBP > 150  LORazepam   Injectable 2 milliGRAM(s) IV Push every 4 hours PRN breakthrough agitation  ondansetron Injectable 4 milliGRAM(s) IV Push every 8 hours PRN Nausea and/or Vomiting  sodium chloride 0.9% lock flush 10 milliLiter(s) IV Push every 1 hour PRN Pre/post blood products, medications, blood draw, and to maintain line patency      REVIEW OF SYSTEMS:    Unable to obtain as patient intubated and sedated.     Vital Signs Last 24 Hrs  T(C): 37.1 (23 Jan 2023 08:00), Max: 38.3 (23 Jan 2023 00:00)  T(F): 98.7 (23 Jan 2023 08:00), Max: 101 (23 Jan 2023 00:00)  HR: 78 (23 Jan 2023 11:00) (66 - 88)  BP: --  BP(mean): --  RR: 23 (23 Jan 2023 11:00) (22 - 30)  SpO2: 99% (23 Jan 2023 11:00) (96% - 100%)    Parameters below as of 23 Jan 2023 08:00  Patient On (Oxygen Delivery Method): ventilator    O2 Concentration (%): 40    PHYSICAL EXAM:    Constitutional: intubated and sedated, NAD  Respiratory: coarse mechanical breath sounds  Cardiovascular: S1 and S2, RRR  Gastrointestinal: BS+, soft, NT/ND  Extremities: No peripheral edema  Psychiatric: sedated, intubated, non-berbal    LABS:    01-23    144  |  113<H>  |  48<H>  ----------------------------<  146<H>  3.6   |  26  |  1.48<H>    Ca    8.1<L>      23 Jan 2023 07:30    TPro  6.6  /  Alb  2.4<L>  /  TBili  1.3<H>  /  DBili  0.8<H>  /  AST  204<H>  /  ALT  400<H>  /  AlkPhos  103  01-22    PT/INR - ( 22 Jan 2023 06:00 )   PT: 19.5 sec;   INR: 1.67 ratio         PTT - ( 22 Jan 2023 06:00 )  PTT:33.2 sec  LIVER FUNCTIONS - ( 22 Jan 2023 06:00 )  Alb: 2.4 g/dL / Pro: 6.6 gm/dL / ALK PHOS: 103 U/L / ALT: 400 U/L / AST: 204 U/L / GGT: x             RADIOLOGY & ADDITIONAL STUDIES:

## 2023-01-23 NOTE — DIETITIAN INITIAL EVALUATION ADULT - NSFNSGIIOFT_GEN_A_CORE
I&O's Detail    22 Jan 2023 07:01  -  23 Jan 2023 07:00  --------------------------------------------------------  IN:    Dexmedetomidine: 457 mL    Dexmedetomidine: 391 mL    IV PiggyBack: 1150 mL    Nepro with Carb Steady: 920 mL    Norepinephrine: 245 mL  Total IN: 3163 mL    OUT:    Indwelling Catheter - Urethral (mL): 1925 mL  Total OUT: 1925 mL    Total NET: 1238 mL

## 2023-01-23 NOTE — DIETITIAN INITIAL EVALUATION ADULT - OTHER INFO
Unable to obtain meaningful information 2/2 pt being intubated. RD obtained bedscale wt on 1/23/23 - 275.3#.  66 y/o M with a PMHx of HTN, Thyroid Goiter s/p thyroidectomy, HLD presented to ED with AMS with hallucinations, AMARJIT, and ETOH withdrawal. Started on CIWA protocol. Tox screen + for benzos and cocaine. CT A/P: Small volume ascites, likely cirrhosis. Episode of emesis with likely aspiration PNA. S/p emergent intubation and tx to CCU 2/2 acute mixed resp failure 2/2 aspiration PNA, septic shock with hypotension requiring pressors, severe CM, and MSOF. +NGT, TF initiated.     Unable to obtain meaningful information 2/2 pt being intubated. RD obtained bedscale wt on 1/23/23 - 275.3# - wt likely higher than normal 2/2 edema. Pt appears overweight/ obese. NFPE reveals mild fat/ muscle wasting and edema; pt meets criteria for PCM. Nepro running at 40mL at time of visit. K+ WNL, however pt with elevated phos. Will c/w Nepro at this time, however when both K+ and Phos are WNL, would recommend changing TF formula to Jevity 1.5. Please see additional recommendations below.

## 2023-01-23 NOTE — PROGRESS NOTE ADULT - SUBJECTIVE AND OBJECTIVE BOX
66 y/o male with pmhx of polysubstance abuse, etoh abuse, goiter s/p thyroidectomy, HTN, HLD admitted on 1/18 with hallucinations due to etoh withdrawal found to have severe LV dysfunction and AMARJIT of unclear duration transferred to CCU on 1/20 with mixed respiratory failure due to aspiration pneumonitis and worsening mental status requiring intubation. post intubation c/b shock requiring pressors.   Case discussed on CCU round and with the family.      ICU Vital Signs Last 24 Hrs  T(C): 37.2 (23 Jan 2023 16:22), Max: 38.3 (23 Jan 2023 00:00)  T(F): 98.9 (23 Jan 2023 16:22), Max: 101 (23 Jan 2023 00:00)  HR: 84 (23 Jan 2023 15:00) (76 - 94)  ABP: 114/64 (23 Jan 2023 15:00) (92/58 - 114/64)  ABP(mean): 85 (23 Jan 2023 15:00) (70 - 85)  RR: 19 (23 Jan 2023 15:00) (19 - 30)  SpO2: 96% (23 Jan 2023 15:00) (96% - 100%)    O2 Parameters below as of 23 Jan 2023 08:00  Patient On (Oxygen Delivery Method): ventilator    O2 Concentration (%): 40        01-23    144  |  113<H>  |  48<H>  ----------------------------<  146<H>  3.6   |  26  |  1.48<H>    Ca    8.1<L>      23 Jan 2023 07:30    TPro  6.6  /  Alb  2.4<L>  /  TBili  1.3<H>  /  DBili  0.8<H>  /  AST  204<H>  /  ALT  400<H>  /  AlkPhos  103  01-22    LIVER FUNCTIONS - ( 22 Jan 2023 06:00 )  Alb: 2.4 g/dL / Pro: 6.6 gm/dL / ALK PHOS: 103 U/L / ALT: 400 U/L / AST: 204 U/L / GGT: x             PT/INR - ( 22 Jan 2023 06:00 )   PT: 19.5 sec;   INR: 1.67 ratio         PTT - ( 22 Jan 2023 06:00 )  PTT:33.2 sec

## 2023-01-23 NOTE — PROGRESS NOTE ADULT - ASSESSMENT
1. Elevated liver tests in hepatocellular pattern and downtrending at this time. [place] multifactorial from cocaine/benzo and alcohol use  2. Altered mental status  3. Alcohol/substance abuse  4. AMARJIT  5. Aspiration pnuemonia    Recommendation  1. Trend LFT, INR, CPK daily  2. Autoimmune serologies show mild elevation in IgG and positive anti-SM ab. Will need further evaluation as outpatient  3. Tube feeds as tolerated  4. Supportive care

## 2023-01-23 NOTE — DIETITIAN NUTRITION RISK NOTIFICATION - ADDITIONAL COMMENTS/DIETITIAN RECOMMENDATIONS
1) Increase Nepro to recommendations as above  2) Obtain vitamin D 25OH level to assess nutriture  3) monitor hydration status; adjust free water flushes prn, consider free water flushes of 40mL/hr (which provides ~ 960mL of water per day)  4) monitor TF tolerance; keep back of bed > 35 degrees  5) monitor BM: if > 3 days without BM, add bowel regimen prn  6) daily wt checks to track/trend changes  7) Confirm goals of care regarding long-term nutrition support  8) Consider adding thiamine 100 mg daily 2/2 poor PO intake/ malnutrition  RD will continue to monitor PO intake, labs, hydration, and wt prn.

## 2023-01-23 NOTE — PROGRESS NOTE ADULT - SUBJECTIVE AND OBJECTIVE BOX
NEPHROLOGY INTERVAL HPI/OVERNIGHT EVENTS:  01-23-23 @ 11:30      1/23 intubated, sedation to be weaned off, oxygenating well + uop   1/22 intubated and sedated, remains on pressors   1/21 intubated yesterday on precedex and pressors  1/20 pt with episode of emesis with hypoxia, concern for aspiration with adjustment of his ativan dose.  wife at bedside.  unofficial echo with ef of 15%  HPI: Hx obtained from ex wife.  66 yo man with PMHX of HTN,( on Losartan), hx of asthma, living alone, brought to Ed when found to be confused with hallucinations.  Per pt's wife, pt was well until a few days ago.  Yesterday noted with slurred speech and confusion and relates ETOH hx of drinking vodka/screwdriver 4-5 x/day.  On Losartan for HTN but has not seen PMD since pandemic.  No known hx of CKD.  Creat 2.76  and abnormal LFTs.  Urine positive for Cocaine and Benzo, though pt adamantly  denies  illicit drug use  CT non contrast with no intra abdominal pathology, positive for small bilateral pleural effusion with cardiomegaly.    PMHX and PSHX.  --HTN  --Thyroid goiter --post Thyroidectomy  --Post intranasal polyp resection.  --Hx of Asthma      MEDICATIONS  (STANDING):  chlordiazePOXIDE 25 milliGRAM(s) Oral four times a day  chlorhexidine 0.12% Liquid 15 milliLiter(s) Oral Mucosa every 12 hours  chlorhexidine 4% Liquid 1 Application(s) Topical <User Schedule>  dexMEDEtomidine Infusion 0.6 MICROgram(s)/kG/Hr (16.3 mL/Hr) IV Continuous <Continuous>  folic acid 1 milliGRAM(s) Oral daily  heparin   Injectable 5000 Unit(s) SubCutaneous every 8 hours  lactulose Syrup 20 Gram(s) Oral every 6 hours  levothyroxine 100 MICROGram(s) Oral daily  midodrine 10 milliGRAM(s) Oral every 8 hours  norepinephrine Infusion 0.1 MICROgram(s)/kG/Min (20.4 mL/Hr) IV Continuous <Continuous>  pantoprazole  Injectable 40 milliGRAM(s) IV Push daily  piperacillin/tazobactam IVPB.. 3.375 Gram(s) IV Intermittent every 8 hours  rifAXIMin 550 milliGRAM(s) Oral two times a day  thiamine IVPB 500 milliGRAM(s) IV Intermittent every 8 hours    MEDICATIONS  (PRN):  albuterol    90 MICROgram(s) HFA Inhaler 2 Puff(s) Inhalation every 6 hours PRN Shortness of Breath  aluminum hydroxide/magnesium hydroxide/simethicone Suspension 30 milliLiter(s) Oral every 4 hours PRN Dyspepsia  hydrALAZINE Injectable 10 milliGRAM(s) IV Push three times a day PRN SBP > 150  LORazepam   Injectable 2 milliGRAM(s) IV Push every 4 hours PRN breakthrough agitation  ondansetron Injectable 4 milliGRAM(s) IV Push every 8 hours PRN Nausea and/or Vomiting  sodium chloride 0.9% lock flush 10 milliLiter(s) IV Push every 1 hour PRN Pre/post blood products, medications, blood draw, and to maintain line patency      Allergies    No Known Allergies    Intolerances        I&O's Detail    22 Jan 2023 07:01  -  23 Jan 2023 07:00  --------------------------------------------------------  IN:    Dexmedetomidine: 457 mL    Dexmedetomidine: 391 mL    IV PiggyBack: 1150 mL    Nepro with Carb Steady: 920 mL    Norepinephrine: 245 mL  Total IN: 3163 mL    OUT:    Indwelling Catheter - Urethral (mL): 1925 mL  Total OUT: 1925 mL    Total NET: 1238 mL          Vital Signs Last 24 Hrs  T(C): 37.1 (23 Jan 2023 08:00), Max: 38.3 (23 Jan 2023 00:00)  T(F): 98.7 (23 Jan 2023 08:00), Max: 101 (23 Jan 2023 00:00)  HR: 78 (23 Jan 2023 11:00) (66 - 88)  BP: --  BP(mean): --  RR: 23 (23 Jan 2023 11:00) (22 - 30)  SpO2: 99% (23 Jan 2023 11:00) (96% - 100%)    Parameters below as of 23 Jan 2023 08:00  Patient On (Oxygen Delivery Method): ventilator    O2 Concentration (%): 40  Daily     Daily     PHYSICAL EXAM:  General: sedated  HEENT: orally intubated   CV: s1s2 rrr  LUNGS: B/L CTA  EXT: no edema    LABS:    01-23    144  |  113<H>  |  48<H>  ----------------------------<  146<H>  3.6   |  26  |  1.48<H>    Ca    8.1<L>      23 Jan 2023 07:30    TPro  6.6  /  Alb  2.4<L>  /  TBili  1.3<H>  /  DBili  0.8<H>  /  AST  204<H>  /  ALT  400<H>  /  AlkPhos  103  01-22    PT/INR - ( 22 Jan 2023 06:00 )   PT: 19.5 sec;   INR: 1.67 ratio         PTT - ( 22 Jan 2023 06:00 )  PTT:33.2 sec

## 2023-01-23 NOTE — DIETITIAN INITIAL EVALUATION ADULT - PERTINENT LABORATORY DATA
01-23    144  |  113<H>  |  48<H>  ----------------------------<  146<H>  3.6   |  26  |  1.48<H>    Ca    8.1<L>      23 Jan 2023 07:30    TPro  6.6  /  Alb  2.4<L>  /  TBili  1.3<H>  /  DBili  0.8<H>  /  AST  204<H>  /  ALT  400<H>  /  AlkPhos  103  01-22

## 2023-01-24 NOTE — PROGRESS NOTE ADULT - SUBJECTIVE AND OBJECTIVE BOX
NEPHROLOGY INTERVAL HPI/OVERNIGHT EVENTS:  23 @ 09:27    \   intubated, sedation to be weaned off, oxygenating well + uop    intubated and sedated, remains on pressors    intubated yesterday on precedex and pressors   pt with episode of emesis with hypoxia, concern for aspiration with adjustment of his ativan dose.  wife at bedside.  unofficial echo with ef of 15%  HPI: Hx obtained from ex wife.  66 yo man with PMHX of HTN,( on Losartan), hx of asthma, living alone, brought to Ed when found to be confused with hallucinations.  Per pt's wife, pt was well until a few days ago.  Yesterday noted with slurred speech and confusion and relates ETOH hx of drinking vodka/screwdriver 4-5 x/day.  On Losartan for HTN but has not seen PMD since pandemic.  No known hx of CKD.  Creat 2.76  and abnormal LFTs.  Urine positive for Cocaine and Benzo, though pt adamantly  denies  illicit drug use  CT non contrast with no intra abdominal pathology, positive for small bilateral pleural effusion with cardiomegaly.    PMHX and PSHX.  --HTN  --Thyroid goiter --post Thyroidectomy  --Post intranasal polyp resection.  --Hx of Asthma        MEDICATIONS  (STANDING):  allopurinol 200 milliGRAM(s) Oral daily  chlordiazePOXIDE 25 milliGRAM(s) Oral four times a day  chlorhexidine 0.12% Liquid 15 milliLiter(s) Oral Mucosa every 12 hours  chlorhexidine 4% Liquid 1 Application(s) Topical <User Schedule>  dexMEDEtomidine Infusion 0.6 MICROgram(s)/kG/Hr (16.3 mL/Hr) IV Continuous <Continuous>  folic acid 1 milliGRAM(s) Oral daily  heparin   Injectable 5000 Unit(s) SubCutaneous every 8 hours  lactulose Syrup 20 Gram(s) Oral every 6 hours  levothyroxine 100 MICROGram(s) Oral daily  midodrine 10 milliGRAM(s) Oral every 8 hours  norepinephrine Infusion 0.1 MICROgram(s)/kG/Min (20.4 mL/Hr) IV Continuous <Continuous>  pantoprazole  Injectable 40 milliGRAM(s) IV Push daily  piperacillin/tazobactam IVPB.. 3.375 Gram(s) IV Intermittent every 8 hours  rifAXIMin 550 milliGRAM(s) Oral two times a day    MEDICATIONS  (PRN):  albuterol    90 MICROgram(s) HFA Inhaler 2 Puff(s) Inhalation every 6 hours PRN Shortness of Breath  ondansetron Injectable 4 milliGRAM(s) IV Push every 8 hours PRN Nausea and/or Vomiting  sodium chloride 0.9% lock flush 10 milliLiter(s) IV Push every 1 hour PRN Pre/post blood products, medications, blood draw, and to maintain line patency      Allergies    No Known Allergies    Intolerances        I&O's Detail    2023 07:01  -  2023 07:00  --------------------------------------------------------  IN:    Dexmedetomidine: 314 mL    IV PiggyBack: 250 mL    Nepro with Carb Steady: 440 mL    Norepinephrine: 98 mL  Total IN: 1102 mL    OUT:    Indwelling Catheter - Urethral (mL): 1450 mL  Total OUT: 1450 mL    Total NET: -348 mL          .    Patient was seen and evaluated on dialysis.   Patient is tolerating the procedure well.   Continue dialysis:   Dialyzer:          QB:        QD:   Goal UF ___ over ___ Hours       Vital Signs Last 24 Hrs  T(C): 37.3 (2023 00:00), Max: 37.3 (2023 00:00)  T(F): 99.1 (2023 00:00), Max: 99.1 (2023 00:00)  HR: 82 (2023 08:00) (72 - 97)  BP: --  BP(mean): --  RR: 32 (2023 08:00) (19 - 33)  SpO2: 99% (2023 08:00) (96% - 100%)    Parameters below as of 2023 08:00  Patient On (Oxygen Delivery Method): ventilator    O2 Concentration (%): 40  Daily     Daily Weight in k.8 (2023 05:00)    PHYSICAL EXAM:  General: alert. awake Ox3  HEENT: MMM  CV: s1s2 rrr  LUNGS: B/L CTA  EXT: no edema    LABS:        146<H>  |  114<H>  |  45<H>  ----------------------------<  157<H>  4.0   |  26  |  1.54<H>    Ca    8.1<L>      2023 06:45

## 2023-01-24 NOTE — PROGRESS NOTE ADULT - SUBJECTIVE AND OBJECTIVE BOX
Patient is a 67y old  Male who presents with a chief complaint of Altered mental status. (23 Jan 2023 20:39)      BRIEF HOSPITAL COURSE: 68 y/o male with pmhx of polysubstance abuse, etoh abuse, goiter s/p thyroidectomy, HTN, HLD admitted on 1/18 with hallucinations due to etoh withdrawal found to have severe LV dysfunction and AMARJIT of unclear duration transferred to CCU on 1/20 with mixed respiratory failure due to aspiration pneumonitis and worsening mental status requiring intubation. post intubation c/b shock requiring pressors.     Events last 24 hours: Remains on pressors and full mechanical support, Librium taper with precedex    PAST MEDICAL & SURGICAL HISTORY:  Intranasal mass      Hypertension, unspecified type      Hyperlipidemia, unspecified hyperlipidemia type      Thyroid nodule  left thyroidectomy      Asthma      H/O partial thyroidectomy  2008      S/P T&amp;A (status post tonsillectomy and adenoidectomy)  1965          Review of Systems:  ISREAL due to clinical condition    Medications:  piperacillin/tazobactam IVPB.. 3.375 Gram(s) IV Intermittent every 8 hours  rifAXIMin 550 milliGRAM(s) Oral two times a day    hydrALAZINE Injectable 10 milliGRAM(s) IV Push three times a day PRN  midodrine 10 milliGRAM(s) Oral every 8 hours  norepinephrine Infusion 0.1 MICROgram(s)/kG/Min IV Continuous <Continuous>    albuterol    90 MICROgram(s) HFA Inhaler 2 Puff(s) Inhalation every 6 hours PRN    chlordiazePOXIDE 25 milliGRAM(s) Oral four times a day  dexMEDEtomidine Infusion 0.6 MICROgram(s)/kG/Hr IV Continuous <Continuous>  LORazepam   Injectable 2 milliGRAM(s) IV Push every 4 hours PRN  ondansetron Injectable 4 milliGRAM(s) IV Push every 8 hours PRN      heparin   Injectable 5000 Unit(s) SubCutaneous every 8 hours    lactulose Syrup 20 Gram(s) Oral every 6 hours  pantoprazole  Injectable 40 milliGRAM(s) IV Push daily      allopurinol 200 milliGRAM(s) Oral daily  levothyroxine 100 MICROGram(s) Oral daily    folic acid 1 milliGRAM(s) Oral daily  sodium chloride 0.9% lock flush 10 milliLiter(s) IV Push every 1 hour PRN      chlorhexidine 0.12% Liquid 15 milliLiter(s) Oral Mucosa every 12 hours  chlorhexidine 4% Liquid 1 Application(s) Topical <User Schedule>        Mode: AC/ CMV (Assist Control/ Continuous Mandatory Ventilation)  RR (machine): 22  TV (machine): 500  FiO2: 40  PEEP: 5  PIP: 17      ICU Vital Signs Last 24 Hrs  T(C): 37.3 (24 Jan 2023 00:00), Max: 37.7 (23 Jan 2023 05:00)  T(F): 99.1 (24 Jan 2023 00:00), Max: 99.8 (23 Jan 2023 05:00)  HR: 84 (24 Jan 2023 03:00) (72 - 97)  BP: --  BP(mean): --  ABP: 104/67 (24 Jan 2023 03:00) (89/56 - 114/64)  ABP(mean): 81 (24 Jan 2023 03:00) (68 - 85)  RR: 24 (24 Jan 2023 03:00) (19 - 31)  SpO2: 100% (24 Jan 2023 03:00) (96% - 100%)    O2 Parameters below as of 24 Jan 2023 01:00  Patient On (Oxygen Delivery Method): ventilator                I&O's Detail    22 Jan 2023 07:01  -  23 Jan 2023 07:00  --------------------------------------------------------  IN:    Dexmedetomidine: 457 mL    Dexmedetomidine: 391 mL    IV PiggyBack: 1150 mL    Nepro with Carb Steady: 920 mL    Norepinephrine: 245 mL  Total IN: 3163 mL    OUT:    Indwelling Catheter - Urethral (mL): 1925 mL  Total OUT: 1925 mL    Total NET: 1238 mL      23 Jan 2023 07:01  -  24 Jan 2023 03:29  --------------------------------------------------------  IN:    Dexmedetomidine: 314 mL    IV PiggyBack: 250 mL    Nepro with Carb Steady: 440 mL    Norepinephrine: 98 mL  Total IN: 1102 mL    OUT:    Indwelling Catheter - Urethral (mL): 1450 mL  Total OUT: 1450 mL    Total NET: -348 mL          LABS:    01-23    144  |  113<H>  |  48<H>  ----------------------------<  146<H>  3.6   |  26  |  1.48<H>    Ca    8.1<L>      23 Jan 2023 07:30    TPro  6.6  /  Alb  2.4<L>  /  TBili  1.3<H>  /  DBili  0.8<H>  /  AST  204<H>  /  ALT  400<H>  /  AlkPhos  103  01-22          CAPILLARY BLOOD GLUCOSE        PT/INR - ( 22 Jan 2023 06:00 )   PT: 19.5 sec;   INR: 1.67 ratio         PTT - ( 22 Jan 2023 06:00 )  PTT:33.2 sec    CULTURES:  Culture Results:   Normal Respiratory Citlalli present (01-21 @ 10:49)      Physical Examination    General: no distress    HEENT: Pupils equal, reactive to light. Symmetric. No scleral icterus or injection.    PULM: Clear to auscultation B/L. No wheezes, rales, or rhonchi apprecaited. No significant sputum production or increased respiratory effort.    NECK: Supple, no lymphadenopathy, trachea midline.    CVS: Regular rate and rhythm, no murmurs appreciated, +s1/s2.    ABD: Soft, nondistended, nontender, normoactive bowel sounds.    EXT: No edema, nontender.    SKIN: Warm and well perfused, no rashes noted.    NEURO: Sedated    RADIOLOGY: < from: Xray Chest 1 View-PORTABLE IMMEDIATE (Xray Chest 1 View-PORTABLE IMMEDIATE .) (01.20.23 @ 15:30) >  ACC: [id number] EXAM:  XR CHEST PORTABLE URGENT 1V   ORDERED BY: DARBY CHARLES     ACC: [id number] EXAM:  XR CHEST PORTABLE IMMED 1V   ORDERED BY: MAYANK COOK     PROCEDURE DATE:  01/20/2023          INTERPRETATION:  INDICATION: Aspiration    COMPARISON: 1/18/2023    Portable chest 1/20/2023 at 2:22 PM    FINDINGS:  Heart/Vascular:Moderate cardiomegaly. The aorta, mediastinum and hilum   appear prominent which may be projectional.  Pulmonary: Midline trachea. There are small to moderate bilateral pleural   effusions with lower lobe consolidations. No pulmonary venous congestion   or pneumothorax.  Bones: There is no fracture. Degenerative changes of the spine.  Lines and catheter: None    Portable chest 1/20/2023 at 3:22 PM      Findings: ET tube mid trachea. Tip of left subclavian venous catheter in   SVC. NG tube placed. Tip beyond field-of-view.  No pneumothorax. The right effusion appears smaller which is likely   technical in nature. No change in appearance of the moderate left   effusion and lower lobe consolidation. Cardiomegaly.    Impression:    There are small to moderate bilateral pleural effusions with lower lobe   consolidations.    No pulmonary venous congestion or pneumothorax.    Support lines and catheters as above.    --- End of Report ---            MILEY HICKS DO; Attending Radiologist  This document has been electronically signed. Jan 20 2023  3:47PM    < end of copied text >

## 2023-01-24 NOTE — PROGRESS NOTE ADULT - ASSESSMENT
68 y/o male with pmhx of polysubstance abuse, etoh abuse, goiter s/p thyroidectomy, HTN, HLD admitted on 1/18 with hallucinations due to etoh withdrawal found to have severe LV dysfunction and AMARJIT of unclear duration transferred to CCU on 1/20 with mixed respiratory failure due to aspiration pneumonitis and worsening mental status requiring intubation. post intubation c/b shock requiring pressors.     # Toxic Metabolic Encephalopathy, Drug induced  # ETOH withdrawal with hallucinations  # ETOH Abuse  # Acute Mixed Hypoxic-hypercarbic respiratory Failure  # Aspiration Pneumonitis  # Hyperammonemia  # Distributive Shock    Neuro:  - Thiamine 100 mg IV daily  - Folic Acid 1 mg IV daily  - End tidal CO2 monitoring  - Precedex gtt as adjunct therapy to Librium Taper, prn Ativan IVP  - Lactulose, rifaximin, ammonia 67    CV:  - actively titrating Levo to maintain MAP >65, midodrine to help fascilitate weaning off pressors  - EF 15-20%, avoid fluid overload, cardio following    Pulm:  - Low TV lung protective strategies 4-6mL/kg,  - will utilize PEEP for alveolar recruitment is pt desats  - Actively titrating ventilator settings to maintain SpO2 > 92%,  - Vent Bundle in SITU  - End Tidal CO2 monitoring                  GI:  - Cont IV Protonix 40mg IVP Daily,  - Enteral feeds as tolerated to avoid Stress ulceration and optimize nutritional state as pt is increased risk of refeeding syndrome.    Renal:  - Even to net negative fluid balance as tolerated by hemodynamics and renal fx.    - renal indices improving, Continue to monitor Bun/Cr and UOP  - Replacing electrolytes as needed with Goal K> 4, PO> 3, Mg> 2               - Strict I&O's  - Avoid Nephro toxic medication  - Renally dose meds    Heme:  - Heparin for dvt prophylaxis    ID:  - Zosyn for aspiration pneumonitis  - Microbiology and Radiology reviewed   - trend CBC with diff, CMP  and fever curve    Endo:  - ISS for aggressive glycemic control to limit FS glucose to < 180mg/dl.  - Keep Euthyroid    Critical Care Time:  40 minutes were spent assessing the patient's presenting problems of acute illness that pose a high probability of life threatening  deterioration or end organ damage / dysfunction.  Medical desicion making includes initiation / continuation of plan or care review data/ labwork/ radiographic study, direct patient care bedside ,  discussions with  consultants regarding care,  evaluation and interpretation of vital signs, any necessary ventilator management,   NIV or BIPAP changes  or initiation,    discussions with multidisipliary team,  am or pm rounds, discussions of goals of care with patient and family all non-inclusive of procedures.   66 y/o male with pmhx of polysubstance abuse, etoh abuse, goiter s/p thyroidectomy, HTN, HLD admitted on 1/18 with hallucinations due to etoh withdrawal found to have severe LV dysfunction and AMARJIT of unclear duration transferred to CCU on 1/20 with mixed respiratory failure due to aspiration pneumonitis and worsening mental status requiring intubation. post intubation c/b shock requiring pressors.     # Toxic Metabolic Encephalopathy, Drug induced  # ETOH withdrawal with hallucinations  # ETOH Abuse  # Acute Mixed Hypoxic-hypercarbic respiratory Failure  # Aspiration Pneumonitis  # Hyperammonemia  # Distributive Shock    Neuro:  - Folic Acid 1 mg daily, s/p Thiamine   - Precedex gtt as adjunct therapy to Librium Taper, prn Ativan IVP  - Lactulose, rifaximin, ammonia 67    CV:  - actively titrating Levo to maintain MAP >65, midodrine to help fascilitate weaning off pressors  - EF 15-20%, avoid fluid overload, cardio following    Pulm:  - Low TV lung protective strategies 4-6mL/kg,  - will utilize PEEP for alveolar recruitment is pt desats  - Actively titrating ventilator settings to maintain SpO2 > 92%,  - Vent Bundle in SITU  - End Tidal CO2 monitoring                  GI:  - Cont IV Protonix 40mg IVP Daily,  - Enteral feeds as tolerated to avoid Stress ulceration and optimize nutritional state as pt is increased risk of refeeding syndrome.    Renal:  - Even to net negative fluid balance as tolerated by hemodynamics and renal fx.    - renal indices improving, Continue to monitor Bun/Cr and UOP  - Replacing electrolytes as needed with Goal K> 4, PO> 3, Mg> 2               - Strict I&O's  - Avoid Nephro toxic medication  - Renally dose meds    Heme:  - Heparin for dvt prophylaxis    ID:  - Zosyn for aspiration pneumonitis  - Microbiology and Radiology reviewed   - trend CBC with diff, CMP  and fever curve    Endo:  - ISS for aggressive glycemic control to limit FS glucose to < 180mg/dl.  - Keep Euthyroid    Critical Care Time:  40 minutes were spent assessing the patient's presenting problems of acute illness that pose a high probability of life threatening  deterioration or end organ damage / dysfunction.  Medical desicion making includes initiation / continuation of plan or care review data/ labwork/ radiographic study, direct patient care bedside ,  discussions with  consultants regarding care,  evaluation and interpretation of vital signs, any necessary ventilator management,   NIV or BIPAP changes  or initiation,    discussions with multidisipliary team,  am or pm rounds, discussions of goals of care with patient and family all non-inclusive of procedures.

## 2023-01-24 NOTE — CONSULT NOTE ADULT - ASSESSMENT
67 yr old with PMHx of polysubstance abuse, etoh abuse, goiter s/p thyroidectomy, HTN, HLD admitted on 1/18 with hallucinations due to etoh withdrawal found to have severe LV dysfunction and AMARJIT of unclear duration, transferred to CCU on 1/20 with mixed respiratory failure due to aspiration pneumonitis and worsening mental status requiring intubation. Post intubation c/b shock requiring pressors.  LVEF 15-20%.  Brief runs of SVT and NSVT on telemetry.      A/P: Acute hypoxic resp failure, drug induced encephalopathy, acute on chronic HFrEF 15-20%, PSVT and NSVT  Continue tele monitoring  Wean Levophed and Midodrine as tolerated.  When pt is stable consider ischemic workup  Avoid AV chris blockers with hypotension.  LVEF 15-20%  GDMT when stable per cardiology  Keep lytes repleted K>4, Mg>2  EP will follow  Discussed with RN, Dr. Doe.

## 2023-01-24 NOTE — PROGRESS NOTE ADULT - SUBJECTIVE AND OBJECTIVE BOX
66 y/o male with pmhx of polysubstance abuse, etoh abuse, goiter s/p thyroidectomy, HTN, HLD admitted on 1/18 with hallucinations due to etoh withdrawal found to have severe LV dysfunction and AMARJIT of unclear duration transferred to CCU on 1/20 with mixed respiratory failure due to aspiration pneumonitis and worsening mental status requiring intubation. post intubation c/b shock requiring pressors.   Case discussed on CCU round and with the family.  1/24  Cased discussed on CCU rounds, SBT ongoing      ICU Vital Signs Last 24 Hrs  T(C): 37.3 (24 Jan 2023 00:00), Max: 37.3 (24 Jan 2023 00:00)  T(F): 99.1 (24 Jan 2023 00:00), Max: 99.1 (24 Jan 2023 00:00)  HR: 85 (24 Jan 2023 11:00) (72 - 97)  ABP: 102/64 (24 Jan 2023 11:00) (89/56 - 114/64)  ABP(mean): 77 (24 Jan 2023 11:00) (68 - 85)  RR: 33 (24 Jan 2023 11:00) (19 - 33)  SpO2: 100% (24 Jan 2023 11:00) (96% - 100%)    O2 Parameters below as of 24 Jan 2023 08:00  Patient On (Oxygen Delivery Method): ventilator    O2 Concentration (%): 40      01-24    146<H>  |  114<H>  |  45<H>  ----------------------------<  157<H>  4.0   |  26  |  1.54<H>    Ca    8.1<L>      24 Jan 2023 06:45  Phos  2.5     01-24  Mg     2.0     01-24    TPro  6.7  /  Alb  2.2<L>  /  TBili  1.1  /  DBili  0.7<H>  /  AST  117<H>  /  ALT  210<H>  /  AlkPhos  103  01-24    LIVER FUNCTIONS - ( 24 Jan 2023 10:45 )  Alb: 2.2 g/dL / Pro: 6.7 gm/dL / ALK PHOS: 103 U/L / ALT: 210 U/L / AST: 117 U/L / GGT: x             PT/INR - ( 24 Jan 2023 10:45 )   PT: 16.6 sec;   INR: 1.43 ratio         PTT - ( 24 Jan 2023 10:45 )  PTT:35.3 sec

## 2023-01-24 NOTE — PROGRESS NOTE ADULT - ASSESSMENT
68 yo man with HTN and known ETOH abuse admitted with AMS.  Evidence of cocaine use though pt denies.  --AMARJIT with unclear baseline function: in the setting of recent Cocaine use and ARB.    Continue to hold ARB for now.    D/c further IVF for now.  --Continue to monitor renal function and LFTS.  Continue to trend CPK.  --check repeat ammonia level.  --AMS : due to illicit drugs and ETOH.  --LE edema : check urine protein and Echo, franklin with ETOH hx.    1/20 MK   - AMARJIT improving     monitor off ivf     off arb    unclear if with component of ckd   - CHF with ef of 15%  - hypoxia with recent aspiration   - ? asymptomatic hyperuricemia : fu trend     and start allopurinol   marcie bauer    1/21 MK   - AMARJIT improving     monitor off ivf     off arb    unclear if with component of ckd   - CHF with ef of 15%  - hypoxia with recent aspiration, no intubated     on zosyn renally dosed  - ? cirrhosis on rifampin   - ? asymptomatic hyperuricemia : fu trend      allopurinol started     1/22 MK   - AMARJIT improving     monitor off ivf     off arb    unclear if with component of ckd   - CHF with ef of 15%  - hypoxia with recent aspiration, no intubated     on zosyn renally dosed  - ? ETOH cirrhosis on rifampin and lactuose  - ? asymptomatic hyperuricemia : fu trend      allopurinol started, fu levels today   - FLC elevated, check MOSES send     1/23 MK   - AMARJIT improving     monitor off ivf     off arb    unclear if with component of ckd   - CHF with ef of 15%  - hypoxia with recent aspiration, no intubated     on zosyn renally dosed    planned for weaning of sedation   - ? ETOH cirrhosis on rifampin and lactuose  - ? asymptomatic hyperuricemia : fu trend      allopurinol started, fu levels today      will inc dose of allopurinol further   - FLC elevated, check MOSES send     does not require 24 hour collection   marcie laird    68 yo man with HTN and known ETOH abuse admitted with AMS.  Evidence of cocaine use though pt denies.  --AMARJIT with unclear baseline function: in the setting of recent Cocaine use and ARB.    Continue to hold ARB for now.    D/c further IVF for now.  --Continue to monitor renal function and LFTS.  Continue to trend CPK.  --check repeat ammonia level.  --AMS : due to illicit drugs and ETOH.  --LE edema : check urine protein and Echo, franklin with ETOH hx.    1/20 MK   - AMARJIT improving     monitor off ivf     off arb    unclear if with component of ckd   - CHF with ef of 15%  - hypoxia with recent aspiration   - ? asymptomatic hyperuricemia : fu trend     and start allopurinol   dw dr bauer    1/21 MK   - AMARJIT improving     monitor off ivf     off arb    unclear if with component of ckd   - CHF with ef of 15%  - hypoxia with recent aspiration, no intubated     on zosyn renally dosed  - ? cirrhosis on rifampin   - ? asymptomatic hyperuricemia : fu trend      allopurinol started     1/22 MK   - AMARJIT improving     monitor off ivf     off arb    unclear if with component of ckd   - CHF with ef of 15%  - hypoxia with recent aspiration, no intubated     on zosyn renally dosed  - ? ETOH cirrhosis on rifampin and lactuose  - ? asymptomatic hyperuricemia : fu trend      allopurinol started, fu levels today   - FLC elevated, check MOSES send     1/23 MK   - AMARJIT improving     monitor off ivf     off arb    unclear if with component of ckd   - CHF with ef of 15%  - hypoxia with recent aspiration, now intubated     on zosyn renally dosed    planned for weaning of sedation   - ? ETOH cirrhosis on rifampin and lactuose  - ? asymptomatic hyperuricemia : fu trend      allopurinol started, fu levels today      will inc dose of allopurinol further   - FLC elevated, check MOSES send     does not require 24 hour collection   dw rn     1/24 MK   - AMARJIT improving and leveling off ? ckd at baseline   - CHF with ef of 15%  - hypoxia with recent aspiration, now intubated     on zosyn renally dosed    remains on sedation   - ? ETOH cirrhosis on rifampin and lactuose  - ? asymptomatic hyperuricemia : fu trend      allopurinol started,uric acid dec   - FLC elevated, check MOSES send     does not require 24 hour collection     will repeat FLC ordered when in AMARJIT

## 2023-01-24 NOTE — PROGRESS NOTE ADULT - SUBJECTIVE AND OBJECTIVE BOX
CHIEF COMPLAINT: found to have AMS     HPI:   67 year old male with PMHx of HTN, Thyroid Goiter s/p thyroidectomy, HLD BIBEMS from home with AMS. Pt's ex wife concerned about pt reporting that he has been acting strange at home and that he has been saying strange things to their 11 year old son. Pt reports that he is depressed and that he owes money to the [employer]. As per EMS pt is on xanax, Ambien and melatonin. Pt denies fever but endorses difficulty breathing, seeing things that other people do not. Pt has not taken xanax in 3 or 4 days. Pt drinks EtOH but denies withdrawal symptoms, non-smoker. Denies SI. Ex wife states speech was slurred. Patient was admitted with altered mental status. Patient was seen in the room and doing better. Patient knows that he is in hospital. Denies any complaints of chest pain, shortness of breath, headache, dizziness, nausea, vomiting or abdominal pain. No fall or head injury. Denies using any drugs.     1/20/23 Patient  was receiving sedation , did vomit this morning , subsequent worsening of hypoxia , patient was intubated , patient noted to have severe ventricular systolic dysfunction , dilated aorta , as per his ex wife , patient was not seeing any physician for several  years , patient does drink regularly   patient cxr at the time of admission ,showed no evidence of pleural effusion , however todays CXR showed bilateral pleural effusion ,   1/21/23 Patient remain intubated , sedated , mild hypotensive maintained with pressor low dose , improving oxygenation ,   improving renal , LFTS   1/22/23 Patient is remain same  , on lower dose of pressor , oxygen 40% intubated   1/23/'23: intubated sedated.  1/24/23 Patient is remain intubated , decreasing oxygen requirement ,  on pressor , monitor showed brief SVT episodesx 2 ,      and one episode of NSVT      MEDICATIONS:  OUTPATIENT  Home Medications:  ALPRAZolam 1 mg oral tablet: 1 tab(s) orally every 8 hours, As Needed - for anxiety (19 Jan 2023 09:53)  losartan 100 mg oral tablet: 1 tab(s) orally once a day (in the morning) (19 Jan 2023 09:53)  Multiple Vitamins oral tablet: 1 tab(s) orally once a day. last dose 2/ 12/ 2019 (19 Jan 2023 09:53)  rosuvastatin 5 mg oral tablet: 1 tab(s) orally once a day (in the morning) (19 Jan 2023 09:53)  sildenafil 100 mg oral tablet: 1 tab(s) orally once a day, As Needed (19 Jan 2023 09:53)  Synthroid 100 mcg (0.1 mg) oral tablet: 1 tab(s) orally once a day (in the morning) (19 Jan 2023 09:53)  Ventolin HFA 90 mcg/inh inhalation aerosol: 2 puff(s) inhaled 4 times a day, As Needed (19 Jan 2023 09:53)  zolpidem 10 mg oral tablet: 1 tab(s) orally once a day (at bedtime), As Needed (19 Jan 2023 09:53)        MEDICATIONS  (STANDING):  allopurinol 200 milliGRAM(s) Oral daily  chlordiazePOXIDE 25 milliGRAM(s) Oral four times a day  chlorhexidine 0.12% Liquid 15 milliLiter(s) Oral Mucosa every 12 hours  chlorhexidine 4% Liquid 1 Application(s) Topical <User Schedule>  dexMEDEtomidine Infusion 0.6 MICROgram(s)/kG/Hr (16.3 mL/Hr) IV Continuous <Continuous>  folic acid 1 milliGRAM(s) Oral daily  heparin   Injectable 5000 Unit(s) SubCutaneous every 8 hours  lactulose Syrup 20 Gram(s) Oral every 6 hours  levothyroxine 100 MICROGram(s) Oral daily  midodrine 10 milliGRAM(s) Oral every 8 hours  norepinephrine Infusion 0.1 MICROgram(s)/kG/Min (20.4 mL/Hr) IV Continuous <Continuous>  pantoprazole  Injectable 40 milliGRAM(s) IV Push daily  piperacillin/tazobactam IVPB.. 3.375 Gram(s) IV Intermittent every 8 hours  rifAXIMin 550 milliGRAM(s) Oral two times a day    MEDICATIONS  (PRN):  albuterol    90 MICROgram(s) HFA Inhaler 2 Puff(s) Inhalation every 6 hours PRN Shortness of Breath  ondansetron Injectable 4 milliGRAM(s) IV Push every 8 hours PRN Nausea and/or Vomiting  sodium chloride 0.9% lock flush 10 milliLiter(s) IV Push every 1 hour PRN Pre/post blood products, medications, blood draw, and to maintain line patency    Vital Signs Last 24 Hrs  T(C): 37.3 (24 Jan 2023 00:00), Max: 37.3 (24 Jan 2023 00:00)  T(F): 99.1 (24 Jan 2023 00:00), Max: 99.1 (24 Jan 2023 00:00)  HR: 82 (24 Jan 2023 08:00) (72 - 97)  BP: --  BP(mean): --  RR: 32 (24 Jan 2023 08:00) (19 - 33)  SpO2: 99% (24 Jan 2023 08:00) (96% - 100%)    Parameters below as of 24 Jan 2023 08:00  Patient On (Oxygen Delivery Method): ventilator    O2 Concentration (%): 40    I&O's Summary    23 Jan 2023 07:01  -  24 Jan 2023 07:00  --------------------------------------------------------  IN: 1102 mL / OUT: 1450 mL / NET: -348 mL                PHYSICAL EXAM:    Constitutional: NAD, intubate obese   HEENT: PERR, EOMI,  No oral cyananosis.  Neck:  supple,  No JVD  Respiratory: Breath sounds are clear bilaterally, No wheezing, rales or rhonchi  Cardiovascular: S1 and S2, regular tachycardic  and rhythm, no Murmurs, gallops or rubs  Gastrointestinal: Bowel Sounds present, soft, nontender.   Extremities: No peripheral edema. No clubbing or cyanosis.  Vascular: 2+ peripheral pulses  Neurological: intubated   Musculoskeletal: no calf tenderness.  Skin: No rashes.        ===============================  ===============================  LABS:         01-24    146<H>  |  114<H>  |  45<H>  ----------------------------<  157<H>  4.0   |  26  |  1.54<H>    Ca    8.1<L>      24 Jan 2023 06:45                01-20 Chol 76 LDL -- HDL 15<L> Trig 75  Culture Results:   Normal Respiratory Citlalli present (01-21-23 @ 10:49)           ===============================  ===============================        < from: 12 Lead ECG (01.18.23 @ 20:57) >  Sinus tachycardia with Premature supraventricular complexes  Pulmonary disease pattern  Septal infarct , age undetermined  Abnormal ECG  When compared with ECG of 12-FEB-2019 11:55,  Premature supraventricular complexes are now Present  Vent. rate has increased BY44 BPM  Nonspecific T wave abnormality, worse in Inferior leads  T wave inversion more evident in Lateral leads  Confirmed by MD ALVARADO PAUL ([id number]) on 1/19/2023 9:01:18 AM    < end of copied text >  < from: Xray Chest 1 View-PORTABLE IMMEDIATE (Xray Chest 1 View-PORTABLE IMMEDIATE .) (01.20.23 @ 15:30) >    Impression:    There are small to moderate bilateral pleural effusions with lower lobe   consolidations.    No pulmonary venous congestion or pneumothorax.    Support lines and catheters as above.    --- End of Report ---    < end of copied text >  < from: CT Abdomen and Pelvis No Cont (01.18.23 @ 20:13) >  LOWER CHEST: Small bilateral pleural effusions and partial compressive   atelectasis of both lower lobes. Cardiomegaly.    < end of copied text >  < from: CT Abdomen and Pelvis No Cont (01.18.23 @ 20:13) >    IMPRESSION:  No acute intra-abdominal pathology.    Small volume abdominopelvic ascites.    --- End of Report ---    < end of copied text >      Blood Culture:     01-19 @ 09:48  TSH: 6.30  01-18 @ 18:26  TSH: 7.76      < from: TTE Echo Complete w/o Contrast w/ Doppler (01.20.23 @ 11:00) >     Normal appearing mitral valve structure and function.   Mild mitral annular calcification is present.   Mild (1+) mitral regurgitation is present.   Normal aortic valve structure and function.   The ascending aorta and aortic root are dilated.   At least Mild (1+) aortic regurgitation is present.   Normal appearing tricuspid valve structure.   At least Mild (1+) tricuspid valve regurgitation is present.   Normal appearing pulmonic valve structure and function.   The left atrium is mildly dilated.   Severely reduced left ventricular systolic function.   The left ventricle cavity is dilated.   Estimated left ventricular ejection fraction is 15-20 %.   Dr. Will aware of study findings.   The right atrium appears mildly dilated.   Normal appearing right ventricle function.   The IVC is dilated with decreased respiratory variation.     Recommendation    < end of copied text >  < from: Xray Chest 1 View-PORTABLE IMMEDIATE (Xray Chest 1 View-PORTABLE IMMEDIATE .) (01.20.23 @ 15:30) >  There are small to moderate bilateral pleural effusions with lower lobe   consolidations.    No pulmonary venous congestion or pneumothorax.    Support lines and catheters as above.    < end of copied text >        ===============================

## 2023-01-24 NOTE — CONSULT NOTE ADULT - SUBJECTIVE AND OBJECTIVE BOX
HPI:   67 year old male with PMHx of HTN, Thyroid Goiter s/p thyroidectomy, HLD BIBEMS from home with AMS. Pt's ex wife concerned about pt reporting that he has been acting strange at home and that he has been saying strange things to their 11 year old son. Pt reports that he is depressed and that he owes money to the IRS. As per EMS pt is on xanax, Ambien and melatonin. Pt denies fever but endorses difficulty breathing, seeing things that other people do not. Pt has not taken xanax in 3 or 4 days. Pt drinks EtOH but denies withdrawal symptoms, non-smoker. Denies SI. Ex wife states speech was slurred. Patient was admitted with altered mental status. Patient was seen in the room and doing better. Patient knows that he is in hospital. Denies any complaints of chest pain, shortness of breath, headache, dizziness, nausea, vomiting or abdominal pain. No fall or head injury. Denies using any drugs.       1/23/24:  EP asked to evaluate this pt          PAST MEDICAL & SURGICAL HISTORY:  Intranasal mass  Hypertension, unspecified type  Hyperlipidemia, unspecified hyperlipidemia type  Thyroid nodule  left thyroidectomy  Asthma  H/O partial thyroidectomy  2008  S/P T&A (status post tonsillectomy and adenoidectomy)  1965      SOCIAL HISTORY: Denies tobacco, etoh abuse or illicit drug use    FAMILY HISTORY:        MEDICATIONS  (STANDING):  allopurinol 200 milliGRAM(s) Oral daily  chlordiazePOXIDE 25 milliGRAM(s) Oral four times a day  chlorhexidine 0.12% Liquid 15 milliLiter(s) Oral Mucosa every 12 hours  chlorhexidine 4% Liquid 1 Application(s) Topical <User Schedule>  dexMEDEtomidine Infusion 0.6 MICROgram(s)/kG/Hr (16.3 mL/Hr) IV Continuous <Continuous>  folic acid 1 milliGRAM(s) Oral daily  heparin   Injectable 5000 Unit(s) SubCutaneous every 8 hours  lactulose Syrup 20 Gram(s) Oral every 6 hours  levothyroxine 100 MICROGram(s) Oral daily  midodrine 10 milliGRAM(s) Oral every 8 hours  norepinephrine Infusion 0.1 MICROgram(s)/kG/Min (20.4 mL/Hr) IV Continuous <Continuous>  pantoprazole  Injectable 40 milliGRAM(s) IV Push daily  piperacillin/tazobactam IVPB.. 3.375 Gram(s) IV Intermittent every 8 hours  rifAXIMin 550 milliGRAM(s) Oral two times a day    MEDICATIONS  (PRN):  albuterol    90 MICROgram(s) HFA Inhaler 2 Puff(s) Inhalation every 6 hours PRN Shortness of Breath  ondansetron Injectable 4 milliGRAM(s) IV Push every 8 hours PRN Nausea and/or Vomiting  sodium chloride 0.9% lock flush 10 milliLiter(s) IV Push every 1 hour PRN Pre/post blood products, medications, blood draw, and to maintain line patency      Allergies    No Known Allergies    Intolerances          Vital Signs Last 24 Hrs  T(C): 37.3 (24 Jan 2023 00:00), Max: 37.3 (24 Jan 2023 00:00)  T(F): 99.1 (24 Jan 2023 00:00), Max: 99.1 (24 Jan 2023 00:00)  HR: 92 (24 Jan 2023 12:00) (72 - 97)  BP: --  BP(mean): --  RR: 27 (24 Jan 2023 12:00) (19 - 33)  SpO2: 100% (24 Jan 2023 12:00) (96% - 100%)    Parameters below as of 24 Jan 2023 08:00  Patient On (Oxygen Delivery Method): ventilator    O2 Concentration (%): 40      REVIEW OF SYSTEMS:  unable to obtain secondary to pt condition        PHYSICAL EXAMINATION:    GENERAL APPEARANCE: Intubated and sedated  HEENT:  Pupils are normal and react normally. No icterus. Mucous membranes well colored.  NECK:  Supple. No lymphadenopathy. Jugular venous pressure not elevated. Carotids equal.   HEART:   The cardiac impulse has a normal quality. There are no murmurs, rubs or gallops noted  CHEST:  Chest is clear to auscultation. Normal respiratory effort.  ABDOMEN:  Soft and nontender.   EXTREMITIES:  +3 edema bilat upper and lower extremities  SKIN:  No rash or significant lesions are noted.    I&O's Summary    23 Jan 2023 07:01  -  24 Jan 2023 07:00  --------------------------------------------------------  IN: 1102 mL / OUT: 1450 mL / NET: -348 mL        LABS:    01-24    146<H>  |  114<H>  |  45<H>  ----------------------------<  157<H>  4.0   |  26  |  1.54<H>    Ca    8.1<L>      24 Jan 2023 06:45  Phos  2.5     01-24  Mg     2.0     01-24    TPro  6.7  /  Alb  2.2<L>  /  TBili  1.1  /  DBili  0.7<H>  /  AST  117<H>  /  ALT  210<H>  /  AlkPhos  103  01-24    LIVER FUNCTIONS - ( 24 Jan 2023 10:45 )  Alb: 2.2 g/dL / Pro: 6.7 gm/dL / ALK PHOS: 103 U/L / ALT: 210 U/L / AST: 117 U/L / GGT: x           PT/INR - ( 24 Jan 2023 10:45 )   PT: 16.6 sec;   INR: 1.43 ratio     PTT - ( 24 Jan 2023 10:45 )  PTT:35.3 sec            EKG:    TELEMETRY:    CARDIAC TESTS:    RADIOLOGY & ADDITIONAL STUDIES:    ASSESSMENT & PLAN: HPI:   67 year old male with PMHx of HTN, Thyroid Goiter s/p thyroidectomy, HLD BIBEMS from home with AMS. Pt's ex wife concerned about pt reporting that he has been acting strange at home and that he has been saying strange things to their 11 year old son. Pt reports that he is depressed and that he owes money to the IRS. As per EMS pt is on xanax, Ambien and melatonin. Pt denies fever but endorses difficulty breathing, seeing things that other people do not. Pt has not taken xanax in 3 or 4 days. Pt drinks EtOH but denies withdrawal symptoms, non-smoker. Denies SI. Ex wife states speech was slurred. Patient was admitted with altered mental status. Patient was seen in the room and doing better. Patient knows that he is in hospital. Denies any complaints of chest pain, shortness of breath, headache, dizziness, nausea, vomiting or abdominal pain. No fall or head injury. Denies using any drugs.       1/23/24:  EP asked to evaluate this pt with above admission history.  Currently intubated and sedated on Levophed and Midodrine for hypotension.  LVEF 15-20%  TELE: SR 80-90 bpm, PVCs, brief runs SVT and NSVT  EKG: sinus tachycardia 106bpm, APC, VT 160ms, QRS 116ms, QTc 475ms          PAST MEDICAL & SURGICAL HISTORY:  Intranasal mass  Hypertension, unspecified type  Hyperlipidemia, unspecified hyperlipidemia type  Thyroid nodule  left thyroidectomy  Asthma  H/O partial thyroidectomy  2008  S/P T&A (status post tonsillectomy and adenoidectomy)  1965      SOCIAL HISTORY: +etoh abuse +substance use per chart    FAMILY HISTORY:        MEDICATIONS  (STANDING):  allopurinol 200 milliGRAM(s) Oral daily  chlordiazePOXIDE 25 milliGRAM(s) Oral four times a day  chlorhexidine 0.12% Liquid 15 milliLiter(s) Oral Mucosa every 12 hours  chlorhexidine 4% Liquid 1 Application(s) Topical <User Schedule>  dexMEDEtomidine Infusion 0.6 MICROgram(s)/kG/Hr (16.3 mL/Hr) IV Continuous <Continuous>  folic acid 1 milliGRAM(s) Oral daily  heparin   Injectable 5000 Unit(s) SubCutaneous every 8 hours  lactulose Syrup 20 Gram(s) Oral every 6 hours  levothyroxine 100 MICROGram(s) Oral daily  midodrine 10 milliGRAM(s) Oral every 8 hours  norepinephrine Infusion 0.1 MICROgram(s)/kG/Min (20.4 mL/Hr) IV Continuous <Continuous>  pantoprazole  Injectable 40 milliGRAM(s) IV Push daily  piperacillin/tazobactam IVPB.. 3.375 Gram(s) IV Intermittent every 8 hours  rifAXIMin 550 milliGRAM(s) Oral two times a day    MEDICATIONS  (PRN):  albuterol    90 MICROgram(s) HFA Inhaler 2 Puff(s) Inhalation every 6 hours PRN Shortness of Breath  ondansetron Injectable 4 milliGRAM(s) IV Push every 8 hours PRN Nausea and/or Vomiting  sodium chloride 0.9% lock flush 10 milliLiter(s) IV Push every 1 hour PRN Pre/post blood products, medications, blood draw, and to maintain line patency      Allergies    No Known Allergies    Intolerances          Vital Signs Last 24 Hrs  T(C): 37.3 (24 Jan 2023 00:00), Max: 37.3 (24 Jan 2023 00:00)  T(F): 99.1 (24 Jan 2023 00:00), Max: 99.1 (24 Jan 2023 00:00)  HR: 92 (24 Jan 2023 12:00) (72 - 97)  BP: --  BP(mean): --  RR: 27 (24 Jan 2023 12:00) (19 - 33)  SpO2: 100% (24 Jan 2023 12:00) (96% - 100%)    Parameters below as of 24 Jan 2023 08:00  Patient On (Oxygen Delivery Method): ventilator    O2 Concentration (%): 40      REVIEW OF SYSTEMS:  unable to obtain secondary to pt condition        PHYSICAL EXAMINATION:    GENERAL APPEARANCE: Intubated and sedated  HEENT:  Pupils are normal and react normally. No icterus. Mucous membranes well colored.  NECK:  Supple. No lymphadenopathy. Jugular venous pressure not elevated. Carotids equal.   HEART:   The cardiac impulse has a normal quality. There are no murmurs, rubs or gallops noted  CHEST:  Chest is clear to auscultation. Normal respiratory effort.  ABDOMEN:  Soft and nontender. +leal, +rectal tube  EXTREMITIES:  +2 edema bilat upper and lower extremities  SKIN:  No rash or significant lesions are noted.    I&O's Summary    23 Jan 2023 07:01 - 24 Jan 2023 07:00  --------------------------------------------------------  IN: 1102 mL / OUT: 1450 mL / NET: -348 mL        LABS:    01-24    146<H>  |  114<H>  |  45<H>  ----------------------------<  157<H>  4.0   |  26  |  1.54<H>    Ca    8.1<L>      24 Jan 2023 06:45  Phos  2.5     01-24  Mg     2.0     01-24    TPro  6.7  /  Alb  2.2<L>  /  TBili  1.1  /  DBili  0.7<H>  /  AST  117<H>  /  ALT  210<H>  /  AlkPhos  103  01-24    LIVER FUNCTIONS - ( 24 Jan 2023 10:45 )  Alb: 2.2 g/dL / Pro: 6.7 gm/dL / ALK PHOS: 103 U/L / ALT: 210 U/L / AST: 117 U/L / GGT: x           PT/INR - ( 24 Jan 2023 10:45 )   PT: 16.6 sec;   INR: 1.43 ratio     PTT - ( 24 Jan 2023 10:45 )  PTT:35.3 sec      CARDIAC TESTS:  < from: TTE Echo Complete w/o Contrast w/ Doppler (01.20.23 @ 11:00) >   Impression     Summary     Normal appearing mitral valve structure and function.   Mild mitral annular calcification is present.   Mild (1+) mitral regurgitation is present.   Normal aortic valve structure and function.   The ascending aorta and aortic root are dilated.   At least Mild (1+) aortic regurgitation is present.   Normal appearing tricuspid valve structure.   At least Mild (1+) tricuspid valve regurgitation is present.   Normal appearing pulmonic valve structure and function.   The left atrium is mildly dilated.   Severely reduced left ventricular systolic function.   The left ventricle cavity is dilated.   Estimated left ventricular ejection fraction is 15-20 %.   Dr. Will aware of study findings.   The right atrium appears mildly dilated.   Normal appearing right ventricle function.   The IVC is dilated with decreased respiratory variation.     Recommendation     Hospitalist team aware of study findings.        RADIOLOGY & ADDITIONAL STUDIES:    < from: Xray Chest 1 View-PORTABLE IMMEDIATE (Xray Chest 1 View-PORTABLE IMMEDIATE .) (01.20.23 @ 15:30) >    Impression:    There are small to moderate bilateral pleural effusions with lower lobe   consolidations.    No pulmonary venous congestion or pneumothorax.    Support lines and catheters as above.

## 2023-01-25 NOTE — PROGRESS NOTE ADULT - SUBJECTIVE AND OBJECTIVE BOX
PCP:    REQUESTING PHYSICIAN:    REASON FOR CONSULT:    CHIEF COMPLAINT:    HPI:   67 year old male with PMHx of HTN, Thyroid Goiter s/p thyroidectomy, HLD BIBEMS from home with AMS. Pt's ex wife concerned about pt reporting that he has been acting strange at home and that he has been saying strange things to their 11 year old son. Pt reports that he is depressed and that he owes money to the [employer]. As per EMS pt is on xanax, Ambien and melatonin. Pt denies fever but endorses difficulty breathing, seeing things that other people do not. Pt has not taken xanax in 3 or 4 days. Pt drinks EtOH but denies withdrawal symptoms, non-smoker. Denies SI. Ex wife states speech was slurred. Patient was admitted with altered mental status. Patient was seen in the room and doing better. Patient knows that he is in hospital. Denies any complaints of chest pain, shortness of breath, headache, dizziness, nausea, vomiting or abdominal pain. No fall or head injury. Denies using any drugs.     1/20/23 Patient  was receiving sedation , did vomit this morning , subsequent worsening of hypoxia , patient was intubated , patient noted to have severe ventricular systolic dysfunction , dilated aorta , as per his ex wife , patient was not seeing any physician for several  years , patient does drink regularly   patient cxr at the time of admission ,showed no evidence of pleural effusion , however  CXR showed bilateral pleural effusion ,   1/21/23 Patient remain intubated , sedated , mild hypotensive maintained with pressor low dose , improving oxygenation ,   improving renal , LFTS   1/22/23 Patient is remain same  , on lower dose of pressor , oxygen 40% intubated   1/23/'23: intubated sedated.  1/24/23 Patient is remain intubated , decreasing oxygen requirement ,  on pressor , monitor showed brief SVT episodes x 2 ,      and one episode of NSVT    1/25/23: Pt intubated and sedated. Occasional ectopy           (19 Jan 2023 08:51)      PAST MEDICAL & SURGICAL HISTORY:  Intranasal mass      Hypertension, unspecified type      Hyperlipidemia, unspecified hyperlipidemia type      Thyroid nodule  left thyroidectomy      Asthma      H/O partial thyroidectomy  2008      S/P T&amp;A (status post tonsillectomy and adenoidectomy)  1965          SOCIAL HISTORY:    FAMILY HISTORY:      ALLERGIES:  Allergies    No Known Allergies    Intolerances        MEDICATIONS:    MEDICATIONS  (STANDING):  allopurinol 200 milliGRAM(s) Oral daily  chlordiazePOXIDE 25 milliGRAM(s) Oral four times a day  chlorhexidine 0.12% Liquid 15 milliLiter(s) Oral Mucosa every 12 hours  chlorhexidine 4% Liquid 1 Application(s) Topical <User Schedule>  dexMEDEtomidine Infusion 0.6 MICROgram(s)/kG/Hr (16.3 mL/Hr) IV Continuous <Continuous>  folic acid 1 milliGRAM(s) Oral daily  heparin   Injectable 5000 Unit(s) SubCutaneous every 8 hours  lactulose Syrup 20 Gram(s) Oral every 6 hours  levothyroxine 100 MICROGram(s) Oral daily  midodrine 10 milliGRAM(s) Oral every 8 hours  norepinephrine Infusion 0.1 MICROgram(s)/kG/Min (20.4 mL/Hr) IV Continuous <Continuous>  pantoprazole  Injectable 40 milliGRAM(s) IV Push daily  piperacillin/tazobactam IVPB.. 3.375 Gram(s) IV Intermittent every 8 hours  potassium phosphate IVPB 15 milliMole(s) IV Intermittent once  rifAXIMin 550 milliGRAM(s) Oral two times a day    MEDICATIONS  (PRN):  acetaminophen    Suspension .. 650 milliGRAM(s) Oral every 6 hours PRN Temp greater or equal to 38C (100.4F)  albuterol    90 MICROgram(s) HFA Inhaler 2 Puff(s) Inhalation every 6 hours PRN Shortness of Breath  ondansetron Injectable 4 milliGRAM(s) IV Push every 8 hours PRN Nausea and/or Vomiting  sodium chloride 0.9% lock flush 10 milliLiter(s) IV Push every 1 hour PRN Pre/post blood products, medications, blood draw, and to maintain line patency        Vital Signs Last 24 Hrs  T(C): 38.9 (25 Jan 2023 08:00), Max: 39.1 (25 Jan 2023 00:38)  T(F): 102.1 (25 Jan 2023 08:00), Max: 102.3 (25 Jan 2023 00:38)  HR: 114 (25 Jan 2023 08:00) (78 - 114)  BP: 105/63 (25 Jan 2023 02:10) (105/63 - 105/63)  BP(mean): 73 (25 Jan 2023 02:10) (73 - 73)  RR: 39 (25 Jan 2023 08:00) (23 - 39)  SpO2: 99% (25 Jan 2023 08:00) (99% - 100%)    Parameters below as of 25 Jan 2023 08:00  Patient On (Oxygen Delivery Method): ventilator    O2 Concentration (%): 40Daily     Daily I&O's Summary    24 Jan 2023 07:01  -  25 Jan 2023 07:00  --------------------------------------------------------  IN: 2668 mL / OUT: 1300 mL / NET: 1368 mL    25 Jan 2023 07:01  -  25 Jan 2023 09:23  --------------------------------------------------------  IN: 385.4 mL / OUT: 0 mL / NET: 385.4 mL        PHYSICAL EXAM:    Constitutional: intubated  HEENT: PERR, EOMI,  No oral cyananosis.  Neck:  supple,  No JVD  Respiratory: Breath sounds are clear bilaterally, No wheezing, rales or rhonchi  Cardiovascular: S1 and S2, regular rate and rhythm, no Murmurs, gallops or rubs  Gastrointestinal: Distended  Extremities:Edema  Vascular: 2+ peripheral pulses  Neurological: Sedated  Musculoskeletal: no calf tenderness.  Skin: No rashes.      LABS: All Labs Reviewed:                        14.0   7.65  )-----------( 130      ( 25 Jan 2023 06:40 )             43.5     25 Jan 2023 06:40    146    |  118    |  44     ----------------------------<  176    3.5     |  25     |  1.64   24 Jan 2023 06:45    146    |  114    |  45     ----------------------------<  157    4.0     |  26     |  1.54   23 Jan 2023 07:30    144    |  113    |  48     ----------------------------<  146    3.6     |  26     |  1.48     Ca    8.2        25 Jan 2023 06:40  Ca    8.1        24 Jan 2023 06:45  Ca    8.1        23 Jan 2023 07:30  Phos  2.4       25 Jan 2023 06:40  Phos  2.5       24 Jan 2023 10:45  Mg     2.0       25 Jan 2023 06:40  Mg     2.0       24 Jan 2023 10:45  Mg     2.1       24 Jan 2023 06:45    TPro  6.8    /  Alb  2.1    /  TBili  0.9    /  DBili  x      /  AST  111    /  ALT  178    /  AlkPhos  101    25 Jan 2023 06:40  TPro  6.7    /  Alb  2.2    /  TBili  1.1    /  DBili  0.7    /  AST  117    /  ALT  210    /  AlkPhos  103    24 Jan 2023 10:45    PT/INR - ( 25 Jan 2023 06:40 )   PT: 16.7 sec;   INR: 1.43 ratio         PTT - ( 25 Jan 2023 06:40 )  PTT:37.3 sec      Blood Culture: Organism --  Gram Stain Blood -- Gram Stain   Moderate polymorphonuclear leukocytes per low power field  No Squamous epithelial cells per low power field  Few Gram Positive Cocci in Clusters per oil power field  Specimen Source ET Tube ET Tube  Culture-Blood --            RADIOLOGY/EKG:< from: 12 Lead ECG (01.18.23 @ 20:57) >  Diagnosis Line Sinus tachycardia with Premature supraventricular complexes  Pulmonary disease pattern  Septal infarct , age undetermined  Abnormal ECG  When compared with ECG of 12-FEB-2019 11:55,  Premature supraventricular complexes are now Present  Vent. rate has increased BY44 BPM  Nonspecific T wave abnormality, worse in Inferior leads  T wave inversion more evident in Lateral leads  Confirmed by MD ALVARADO PAUL ([id number]) on 1/19/2023 9:01:18 AM    < end of copied text >        ECHO/CARDIAC CATHTERIZATION/STRESS TEST:  < from: TTE Echo Complete w/o Contrast w/ Doppler (01.20.23 @ 11:00) >  Summary     Normal appearing mitral valve structure and function.   Mild mitral annular calcification is present.   Mild (1+) mitral regurgitation is present.   Normal aortic valve structure and function.   The ascending aorta and aortic root are dilated.   At least Mild (1+) aortic regurgitation is present.   Normal appearing tricuspid valve structure.   At least Mild (1+) tricuspid valve regurgitation is present.   Normal appearing pulmonic valve structure and function.   The left atrium is mildly dilated.   Severely reduced left ventricular systolic function.   The left ventricle cavity is dilated.   Estimated left ventricular ejection fraction is 15-20 %.   Dr. Will aware of study findings.   The right atrium appears mildly dilated.   Normal appearing right ventricle function.   The IVC is dilated with decreased respiratory variation.     Recommendation     Hospitalist team aware of study findings.     Signature     ----------------------------------------------------------------   Electronically signed by Kim Dumas MD(Interpreting   physician) on 01/20/2023 06:23 PM   ----------------------------------------------------------------      < end of copied text >

## 2023-01-25 NOTE — PROGRESS NOTE ADULT - SUBJECTIVE AND OBJECTIVE BOX
Patient is a 67y old  Male who presents with a chief complaint of Altered mental status. (24 Jan 2023 13:53)      BRIEF HOSPITAL COURSE:   66 yo m pmhx polysubstance abuse, ETOH abuse, goiter s/p thyroidectomy, HTN, HLD admitted 1/18 with ETOH withdrawal with course complicated by severe LV dysfunction and AMARJIT further complicated by hypoxic respiratory failure in setting of ams and aspiration requiring intubation and shock state requiring vasopressors.      Events last 24 hours:   Patient remains on levophed for bp support.  precedex as anxiolytic therapy.  febrile this evening, ordered for tylenol.      PAST MEDICAL & SURGICAL HISTORY:  Intranasal mass      Hypertension, unspecified type      Hyperlipidemia, unspecified hyperlipidemia type      Thyroid nodule  left thyroidectomy      Asthma      H/O partial thyroidectomy  2008      S/P T&amp;A (status post tonsillectomy and adenoidectomy)  1965        Allergies    No Known Allergies    Intolerances      FAMILY HISTORY:      Social History:     Review of Systems:  CONSTITUTIONAL: No fever, chills, or fatigue  EYES: No eye pain, visual disturbances, or discharge  ENMT:  No difficulty hearing, tinnitus, vertigo; No sinus or throat pain  NECK: No pain or stiffness  RESPIRATORY: No cough, wheezing, chills or hemoptysis; No shortness of breath  CARDIOVASCULAR: No chest pain, palpitations, dizziness, or leg swelling  GASTROINTESTINAL: No abdominal or epigastric pain. No nausea, vomiting, or hematemesis; No diarrhea or constipation. No melena or hematochezia.  GENITOURINARY: No dysuria, frequency, hematuria, or incontinence  NEUROLOGICAL: No headaches, memory loss, loss of strength, numbness, or tremors  SKIN: No itching, burning, rashes, or lesions   MUSCULOSKELETAL: No joint pain or swelling; No muscle, back, or extremity pain  PSYCHIATRIC: No depression, anxiety, mood swings, or difficulty sleeping      Vitals During Exam:   HR:   BP:   RR:  sPO2:     Physical Examination:    General: No acute distress.      HEENT: Pupils equal, reactive to light.  Symmetric.    PULM: Clear to auscultation bilaterally, no significant sputum production    CVS: Regular rate and rhythm, no murmurs, rubs, or gallops    ABD: Soft, nondistended, nontender, normoactive bowel sounds, no masses    EXT: No edema, nontender    SKIN: Warm and well perfused, no rashes noted.    NEURO: Alert, oriented, interactive, nonfocal      Medications:  piperacillin/tazobactam IVPB.. 3.375 Gram(s) IV Intermittent every 8 hours  rifAXIMin 550 milliGRAM(s) Oral two times a day    midodrine 10 milliGRAM(s) Oral every 8 hours  norepinephrine Infusion 0.1 MICROgram(s)/kG/Min IV Continuous <Continuous>    albuterol    90 MICROgram(s) HFA Inhaler 2 Puff(s) Inhalation every 6 hours PRN    acetaminophen    Suspension .. 650 milliGRAM(s) Oral every 6 hours PRN  chlordiazePOXIDE 25 milliGRAM(s) Oral four times a day  dexMEDEtomidine Infusion 0.6 MICROgram(s)/kG/Hr IV Continuous <Continuous>  ondansetron Injectable 4 milliGRAM(s) IV Push every 8 hours PRN      heparin   Injectable 5000 Unit(s) SubCutaneous every 8 hours    lactulose Syrup 20 Gram(s) Oral every 6 hours  pantoprazole  Injectable 40 milliGRAM(s) IV Push daily      allopurinol 200 milliGRAM(s) Oral daily  levothyroxine 100 MICROGram(s) Oral daily    folic acid 1 milliGRAM(s) Oral daily  sodium chloride 0.9% lock flush 10 milliLiter(s) IV Push every 1 hour PRN      chlorhexidine 0.12% Liquid 15 milliLiter(s) Oral Mucosa every 12 hours  chlorhexidine 4% Liquid 1 Application(s) Topical <User Schedule>        Mode: AC/ CMV (Assist Control/ Continuous Mandatory Ventilation)  RR (machine): 22  TV (machine): 500  FiO2: 40  PEEP: 5  ITime: 1  PIP: 16      ICU Vital Signs Last 24 Hrs  T(C): 39.1 (25 Jan 2023 00:38), Max: 39.1 (25 Jan 2023 00:38)  T(F): 102.3 (25 Jan 2023 00:38), Max: 102.3 (25 Jan 2023 00:38)  HR: 80 (25 Jan 2023 02:10) (78 - 92)  BP: 105/63 (25 Jan 2023 02:10) (105/63 - 105/63)  BP(mean): 73 (25 Jan 2023 02:10) (73 - 73)  ABP: 85/73 (25 Jan 2023 02:10) (85/73 - 104/67)  ABP(mean): 79 (25 Jan 2023 02:10) (73 - 149)  RR: 32 (25 Jan 2023 02:10) (22 - 33)  SpO2: 100% (25 Jan 2023 02:10) (99% - 100%)    O2 Parameters below as of 24 Jan 2023 17:00  Patient On (Oxygen Delivery Method): ventilator    O2 Concentration (%): 40      Vital Signs Last 24 Hrs  T(C): 39.1 (25 Jan 2023 00:38), Max: 39.1 (25 Jan 2023 00:38)  T(F): 102.3 (25 Jan 2023 00:38), Max: 102.3 (25 Jan 2023 00:38)  HR: 80 (25 Jan 2023 02:10) (78 - 92)  BP: 105/63 (25 Jan 2023 02:10) (105/63 - 105/63)  BP(mean): 73 (25 Jan 2023 02:10) (73 - 73)  RR: 32 (25 Jan 2023 02:10) (22 - 33)  SpO2: 100% (25 Jan 2023 02:10) (99% - 100%)    Parameters below as of 24 Jan 2023 17:00  Patient On (Oxygen Delivery Method): ventilator    O2 Concentration (%): 40        I&O's Detail    23 Jan 2023 07:01  -  24 Jan 2023 07:00  --------------------------------------------------------  IN:    Dexmedetomidine: 314 mL    IV PiggyBack: 250 mL    Nepro with Carb Steady: 440 mL    Norepinephrine: 98 mL  Total IN: 1102 mL    OUT:    Indwelling Catheter - Urethral (mL): 1450 mL  Total OUT: 1450 mL    Total NET: -348 mL      24 Jan 2023 07:01  -  25 Jan 2023 02:19  --------------------------------------------------------  IN:    Dexmedetomidine: 140 mL    Enteral Tube Flush: 280 mL    IV PiggyBack: 100 mL    Nepro with Carb Steady: 480 mL    Norepinephrine: 98 mL  Total IN: 1098 mL    OUT:    Indwelling Catheter - Urethral (mL): 800 mL  Total OUT: 800 mL    Total NET: 298 mL            LABS:    01-24    146<H>  |  114<H>  |  45<H>  ----------------------------<  157<H>  4.0   |  26  |  1.54<H>    Ca    8.1<L>      24 Jan 2023 06:45  Phos  2.5     01-24  Mg     2.0     01-24    TPro  6.7  /  Alb  2.2<L>  /  TBili  1.1  /  DBili  0.7<H>  /  AST  117<H>  /  ALT  210<H>  /  AlkPhos  103  01-24          CAPILLARY BLOOD GLUCOSE        PT/INR - ( 24 Jan 2023 10:45 )   PT: 16.6 sec;   INR: 1.43 ratio         PTT - ( 24 Jan 2023 10:45 )  PTT:35.3 sec    CULTURES:  Culture Results:   Normal Respiratory Citlalli present (01-21 @ 10:49)        RADIOLOGY: ***      SUPPLEMENTAL O2:   LINES:  IVF:  PLUMMER:   PPx:   CONTACT: Patient is a 67y old  Male who presents with a chief complaint of Altered mental status. (24 Jan 2023 13:53)      BRIEF HOSPITAL COURSE:   66 yo m pmhx polysubstance abuse, ETOH abuse, goiter s/p thyroidectomy, HTN, HLD admitted 1/18 with ETOH withdrawal with course complicated by severe LV dysfunction and AMARIJT further complicated by hypoxic respiratory failure in setting of ams and aspiration requiring intubation and shock state requiring vasopressors.      Events last 24 hours:   Patient remains on levophed for bp support.  precedex as anxiolytic therapy.  febrile this evening, ordered for tylenol.      PAST MEDICAL & SURGICAL HISTORY:  Intranasal mass      Hypertension, unspecified type      Hyperlipidemia, unspecified hyperlipidemia type      Thyroid nodule  left thyroidectomy      Asthma      H/O partial thyroidectomy  2008      S/P T&amp;A (status post tonsillectomy and adenoidectomy)  1965        Allergies    No Known Allergies    Intolerances      FAMILY HISTORY:      Social History:     Review of Systems:  unable to obtain 2/2 intubted/sedated      Physical Examination:    General: adult male, lying in bed, nad    HEENT: ett and ogt in place    PULM: coarse b/l    CVS: nsr    ABD: Soft, nondistended, nontender, +bs    EXT: + edema    SKIN: Warm     NEURO: sedated      Medications:  piperacillin/tazobactam IVPB.. 3.375 Gram(s) IV Intermittent every 8 hours  rifAXIMin 550 milliGRAM(s) Oral two times a day    midodrine 10 milliGRAM(s) Oral every 8 hours  norepinephrine Infusion 0.1 MICROgram(s)/kG/Min IV Continuous <Continuous>    albuterol    90 MICROgram(s) HFA Inhaler 2 Puff(s) Inhalation every 6 hours PRN    acetaminophen    Suspension .. 650 milliGRAM(s) Oral every 6 hours PRN  chlordiazePOXIDE 25 milliGRAM(s) Oral four times a day  dexMEDEtomidine Infusion 0.6 MICROgram(s)/kG/Hr IV Continuous <Continuous>  ondansetron Injectable 4 milliGRAM(s) IV Push every 8 hours PRN      heparin   Injectable 5000 Unit(s) SubCutaneous every 8 hours    lactulose Syrup 20 Gram(s) Oral every 6 hours  pantoprazole  Injectable 40 milliGRAM(s) IV Push daily      allopurinol 200 milliGRAM(s) Oral daily  levothyroxine 100 MICROGram(s) Oral daily    folic acid 1 milliGRAM(s) Oral daily  sodium chloride 0.9% lock flush 10 milliLiter(s) IV Push every 1 hour PRN      chlorhexidine 0.12% Liquid 15 milliLiter(s) Oral Mucosa every 12 hours  chlorhexidine 4% Liquid 1 Application(s) Topical <User Schedule>        Mode: AC/ CMV (Assist Control/ Continuous Mandatory Ventilation)  RR (machine): 22  TV (machine): 500  FiO2: 40  PEEP: 5  ITime: 1  PIP: 16      ICU Vital Signs Last 24 Hrs  T(C): 39.1 (25 Jan 2023 00:38), Max: 39.1 (25 Jan 2023 00:38)  T(F): 102.3 (25 Jan 2023 00:38), Max: 102.3 (25 Jan 2023 00:38)  HR: 80 (25 Jan 2023 02:10) (78 - 92)  BP: 105/63 (25 Jan 2023 02:10) (105/63 - 105/63)  BP(mean): 73 (25 Jan 2023 02:10) (73 - 73)  ABP: 85/73 (25 Jan 2023 02:10) (85/73 - 104/67)  ABP(mean): 79 (25 Jan 2023 02:10) (73 - 149)  RR: 32 (25 Jan 2023 02:10) (22 - 33)  SpO2: 100% (25 Jan 2023 02:10) (99% - 100%)    O2 Parameters below as of 24 Jan 2023 17:00  Patient On (Oxygen Delivery Method): ventilator    O2 Concentration (%): 40      Vital Signs Last 24 Hrs  T(C): 39.1 (25 Jan 2023 00:38), Max: 39.1 (25 Jan 2023 00:38)  T(F): 102.3 (25 Jan 2023 00:38), Max: 102.3 (25 Jan 2023 00:38)  HR: 80 (25 Jan 2023 02:10) (78 - 92)  BP: 105/63 (25 Jan 2023 02:10) (105/63 - 105/63)  BP(mean): 73 (25 Jan 2023 02:10) (73 - 73)  RR: 32 (25 Jan 2023 02:10) (22 - 33)  SpO2: 100% (25 Jan 2023 02:10) (99% - 100%)    Parameters below as of 24 Jan 2023 17:00  Patient On (Oxygen Delivery Method): ventilator    O2 Concentration (%): 40        I&O's Detail    23 Jan 2023 07:01  -  24 Jan 2023 07:00  --------------------------------------------------------  IN:    Dexmedetomidine: 314 mL    IV PiggyBack: 250 mL    Nepro with Carb Steady: 440 mL    Norepinephrine: 98 mL  Total IN: 1102 mL    OUT:    Indwelling Catheter - Urethral (mL): 1450 mL  Total OUT: 1450 mL    Total NET: -348 mL      24 Jan 2023 07:01  -  25 Jan 2023 02:19  --------------------------------------------------------  IN:    Dexmedetomidine: 140 mL    Enteral Tube Flush: 280 mL    IV PiggyBack: 100 mL    Nepro with Carb Steady: 480 mL    Norepinephrine: 98 mL  Total IN: 1098 mL    OUT:    Indwelling Catheter - Urethral (mL): 800 mL  Total OUT: 800 mL    Total NET: 298 mL            LABS:    01-24    146<H>  |  114<H>  |  45<H>  ----------------------------<  157<H>  4.0   |  26  |  1.54<H>    Ca    8.1<L>      24 Jan 2023 06:45  Phos  2.5     01-24  Mg     2.0     01-24    TPro  6.7  /  Alb  2.2<L>  /  TBili  1.1  /  DBili  0.7<H>  /  AST  117<H>  /  ALT  210<H>  /  AlkPhos  103  01-24          CAPILLARY BLOOD GLUCOSE        PT/INR - ( 24 Jan 2023 10:45 )   PT: 16.6 sec;   INR: 1.43 ratio         PTT - ( 24 Jan 2023 10:45 )  PTT:35.3 sec    CULTURES:  Culture Results:   Normal Respiratory Citlalli present (01-21 @ 10:49)        RADIOLOGY: ***      SUPPLEMENTAL O2:   LINES:  IVF:  PLUMMER:   ANTONx:   CONTACT:

## 2023-01-25 NOTE — PROGRESS NOTE ADULT - PROBLEM SELECTOR PLAN 6
with multi substance abuse with complications multiorgan dysfunction ,
with multi substance abuse with complications multiorgan dysfunction ,

## 2023-01-25 NOTE — PROGRESS NOTE ADULT - ASSESSMENT
66 yo m pmhx polysubstance abuse, ETOH abuse, goiter s/p thyroidectomy, HTN, HLD admitted 1/18 with ETOH withdrawal with course complicated by severe LV dysfunction, AMARJIT and elevated LFTs further complicated by hypoxic respiratory failure in setting of ams and aspiration requiring intubation and shock state requiring vasopressors.      NEURO:  Precedex as anxiolytic therapy.  CIWA on librium taper.   CV: Distributive shock requiring vasopressor therapy, actively titrating levophed for MAP >65, weaning as tolerated.  Midodrine as adjunctive therapy.  Hold parameters added.    RESP: Hypoxic respiratory failure, ac/vc 4-6 cc/kg tv lung protective strategies, actively titrating settings for spo2 >92%  RENAL: AMARJIT, renally dose meds, trend urine output, bun/cr and electrolytes, replace lytes as needed.  leal in place.   GI: NPO. Transaminitis improving (?cocaine induced acute liver injury).  HE on lactulose and xifaxin. PPI therapy.    ENDO: No active issues  ID: [email or web address] for coverage.    HEME: Heparin for vte ppx   DISPO: full code.      Critical Care time: 40 mins assessing presenting problems of acute illness that poses high probability of life threatening deterioration or end organ damage/dysfunction.  Medical decision making including Initiating plan of care, reviewing data, reviewing radiology, discussing with multidisciplinary team, non inclusive of procedures, discussing goals of care with patient/family  66 yo m pmhx polysubstance abuse, ETOH abuse, goiter s/p thyroidectomy, HTN, HLD admitted 1/18 with ETOH withdrawal with course complicated by severe LV dysfunction, AMARJIT and elevated LFTs further complicated by hypoxic respiratory failure in setting of ams and aspiration requiring intubation and shock state requiring vasopressors.      NEURO:  Precedex as anxiolytic therapy.  CIWA on librium taper.   CV: Distributive shock requiring vasopressor therapy, actively titrating levophed for MAP >65, weaning as tolerated.  Midodrine as adjunctive therapy.  Hold parameters added.    RESP: Hypoxic respiratory failure, ac/vc 4-6 cc/kg tv lung protective strategies, actively titrating settings for spo2 >92%  RENAL: AMARJIT, renally dose meds, trend urine output, bun/cr and electrolytes, replace lytes as needed.  leal in place.   GI: NPO. Transaminitis improving (?cocaine induced acute liver injury).  HE on lactulose and xifaxin. PPI therapy.    ENDO: synthroid  ID: Zosyn for coverage.    HEME: Heparin for vte ppx   DISPO: full code.      Critical Care time: 40 mins assessing presenting problems of acute illness that poses high probability of life threatening deterioration or end organ damage/dysfunction.  Medical decision making including Initiating plan of care, reviewing data, reviewing radiology, discussing with multidisciplinary team, non inclusive of procedures, discussing goals of care with patient/family

## 2023-01-25 NOTE — PROGRESS NOTE ADULT - SUBJECTIVE AND OBJECTIVE BOX
68 y/o male with pmhx of polysubstance abuse, etoh abuse, goiter s/p thyroidectomy, HTN, HLD admitted on 1/18 with hallucinations due to etoh withdrawal found to have severe LV dysfunction and AMARJIT of unclear duration transferred to CCU on 1/20 with mixed respiratory failure due to aspiration pneumonitis and worsening mental status requiring intubation. post intubation c/b shock requiring pressors.   Case discussed on CCU round and with the family.  1/24  Cased discussed on CCU rounds, SBT ongoing  1/25  Case discussed on CCU rounds.  Clinically improved. On SBT.      ICU Vital Signs Last 24 Hrs  T(C): 37.9 (25 Jan 2023 16:00), Max: 39.1 (25 Jan 2023 00:38)  T(F): 100.3 (25 Jan 2023 16:00), Max: 102.3 (25 Jan 2023 00:38)  HR: 84 (25 Jan 2023 17:15) (78 - 114)  BP: 94/67 (25 Jan 2023 10:15) (94/67 - 105/63)  BP(mean): 73 (25 Jan 2023 10:15) (73 - 73)  ABP: 89/60 (25 Jan 2023 17:15) (80/56 - 117/81)  ABP(mean): 72 (25 Jan 2023 17:15) (61 - 149)  RR: 30 (25 Jan 2023 17:15) (20 - 39)  SpO2: 100% (25 Jan 2023 17:15) (97% - 100%)    O2 Parameters below as of 25 Jan 2023 16:00  Patient On (Oxygen Delivery Method): BiPAP/CPAP    O2 Concentration (%): 40                                14.0   7.65  )-----------( 130      ( 25 Jan 2023 06:40 )             43.5         01-25    146<H>  |  118<H>  |  44<H>  ----------------------------<  176<H>  3.5   |  25  |  1.64<H>    Ca    8.2<L>      25 Jan 2023 06:40  Phos  2.4     01-25  Mg     2.0     01-25    TPro  6.8  /  Alb  2.1<L>  /  TBili  0.9  /  DBili  x   /  AST  111<H>  /  ALT  178<H>  /  AlkPhos  101  01-25    LIVER FUNCTIONS - ( 25 Jan 2023 06:40 )  Alb: 2.1 g/dL / Pro: 6.8 gm/dL / ALK PHOS: 101 U/L / ALT: 178 U/L / AST: 111 U/L / GGT: x             PT/INR - ( 25 Jan 2023 06:40 )   PT: 16.7 sec;   INR: 1.43 ratio         PTT - ( 25 Jan 2023 06:40 )  PTT:37.3 sec

## 2023-01-25 NOTE — PROGRESS NOTE ADULT - SUBJECTIVE AND OBJECTIVE BOX
NEPHROLOGY INTERVAL HPI/OVERNIGHT EVENTS:    Date of Service: 01-25-23 @ 09:06    1/25--  1/23 intubated, sedation to be weaned off, oxygenating well + uop   1/22 intubated and sedated, remains on pressors   1/21 intubated yesterday on precedex and pressors  1/20 pt with episode of emesis with hypoxia, concern for aspiration with adjustment of his ativan dose.  wife at bedside.  unofficial echo with ef of 15%  HPI: Hx obtained from ex wife.  66 yo man with PMHX of HTN,( on Losartan), hx of asthma, living alone, brought to Ed when found to be confused with hallucinations.  Per pt's wife, pt was well until a few days ago.  Yesterday noted with slurred speech and confusion and relates ETOH hx of drinking vodka/screwdriver 4-5 x/day.  On Losartan for HTN but has not seen PMD since pandemic.  No known hx of CKD.  Creat 2.76  and abnormal LFTs.  Urine positive for Cocaine and Benzo, though pt adamantly  denies  illicit drug use  CT non contrast with no intra abdominal pathology, positive for small bilateral pleural effusion with cardiomegaly.    PMHX and PSHX.  --HTN  --Thyroid goiter --post Thyroidectomy  --Post intranasal polyp resection.  --Hx of Asthma    MEDICATIONS  (STANDING):  allopurinol 200 milliGRAM(s) Oral daily  chlordiazePOXIDE 25 milliGRAM(s) Oral four times a day  chlorhexidine 0.12% Liquid 15 milliLiter(s) Oral Mucosa every 12 hours  chlorhexidine 4% Liquid 1 Application(s) Topical <User Schedule>  dexMEDEtomidine Infusion 0.6 MICROgram(s)/kG/Hr (16.3 mL/Hr) IV Continuous <Continuous>  folic acid 1 milliGRAM(s) Oral daily  heparin   Injectable 5000 Unit(s) SubCutaneous every 8 hours  lactulose Syrup 20 Gram(s) Oral every 6 hours  levothyroxine 100 MICROGram(s) Oral daily  midodrine 10 milliGRAM(s) Oral every 8 hours  norepinephrine Infusion 0.1 MICROgram(s)/kG/Min (20.4 mL/Hr) IV Continuous <Continuous>  pantoprazole  Injectable 40 milliGRAM(s) IV Push daily  piperacillin/tazobactam IVPB.. 3.375 Gram(s) IV Intermittent every 8 hours  potassium phosphate IVPB 15 milliMole(s) IV Intermittent once  rifAXIMin 550 milliGRAM(s) Oral two times a day    MEDICATIONS  (PRN):  acetaminophen    Suspension .. 650 milliGRAM(s) Oral every 6 hours PRN Temp greater or equal to 38C (100.4F)  albuterol    90 MICROgram(s) HFA Inhaler 2 Puff(s) Inhalation every 6 hours PRN Shortness of Breath  ondansetron Injectable 4 milliGRAM(s) IV Push every 8 hours PRN Nausea and/or Vomiting  sodium chloride 0.9% lock flush 10 milliLiter(s) IV Push every 1 hour PRN Pre/post blood products, medications, blood draw, and to maintain line patency    Vital Signs Last 24 Hrs  T(C): 38.9 (25 Jan 2023 08:00), Max: 39.1 (25 Jan 2023 00:38)  T(F): 102.1 (25 Jan 2023 08:00), Max: 102.3 (25 Jan 2023 00:38)  HR: 114 (25 Jan 2023 08:00) (78 - 114)  BP: 105/63 (25 Jan 2023 02:10) (105/63 - 105/63)  BP(mean): 73 (25 Jan 2023 02:10) (73 - 73)  RR: 39 (25 Jan 2023 08:00) (23 - 39)  SpO2: 99% (25 Jan 2023 08:00) (99% - 100%)    Parameters below as of 25 Jan 2023 08:00  Patient On (Oxygen Delivery Method): ventilator    O2 Concentration (%): 40  Daily     Daily     01-24 @ 07:01 - 01-25 @ 07:00  --------------------------------------------------------  IN: 2668 mL / OUT: 1300 mL / NET: 1368 mL    01-25 @ 07:01  -  01-25 @ 09:06  --------------------------------------------------------  IN: 385.4 mL / OUT: 0 mL / NET: 385.4 mL        PHYSICAL EXAM:  GENERAL:   CHEST/LUNG:   HEART:   ABDOMEN:   EXTREMITIES:   SKIN:     LABS:                        14.0   7.65  )-----------( 130      ( 25 Jan 2023 06:40 )             43.5     01-25    146<H>  |  118<H>  |  44<H>  ----------------------------<  176<H>  3.5   |  25  |  1.64<H>    Ca    8.2<L>      25 Jan 2023 06:40  Phos  2.4     01-25  Mg     2.0     01-25    TPro  6.8  /  Alb  2.1<L>  /  TBili  0.9  /  DBili  x   /  AST  111<H>  /  ALT  178<H>  /  AlkPhos  101  01-25    PT/INR - ( 25 Jan 2023 06:40 )   PT: 16.7 sec;   INR: 1.43 ratio         PTT - ( 25 Jan 2023 06:40 )  PTT:37.3 sec    Magnesium, Serum: 2.0 mg/dL (01-25 @ 06:40)  Phosphorus Level, Serum: 2.4 mg/dL (01-25 @ 06:40)  Magnesium, Serum: 2.0 mg/dL (01-24 @ 10:45)  Phosphorus Level, Serum: 2.5 mg/dL (01-24 @ 10:45)          RADIOLOGY & ADDITIONAL TESTS:   NEPHROLOGY INTERVAL HPI/OVERNIGHT EVENTS:    Date of Service: 01-25-23 @ 09:06    1/25--Remains on vent. Remains on pressors. UO 1300cc.  1/23 intubated, sedation to be weaned off, oxygenating well + uop   1/22 intubated and sedated, remains on pressors   1/21 intubated yesterday on precedex and pressors  1/20 pt with episode of emesis with hypoxia, concern for aspiration with adjustment of his ativan dose.  wife at bedside.  unofficial echo with ef of 15%  HPI: Hx obtained from ex wife.  66 yo man with PMHX of HTN,( on Losartan), hx of asthma, living alone, brought to Ed when found to be confused with hallucinations.  Per pt's wife, pt was well until a few days ago.  Yesterday noted with slurred speech and confusion and relates ETOH hx of drinking vodka/screwdriver 4-5 x/day.  On Losartan for HTN but has not seen PMD since pandemic.  No known hx of CKD.  Creat 2.76  and abnormal LFTs.  Urine positive for Cocaine and Benzo, though pt adamantly  denies  illicit drug use  CT non contrast with no intra abdominal pathology, positive for small bilateral pleural effusion with cardiomegaly.    PMHX and PSHX.  --HTN  --Thyroid goiter --post Thyroidectomy  --Post intranasal polyp resection.  --Hx of Asthma    MEDICATIONS  (STANDING):  allopurinol 200 milliGRAM(s) Oral daily  chlordiazePOXIDE 25 milliGRAM(s) Oral four times a day  chlorhexidine 0.12% Liquid 15 milliLiter(s) Oral Mucosa every 12 hours  chlorhexidine 4% Liquid 1 Application(s) Topical <User Schedule>  dexMEDEtomidine Infusion 0.6 MICROgram(s)/kG/Hr (16.3 mL/Hr) IV Continuous <Continuous>  folic acid 1 milliGRAM(s) Oral daily  heparin   Injectable 5000 Unit(s) SubCutaneous every 8 hours  lactulose Syrup 20 Gram(s) Oral every 6 hours  levothyroxine 100 MICROGram(s) Oral daily  midodrine 10 milliGRAM(s) Oral every 8 hours  norepinephrine Infusion 0.1 MICROgram(s)/kG/Min (20.4 mL/Hr) IV Continuous <Continuous>  pantoprazole  Injectable 40 milliGRAM(s) IV Push daily  piperacillin/tazobactam IVPB.. 3.375 Gram(s) IV Intermittent every 8 hours  potassium phosphate IVPB 15 milliMole(s) IV Intermittent once  rifAXIMin 550 milliGRAM(s) Oral two times a day    MEDICATIONS  (PRN):  acetaminophen    Suspension .. 650 milliGRAM(s) Oral every 6 hours PRN Temp greater or equal to 38C (100.4F)  albuterol    90 MICROgram(s) HFA Inhaler 2 Puff(s) Inhalation every 6 hours PRN Shortness of Breath  ondansetron Injectable 4 milliGRAM(s) IV Push every 8 hours PRN Nausea and/or Vomiting  sodium chloride 0.9% lock flush 10 milliLiter(s) IV Push every 1 hour PRN Pre/post blood products, medications, blood draw, and to maintain line patency    Vital Signs Last 24 Hrs  T(C): 38.9 (25 Jan 2023 08:00), Max: 39.1 (25 Jan 2023 00:38)  T(F): 102.1 (25 Jan 2023 08:00), Max: 102.3 (25 Jan 2023 00:38)  HR: 114 (25 Jan 2023 08:00) (78 - 114)  BP: 105/63 (25 Jan 2023 02:10) (105/63 - 105/63)  BP(mean): 73 (25 Jan 2023 02:10) (73 - 73)  RR: 39 (25 Jan 2023 08:00) (23 - 39)  SpO2: 99% (25 Jan 2023 08:00) (99% - 100%)    Parameters below as of 25 Jan 2023 08:00  Patient On (Oxygen Delivery Method): ventilator    O2 Concentration (%): 40  Daily     Daily     01-24 @ 07:01 - 01-25 @ 07:00  --------------------------------------------------------  IN: 2668 mL / OUT: 1300 mL / NET: 1368 mL    01-25 @ 07:01 - 01-25 @ 09:06  --------------------------------------------------------  IN: 385.4 mL / OUT: 0 mL / NET: 385.4 mL    PHYSICAL EXAM:  GENERAL: on vent  CHEST/LUNG: scattered rhonchi  HEART: S1S2 tachy  ABDOMEN: distended  EXTREMITIES: 2+ edema  SKIN:     LABS:                        14.0   7.65  )-----------( 130      ( 25 Jan 2023 06:40 )             43.5     01-25    146<H>  |  118<H>  |  44<H>  ----------------------------<  176<H>  3.5   |  25  |  1.64<H>    Ca    8.2<L>      25 Jan 2023 06:40  Phos  2.4     01-25  Mg     2.0     01-25    TPro  6.8  /  Alb  2.1<L>  /  TBili  0.9  /  DBili  x   /  AST  111<H>  /  ALT  178<H>  /  AlkPhos  101  01-25    PT/INR - ( 25 Jan 2023 06:40 )   PT: 16.7 sec;   INR: 1.43 ratio         PTT - ( 25 Jan 2023 06:40 )  PTT:37.3 sec    Magnesium, Serum: 2.0 mg/dL (01-25 @ 06:40)  Phosphorus Level, Serum: 2.4 mg/dL (01-25 @ 06:40)  Magnesium, Serum: 2.0 mg/dL (01-24 @ 10:45)  Phosphorus Level, Serum: 2.5 mg/dL (01-24 @ 10:45)          RADIOLOGY & ADDITIONAL TESTS:

## 2023-01-25 NOTE — PROGRESS NOTE ADULT - ASSESSMENT
68 yo man with HTN and known ETOH abuse admitted with AMS.  Evidence of cocaine use though pt denies.  --AMARJIT with unclear baseline function: in the setting of recent Cocaine use and ARB.    Continue to hold ARB for now.    D/c further IVF for now.  --Continue to monitor renal function and LFTS.  Continue to trend CPK.  --check repeat ammonia level.  --AMS : due to illicit drugs and ETOH.  --LE edema : check urine protein and Echo, franklin with ETOH hx.    1/20 MK   - AMARJIT improving     monitor off ivf     off arb    unclear if with component of ckd   - CHF with ef of 15%  - hypoxia with recent aspiration   - ? asymptomatic hyperuricemia : fu trend     and start allopurinol   dw dr bauer    1/21 MK   - AMARJIT improving     monitor off ivf     off arb    unclear if with component of ckd   - CHF with ef of 15%  - hypoxia with recent aspiration, no intubated     on zosyn renally dosed  - ? cirrhosis on rifampin   - ? asymptomatic hyperuricemia : fu trend      allopurinol started     1/22 MK   - AMARJIT improving     monitor off ivf     off arb    unclear if with component of ckd   - CHF with ef of 15%  - hypoxia with recent aspiration, no intubated     on zosyn renally dosed  - ? ETOH cirrhosis on rifampin and lactuose  - ? asymptomatic hyperuricemia : fu trend      allopurinol started, fu levels today   - FLC elevated, check MOSES send     1/23 MK   - AMARJIT improving     monitor off ivf     off arb    unclear if with component of ckd   - CHF with ef of 15%  - hypoxia with recent aspiration, now intubated     on zosyn renally dosed    planned for weaning of sedation   - ? ETOH cirrhosis on rifampin and lactuose  - ? asymptomatic hyperuricemia : fu trend      allopurinol started, fu levels today      will inc dose of allopurinol further   - FLC elevated, check MOSES send     does not require 24 hour collection   dw rn     1/24 MK   - AMARJIT improving and leveling off ? ckd at baseline   - CHF with ef of 15%  - hypoxia with recent aspiration, now intubated     on zosyn renally dosed    remains on sedation   - ? ETOH cirrhosis on rifampin and lactuose  - ? asymptomatic hyperuricemia : fu trend      allopurinol started,uric acid dec   - FLC elevated, check MOSES send     does not require 24 hour collection     will repeat FLC ordered when in AMARJIT        68 yo man with HTN and known ETOH abuse admitted with AMS.  Evidence of cocaine use though pt denies.  --AMARJIT with unclear baseline function: in the setting of recent Cocaine use and ARB.    Continue to hold ARB for now.    D/c further IVF for now.  --Continue to monitor renal function and LFTS.  Continue to trend CPK.  --check repeat ammonia level.  --AMS : due to illicit drugs and ETOH.  --LE edema : check urine protein and Echo, franklin with ETOH hx.    1/20 MK   - AMARJIT improving     monitor off ivf     off arb    unclear if with component of ckd   - CHF with ef of 15%  - hypoxia with recent aspiration   - ? asymptomatic hyperuricemia : fu trend     and start allopurinol   dw dr bauer    1/21 MK   - AMARJIT improving     monitor off ivf     off arb    unclear if with component of ckd   - CHF with ef of 15%  - hypoxia with recent aspiration, no intubated     on zosyn renally dosed  - ? cirrhosis on rifampin   - ? asymptomatic hyperuricemia : fu trend      allopurinol started     1/22 MK   - AMARJIT improving     monitor off ivf     off arb    unclear if with component of ckd   - CHF with ef of 15%  - hypoxia with recent aspiration, no intubated     on zosyn renally dosed  - ? ETOH cirrhosis on rifampin and lactuose  - ? asymptomatic hyperuricemia : fu trend      allopurinol started, fu levels today   - FLC elevated, check MOSES send     1/23 MK   - AMARJIT improving     monitor off ivf     off arb    unclear if with component of ckd   - CHF with ef of 15%  - hypoxia with recent aspiration, now intubated     on zosyn renally dosed    planned for weaning of sedation   - ? ETOH cirrhosis on rifampin and lactuose  - ? asymptomatic hyperuricemia : fu trend      allopurinol started, fu levels today      will inc dose of allopurinol further   - FLC elevated, check MOSES send     does not require 24 hour collection   dw rn     1/24 MK   - AMARJIT improving and leveling off ? ckd at baseline   - CHF with ef of 15%  - hypoxia with recent aspiration, now intubated     on zosyn renally dosed    remains on sedation   - ? ETOH cirrhosis on rifampin and lactuose  - ? asymptomatic hyperuricemia : fu trend      allopurinol started,uric acid dec   - FLC elevated, check MOSES send     does not require 24 hour collection     will repeat FLC ordered when in AMARJIT     1/25 SY  --AMARJIT : improved.  Unclear if a degree of CKD.  Creat slightly increased today.  --Resp : CHF/Asp PNA : EF 15 %--continue vent support and abtx.  --ETOH : continue rifampin and lactulose  --Follow up serum MOSES       66 yo man with HTN and known ETOH abuse admitted with AMS.  Evidence of cocaine use though pt denies.  --AMARJIT with unclear baseline function: in the setting of recent Cocaine use and ARB.    Continue to hold ARB for now.    D/c further IVF for now.  --Continue to monitor renal function and LFTS.  Continue to trend CPK.  --check repeat ammonia level.  --AMS : due to illicit drugs and ETOH.  --LE edema : check urine protein and Echo, franklin with ETOH hx.    1/20 MK   - AMARJIT improving     monitor off ivf     off arb    unclear if with component of ckd   - CHF with ef of 15%  - hypoxia with recent aspiration   - ? asymptomatic hyperuricemia : fu trend     and start allopurinol   dw dr bauer    1/21 MK   - AMARJIT improving     monitor off ivf     off arb    unclear if with component of ckd   - CHF with ef of 15%  - hypoxia with recent aspiration, no intubated     on zosyn renally dosed  - ? cirrhosis on rifampin   - ? asymptomatic hyperuricemia : fu trend      allopurinol started     1/22 MK   - AMARJIT improving     monitor off ivf     off arb    unclear if with component of ckd   - CHF with ef of 15%  - hypoxia with recent aspiration, no intubated     on zosyn renally dosed  - ? ETOH cirrhosis on rifampin and lactuose  - ? asymptomatic hyperuricemia : fu trend      allopurinol started, fu levels today   - FLC elevated, check MOSES send     1/23 MK   - AMARJIT improving     monitor off ivf     off arb    unclear if with component of ckd   - CHF with ef of 15%  - hypoxia with recent aspiration, now intubated     on zosyn renally dosed    planned for weaning of sedation   - ? ETOH cirrhosis on rifampin and lactuose  - ? asymptomatic hyperuricemia : fu trend      allopurinol started, fu levels today      will inc dose of allopurinol further   - FLC elevated, check MOSES send     does not require 24 hour collection   dw rn     1/24 MK   - AMARJIT improving and leveling off ? ckd at baseline   - CHF with ef of 15%  - hypoxia with recent aspiration, now intubated     on zosyn renally dosed    remains on sedation   - ? ETOH cirrhosis on rifampin and lactuose  - ? asymptomatic hyperuricemia : fu trend      allopurinol started,uric acid dec   - FLC elevated, check MOSES send     does not require 24 hour collection     will repeat FLC ordered when in AMARJIT     1/25 SY  --AMARJIT : improved.  Unclear if a degree of CKD.  Creat slightly increased today.  --Resp : CHF/Asp PNA : EF 15 %--continue vent support and abtx.  --ETOH : continue rifaximin and lactulose  --Follow up serum MOSES

## 2023-01-26 NOTE — PROGRESS NOTE ADULT - ASSESSMENT
68 yo m pmhx polysubstance abuse, ETOH abuse, goiter s/p thyroidectomy, HTN, HLD admitted 1/18 with ETOH withdrawal with course complicated by severe LV dysfunction, AMARJIT and elevated LFTs further complicated by hypoxic respiratory failure in setting of ams and aspiration requiring intubation and shock state requiring vasopressors.      NEURO:  Precedex as anxiolytic therapy.  CIWA on librium taper.   CV: Distributive shock requiring vasopressor therapy, actively titrating levophed for MAP >65, weaning as tolerated.  Midodrine as adjunctive therapy.  Hold parameters added.  SVT likely driven by temp, antipyretics and lopressor ivp as needed.    RESP: Hypoxic respiratory failure, ac/vc 4-6 cc/kg tv lung protective strategies, actively titrating settings for spo2 >92%  RENAL: AMARJIT, renally dose meds, trend urine output, bun/cr and electrolytes, replace lytes as needed.  leal in place.   GI: NPO. Transaminitis improving (?cocaine induced acute liver injury).  HE on lactulose and xifaxin. PPI therapy.    ENDO: Hypothyroidism on synthroid.    ID: Zosyn for coverage.    HEME: Heparin for vte ppx   DISPO: full code.      Critical Care time: 50 mins assessing presenting problems of acute illness that poses high probability of life threatening deterioration or end organ damage/dysfunction.  Medical decision making including Initiating plan of care, reviewing data, reviewing radiology, discussing with multidisciplinary team, non inclusive of procedures, discussing goals of care with patient/family

## 2023-01-26 NOTE — PROGRESS NOTE ADULT - SUBJECTIVE AND OBJECTIVE BOX
REASON FOR VISIT: CHF    HPI:  67 year old man with a history of HTN, thyroid disease, asthma, alcoholism, apparent drug use (urine tox: benzo + cocaine) admitted on 1/19/23 with altered mentation.  He was subsequently diagnosed with AMARJIT, CHF with severe LV dysfunction, hypoxia / respiratory failure (requiring mechanical ventilation) with suspected aspiration, septic shock, NSVT.    1/20/23 Patient  was receiving sedation , did vomit this morning , subsequent worsening of hypoxia , patient was intubated , patient noted to have severe ventricular systolic dysfunction , dilated aorta , as per his ex wife , patient was not seeing any physician for several  years , patient does drink regularly   patient cxr at the time of admission ,showed no evidence of pleural effusion , however  CXR showed bilateral pleural effusion ,   1/21/23 Patient remain intubated , sedated , mild hypotensive maintained with pressor low dose , improving oxygenation ,   improving renal , LFTS   1/22/23 Patient is remain same  , on lower dose of pressor , oxygen 40% intubated   1/23/'23: intubated sedated.  1/24/23 Patient is remain intubated , decreasing oxygen requirement ,  on pressor , monitor showed brief SVT episodes x 2 ,      and one episode of NSVT    1/25/23: Pt intubated and sedated. Occasional ectopy  1/26/23:  Sedated on vent; eyes open.    MEDICATIONS  (STANDING):  allopurinol 200 milliGRAM(s) Oral daily  chlordiazePOXIDE 25 milliGRAM(s) Oral four times a day  chlorhexidine 0.12% Liquid 15 milliLiter(s) Oral Mucosa every 12 hours  chlorhexidine 4% Liquid 1 Application(s) Topical <User Schedule>  dexMEDEtomidine Infusion 0.6 MICROgram(s)/kG/Hr (16.3 mL/Hr) IV Continuous <Continuous>  folic acid 1 milliGRAM(s) Oral daily  heparin   Injectable 5000 Unit(s) SubCutaneous every 8 hours  lactulose Syrup 20 Gram(s) Oral every 6 hours  levothyroxine 100 MICROGram(s) Oral daily  midodrine 10 milliGRAM(s) Oral every 8 hours  norepinephrine Infusion 0.1 MICROgram(s)/kG/Min (20.4 mL/Hr) IV Continuous <Continuous>  pantoprazole  Injectable 40 milliGRAM(s) IV Push daily  piperacillin/tazobactam IVPB.. 3.375 Gram(s) IV Intermittent every 8 hours  rifAXIMin 550 milliGRAM(s) Oral two times a day    MEDICATIONS  (PRN):  acetaminophen    Suspension .. 650 milliGRAM(s) Oral every 6 hours PRN Temp greater or equal to 38C (100.4F)  albuterol    90 MICROgram(s) HFA Inhaler 2 Puff(s) Inhalation every 6 hours PRN Shortness of Breath  metoprolol tartrate Injectable 5 milliGRAM(s) IV Push every 6 hours PRN HR >130  ondansetron Injectable 4 milliGRAM(s) IV Push every 8 hours PRN Nausea and/or Vomiting  sodium chloride 0.9% lock flush 10 milliLiter(s) IV Push every 1 hour PRN Pre/post blood products, medications, blood draw, and to maintain line patency    Vital Signs Last 24 Hrs  T(C): 36.4 (26 Jan 2023 08:59), Max: 39.3 (26 Jan 2023 00:30)  T(F): 97.6 (26 Jan 2023 08:59), Max: 102.7 (26 Jan 2023 00:30)  HR: 116 (26 Jan 2023 10:00) (79 - 121)  RR: 21 (26 Jan 2023 10:00) (14 - 36)  SpO2: 100% (26 Jan 2023 10:00) (97% - 100%)    PHYSICAL EXAM:  Constitutional: intubated, sedated  Respiratory: Breath sounds are clear bilaterally, No wheezing, rales or rhonchi  Cardiovascular: S1 and S2, regular  Gastrointestinal: Abdomen is distended, soft  Extremities: 1-2+ Edema    LABS:              14.0   9.38  )-----------( 118      ( 26 Jan 2023 06:08 )             43.9     147<H>  |  118<H>  |  51<H>  ----------------------------<  161<H>  3.7   |  22  |  1.81<H>    Ca    7.8<L>      26 Jan 2023 06:08  Phos  3.5     01-26  Mg     2.0     01-26    TPro  6.6  /  Alb  2.1<L>  /  TBili  0.6  /  DBili  x   /  AST  121<H>  /  ALT  166<H>  /  AlkPhos  89  01-25    12 Lead ECG (01.18.23 @ 20:57): Sinus tachycardia with Premature supraventricular complexes, Septal infarct , age undetermined    TTE Echo Complete w/o Contrast w/ Doppler (01.20.23 @ 11:00):   Normal appearing mitral valve structure and function. Mild mitral annular calcification. Mild (1+) mitral regurgitation.   Normal aortic valve structure and function.   The ascending aorta and aortic root are dilated.   At least Mild (1+) aortic regurgitation is present.   Normal appearing tricuspid valve structure. At least Mild (1+) tricuspid valve regurgitation is present.   Normal appearing pulmonic valve structure and function.   The left atrium is mildly dilated.   Severely reduced left ventricular systolic function. The left ventricle cavity is dilated. Estimated left ventricular ejection fraction is 15-20 %.   The right atrium appears mildly dilated.   Normal appearing right ventricle function.   The IVC is dilated with decreased respiratory variation.

## 2023-01-26 NOTE — PROGRESS NOTE ADULT - ASSESSMENT
67 yr old with PMHx of polysubstance abuse, etoh abuse, goiter s/p thyroidectomy, HTN, HLD admitted on 1/18 with hallucinations due to etoh withdrawal found to have severe LV dysfunction and AMARJIT of unclear duration, transferred to CCU on 1/20 with mixed respiratory failure due to aspiration pneumonitis and worsening mental status requiring intubation. Post intubation c/b shock requiring pressors.  LVEF 15-20%.  Brief runs of SVT and NSVT on telemetry.    A/P: Acute hypoxic resp failure, drug induced encephalopathy, acute on chronic HFrEF 15-20%, PSVT and NSVT  Continue tele monitoring, having runs of PAF with RVR, SVT, NSVT  Still on pressors: Suggest Mario in place of levophed  Will start Amio loading IV, monitor LFTs and TFTs  EKG to assess QTc  LVEF 15-20%  When pt is more stable consider ischemic workup  GDMT when stable per cardiology  Keep lytes repleted K>4, Mg>2  EP will follow  Discussed with RN, Dr. Doe, CCU staff 67 yr old with PMHx of polysubstance abuse, etoh abuse, goiter s/p thyroidectomy, HTN, HLD admitted on 1/18 with hallucinations due to etoh withdrawal found to have severe LV dysfunction and AMARJIT of unclear duration, transferred to CCU on 1/20 with mixed respiratory failure due to aspiration pneumonitis and worsening mental status requiring intubation. Post intubation c/b shock requiring pressors.  LVEF 15-20%.  Brief runs of SVT and NSVT on telemetry.    A/P: Acute hypoxic resp failure, drug induced encephalopathy, acute on chronic HFrEF 15-20%, PSVT and NSVT  Continue tele monitoring, having runs of PAF with RVR, SVT, NSVT  Still on pressors: Suggest Mario in place of levophed  Will start Amio loading IV, monitor LFTs and TFTs  EKG to assess QTc  ?anticoagulation, currently on Heparin SQ  LVEF 15-20%  When pt is more stable consider ischemic workup  GDMT when stable per cardiology  Keep lytes repleted K>4, Mg>2  EP will follow  Discussed with RN, Dr. Doe, CCU staff

## 2023-01-26 NOTE — PROGRESS NOTE ADULT - SUBJECTIVE AND OBJECTIVE BOX
HPI:   67 year old male with PMHx of HTN, Thyroid Goiter s/p thyroidectomy, HLD BIBEMS from home with AMS. Pt's ex wife concerned about pt reporting that he has been acting strange at home and that he has been saying strange things to their 11 year old son. Pt reports that he is depressed and that he owes money to the IRS. As per EMS pt is on xanax, Ambien and melatonin. Pt denies fever but endorses difficulty breathing, seeing things that other people do not. Pt has not taken xanax in 3 or 4 days. Pt drinks EtOH but denies withdrawal symptoms, non-smoker. Denies SI. Ex wife states speech was slurred. Patient was admitted with altered mental status. Patient was seen in the room and doing better. Patient knows that he is in hospital. Denies any complaints of chest pain, shortness of breath, headache, dizziness, nausea, vomiting or abdominal pain. No fall or head injury. Denies using any drugs.       1/24/23:  EP asked to evaluate this pt with above admission history.  Currently intubated and sedated on Levophed and Midodrine for hypotension.  LVEF 15-20%  TELE: SR 80-90 bpm, PVCs, brief runs SVT and NSVT  EKG: sinus tachycardia 106bpm, APC, [place] 160ms, QRS 116ms, QTc 475ms    1/26/23: pt successfully extubated this afternoon.  Seen bedside, family present, opens eyes and follows some commands HR remains elevated, frequent ventricular ectopy.  On levophed and midodrine for BP support.  SVT overnight treated with low dose IV lopressor  TELE:  Atrial fib/flutter    MEDICATIONS  (STANDING):  allopurinol 200 milliGRAM(s) Oral daily  aMIOdarone Infusion 1 mG/Min (33.3 mL/Hr) IV Continuous <Continuous>  aMIOdarone IVPB 150 milliGRAM(s) IV Intermittent once  chlordiazePOXIDE 25 milliGRAM(s) Oral four times a day  chlorhexidine 4% Liquid 1 Application(s) Topical <User Schedule>  dexMEDEtomidine Infusion 0.6 MICROgram(s)/kG/Hr (16.3 mL/Hr) IV Continuous <Continuous>  folic acid 1 milliGRAM(s) Oral daily  heparin   Injectable 5000 Unit(s) SubCutaneous every 8 hours  lactulose Syrup 20 Gram(s) Oral every 6 hours  levothyroxine 100 MICROGram(s) Oral daily  LORazepam   Injectable 2 milliGRAM(s) IV Push once  midodrine 10 milliGRAM(s) Oral every 8 hours  norepinephrine Infusion 0.1 MICROgram(s)/kG/Min (20.4 mL/Hr) IV Continuous <Continuous>  pantoprazole  Injectable 40 milliGRAM(s) IV Push daily  phenylephrine    Infusion 0.2 MICROgram(s)/kG/Min (8.17 mL/Hr) IV Continuous <Continuous>  piperacillin/tazobactam IVPB.. 3.375 Gram(s) IV Intermittent every 8 hours  rifAXIMin 550 milliGRAM(s) Oral two times a day    MEDICATIONS  (PRN):  acetaminophen    Suspension .. 650 milliGRAM(s) Oral every 6 hours PRN Temp greater or equal to 38C (100.4F)  albuterol    90 MICROgram(s) HFA Inhaler 2 Puff(s) Inhalation every 6 hours PRN Shortness of Breath  metoprolol tartrate Injectable 5 milliGRAM(s) IV Push every 6 hours PRN HR >130  ondansetron Injectable 4 milliGRAM(s) IV Push every 8 hours PRN Nausea and/or Vomiting  sodium chloride 0.9% lock flush 10 milliLiter(s) IV Push every 1 hour PRN Pre/post blood products, medications, blood draw, and to maintain line patency   HPI:   67 year old male with PMHx of HTN, Thyroid Goiter s/p thyroidectomy, HLD BIBEMS from home with AMS. Pt's ex wife concerned about pt reporting that he has been acting strange at home and that he has been saying strange things to their 11 year old son. Pt reports that he is depressed and that he owes money to the IRS. As per EMS pt is on xanax, Ambien and melatonin. Pt denies fever but endorses difficulty breathing, seeing things that other people do not. Pt has not taken xanax in 3 or 4 days. Pt drinks EtOH but denies withdrawal symptoms, non-smoker. Denies SI. Ex wife states speech was slurred. Patient was admitted with altered mental status. Patient was seen in the room and doing better. Patient knows that he is in hospital. Denies any complaints of chest pain, shortness of breath, headache, dizziness, nausea, vomiting or abdominal pain. No fall or head injury. Denies using any drugs.       1/24/23:  EP asked to evaluate this pt with above admission history.  Currently intubated and sedated on Levophed and Midodrine for hypotension.  LVEF 15-20%  TELE: SR 80-90 bpm, PVCs, brief runs SVT and NSVT  EKG: sinus tachycardia 106bpm, APC, [place] 160ms, QRS 116ms, QTc 475ms    1/26/23: pt successfully extubated this afternoon.  Seen bedside, family present, opens eyes and follows some commands HR remains elevated, frequent ventricular ectopy.  On levophed and midodrine for BP support.  SVT overnight treated with low dose IV lopressor  TELE:  Atrial fib/flutter rate 120-140's, frequent ectopy, in sinus rhythm earlier  EKG:    MEDICATIONS  (STANDING):  allopurinol 200 milliGRAM(s) Oral daily  aMIOdarone Infusion 1 mG/Min (33.3 mL/Hr) IV Continuous <Continuous>  aMIOdarone IVPB 150 milliGRAM(s) IV Intermittent once  chlordiazePOXIDE 25 milliGRAM(s) Oral four times a day  chlorhexidine 4% Liquid 1 Application(s) Topical <User Schedule>  dexMEDEtomidine Infusion 0.6 MICROgram(s)/kG/Hr (16.3 mL/Hr) IV Continuous <Continuous>  folic acid 1 milliGRAM(s) Oral daily  heparin   Injectable 5000 Unit(s) SubCutaneous every 8 hours  lactulose Syrup 20 Gram(s) Oral every 6 hours  levothyroxine 100 MICROGram(s) Oral daily  LORazepam   Injectable 2 milliGRAM(s) IV Push once  midodrine 10 milliGRAM(s) Oral every 8 hours  norepinephrine Infusion 0.1 MICROgram(s)/kG/Min (20.4 mL/Hr) IV Continuous <Continuous>  pantoprazole  Injectable 40 milliGRAM(s) IV Push daily  phenylephrine    Infusion 0.2 MICROgram(s)/kG/Min (8.17 mL/Hr) IV Continuous <Continuous>  piperacillin/tazobactam IVPB.. 3.375 Gram(s) IV Intermittent every 8 hours  rifAXIMin 550 milliGRAM(s) Oral two times a day    MEDICATIONS  (PRN):  acetaminophen    Suspension .. 650 milliGRAM(s) Oral every 6 hours PRN Temp greater or equal to 38C (100.4F)  albuterol    90 MICROgram(s) HFA Inhaler 2 Puff(s) Inhalation every 6 hours PRN Shortness of Breath  metoprolol tartrate Injectable 5 milliGRAM(s) IV Push every 6 hours PRN HR >130  ondansetron Injectable 4 milliGRAM(s) IV Push every 8 hours PRN Nausea and/or Vomiting  sodium chloride 0.9% lock flush 10 milliLiter(s) IV Push every 1 hour PRN Pre/post blood products, medications, blood draw, and to maintain line patency    Allergies    No Known Allergies      REVIEW OF SYSTEMS: denies CP or SOB , limited ability to answer questions due to condition      Vital Signs Last 24 Hrs  T(C): 36.4 (26 Jan 2023 08:59), Max: 39.3 (26 Jan 2023 00:30)  T(F): 97.6 (26 Jan 2023 08:59), Max: 102.7 (26 Jan 2023 00:30)  HR: 149 (26 Jan 2023 16:30) (81 - 149)  BP: --  BP(mean): --  RR: 25 (26 Jan 2023 16:30) (14 - 38)  SpO2: 98% (26 Jan 2023 16:30) (83% - 100%)                          14.0   9.38  )-----------( 118      ( 26 Jan 2023 06:08 )             43.9     01-26    147<H>  |  118<H>  |  51<H>  ----------------------------<  161<H>  3.7   |  22  |  1.81<H>    Ca    7.8<L>      26 Jan 2023 06:08  Phos  3.5     01-26  Mg     2.0     01-26    TPro  6.6  /  Alb  2.1<L>  /  TBili  0.6  /  DBili  x   /  AST  121<H>  /  ALT  166<H>  /  AlkPhos  89  01-25    LIVER FUNCTIONS - ( 25 Jan 2023 21:45 )  Alb: 2.1 g/dL / Pro: 6.6 gm/dL / ALK PHOS: 89 U/L / ALT: 166 U/L / AST: 121 U/L / GGT: x           PHYSICAL EXAM:    General: Extubated, eyes open, nods head, follow some commands  Neurology:  nonfocal, FOX x 4  HEENT: NC/AT, neck supple, no JVD, EOMI, PERRL  Respiratory: CTA B/L  CV: RRR, S1S2, no murmurs, rubs or gallops  Abdominal: Soft, NT, ND +BS  Extremities: + edema upper and lower ext  Skin: No rashes    CARDIAC TESTS:  < from: TTE Echo Complete w/o Contrast w/ Doppler (01.20.23 @ 11:00) >   Impression     Summary     Normal appearing mitral valve structure and function.   Mild mitral annular calcification is present.   Mild (1+) mitral regurgitation is present.   Normal aortic valve structure and function.   The ascending aorta and aortic root are dilated.   At least Mild (1+) aortic regurgitation is present.   Normal appearing tricuspid valve structure.   At least Mild (1+) tricuspid valve regurgitation is present.   Normal appearing pulmonic valve structure and function.   The left atrium is mildly dilated.   Severely reduced left ventricular systolic function.   The left ventricle cavity is dilated.   Estimated left ventricular ejection fraction is 15-20 %.   Dr. Will aware of study findings.   The right atrium appears mildly dilated.   Normal appearing right ventricle function.   The IVC is dilated with decreased respiratory variation.     Recommendation     Hospitalist team aware of study findings.   HPI:   67 year old male with PMHx of HTN, Thyroid Goiter s/p thyroidectomy, HLD BIBEMS from home with AMS. Pt's ex wife concerned about pt reporting that he has been acting strange at home and that he has been saying strange things to their 11 year old son. Pt reports that he is depressed and that he owes money to the IRS. As per EMS pt is on xanax, Ambien and melatonin. Pt denies fever but endorses difficulty breathing, seeing things that other people do not. Pt has not taken xanax in 3 or 4 days. Pt drinks EtOH but denies withdrawal symptoms, non-smoker. Denies SI. Ex wife states speech was slurred. Patient was admitted with altered mental status. Patient was seen in the room and doing better. Patient knows that he is in hospital. Denies any complaints of chest pain, shortness of breath, headache, dizziness, nausea, vomiting or abdominal pain. No fall or head injury. Denies using any drugs.       1/24/23:  EP asked to evaluate this pt with above admission history.  Currently intubated and sedated on Levophed and Midodrine for hypotension.  LVEF 15-20%  TELE: SR 80-90 bpm, PVCs, brief runs SVT and NSVT  EKG: sinus tachycardia 106bpm, APC, [place] 160ms, QRS 116ms, QTc 475ms    1/26/23: pt successfully extubated this afternoon.  Seen bedside, family present, opens eyes and follows some commands HR remains elevated, frequent ventricular ectopy.  On levophed and midodrine for BP support.  SVT overnight treated with low dose IV lopressor  TELE:  Atrial fib/flutter rate 120-140's, frequent ectopy, in sinus rhythm earlier  EKG: ?atrial flutter 133 bpm    MEDICATIONS  (STANDING):  allopurinol 200 milliGRAM(s) Oral daily  aMIOdarone Infusion 1 mG/Min (33.3 mL/Hr) IV Continuous <Continuous>  aMIOdarone IVPB 150 milliGRAM(s) IV Intermittent once  chlordiazePOXIDE 25 milliGRAM(s) Oral four times a day  chlorhexidine 4% Liquid 1 Application(s) Topical <User Schedule>  dexMEDEtomidine Infusion 0.6 MICROgram(s)/kG/Hr (16.3 mL/Hr) IV Continuous <Continuous>  folic acid 1 milliGRAM(s) Oral daily  heparin   Injectable 5000 Unit(s) SubCutaneous every 8 hours  lactulose Syrup 20 Gram(s) Oral every 6 hours  levothyroxine 100 MICROGram(s) Oral daily  LORazepam   Injectable 2 milliGRAM(s) IV Push once  midodrine 10 milliGRAM(s) Oral every 8 hours  norepinephrine Infusion 0.1 MICROgram(s)/kG/Min (20.4 mL/Hr) IV Continuous <Continuous>  pantoprazole  Injectable 40 milliGRAM(s) IV Push daily  phenylephrine    Infusion 0.2 MICROgram(s)/kG/Min (8.17 mL/Hr) IV Continuous <Continuous>  piperacillin/tazobactam IVPB.. 3.375 Gram(s) IV Intermittent every 8 hours  rifAXIMin 550 milliGRAM(s) Oral two times a day    MEDICATIONS  (PRN):  acetaminophen    Suspension .. 650 milliGRAM(s) Oral every 6 hours PRN Temp greater or equal to 38C (100.4F)  albuterol    90 MICROgram(s) HFA Inhaler 2 Puff(s) Inhalation every 6 hours PRN Shortness of Breath  metoprolol tartrate Injectable 5 milliGRAM(s) IV Push every 6 hours PRN HR >130  ondansetron Injectable 4 milliGRAM(s) IV Push every 8 hours PRN Nausea and/or Vomiting  sodium chloride 0.9% lock flush 10 milliLiter(s) IV Push every 1 hour PRN Pre/post blood products, medications, blood draw, and to maintain line patency    Allergies    No Known Allergies      REVIEW OF SYSTEMS: denies CP or SOB , limited ability to answer questions due to condition      Vital Signs Last 24 Hrs  T(C): 36.4 (26 Jan 2023 08:59), Max: 39.3 (26 Jan 2023 00:30)  T(F): 97.6 (26 Jan 2023 08:59), Max: 102.7 (26 Jan 2023 00:30)  HR: 149 (26 Jan 2023 16:30) (81 - 149)  BP: --  BP(mean): --  RR: 25 (26 Jan 2023 16:30) (14 - 38)  SpO2: 98% (26 Jan 2023 16:30) (83% - 100%)                          14.0   9.38  )-----------( 118      ( 26 Jan 2023 06:08 )             43.9     01-26    147<H>  |  118<H>  |  51<H>  ----------------------------<  161<H>  3.7   |  22  |  1.81<H>    Ca    7.8<L>      26 Jan 2023 06:08  Phos  3.5     01-26  Mg     2.0     01-26    TPro  6.6  /  Alb  2.1<L>  /  TBili  0.6  /  DBili  x   /  AST  121<H>  /  ALT  166<H>  /  AlkPhos  89  01-25    LIVER FUNCTIONS - ( 25 Jan 2023 21:45 )  Alb: 2.1 g/dL / Pro: 6.6 gm/dL / ALK PHOS: 89 U/L / ALT: 166 U/L / AST: 121 U/L / GGT: x           PHYSICAL EXAM:    General: Extubated, eyes open, nods head, follow some commands  Neurology:  nonfocal, FOX x 4  HEENT: NC/AT, neck supple, no JVD, EOMI, PERRL  Respiratory: CTA B/L  CV: RRR, S1S2, no murmurs, rubs or gallops  Abdominal: Soft, NT, ND +BS  Extremities: + edema upper and lower ext  Skin: No rashes    CARDIAC TESTS:  < from: TTE Echo Complete w/o Contrast w/ Doppler (01.20.23 @ 11:00) >   Impression     Summary     Normal appearing mitral valve structure and function.   Mild mitral annular calcification is present.   Mild (1+) mitral regurgitation is present.   Normal aortic valve structure and function.   The ascending aorta and aortic root are dilated.   At least Mild (1+) aortic regurgitation is present.   Normal appearing tricuspid valve structure.   At least Mild (1+) tricuspid valve regurgitation is present.   Normal appearing pulmonic valve structure and function.   The left atrium is mildly dilated.   Severely reduced left ventricular systolic function.   The left ventricle cavity is dilated.   Estimated left ventricular ejection fraction is 15-20 %.   Dr. Will aware of study findings.   The right atrium appears mildly dilated.   Normal appearing right ventricle function.   The IVC is dilated with decreased respiratory variation.     Recommendation     Hospitalist team aware of study findings.

## 2023-01-26 NOTE — PROGRESS NOTE ADULT - ASSESSMENT
66 yo man with HTN and known ETOH abuse admitted with AMS.  Evidence of cocaine use though pt denies.  --AMARJIT with unclear baseline function: in the setting of recent Cocaine use and ARB.    Continue to hold ARB for now.    D/c further IVF for now.  --Continue to monitor renal function and LFTS.  Continue to trend CPK.  --check repeat ammonia level.  --AMS : due to illicit drugs and ETOH.  --LE edema : check urine protein and Echo, [place] with ETOH hx.    1/20 MK   - AMARJIT improving     monitor off ivf     off arb    unclear if with component of ckd   - CHF with ef of 15%  - hypoxia with recent aspiration   - ? asymptomatic hyperuricemia : fu trend     and start allopurinol   dw dr bauer    1/21 MK   - AMARJIT improving     monitor off ivf     off arb    unclear if with component of ckd   - CHF with ef of 15%  - hypoxia with recent aspiration, no intubated     on zosyn renally dosed  - ? cirrhosis on rifampin   - ? asymptomatic hyperuricemia : fu trend      allopurinol started     1/22 MK   - AMARJIT improving     monitor off ivf     off arb    unclear if with component of ckd   - CHF with ef of 15%  - hypoxia with recent aspiration, no intubated     on zosyn renally dosed  - ? ETOH cirrhosis on rifampin and lactuose  - ? asymptomatic hyperuricemia : fu trend      allopurinol started, fu levels today   - FLC elevated, check MOSES send     1/23 MK   - AMARJIT improving     monitor off ivf     off arb    unclear if with component of ckd   - CHF with ef of 15%  - hypoxia with recent aspiration, now intubated     on zosyn renally dosed    planned for weaning of sedation   - ? ETOH cirrhosis on rifampin and lactuose  - ? asymptomatic hyperuricemia : fu trend      allopurinol started, fu levels today      will inc dose of allopurinol further   - FLC elevated, check MOSES send     does not require 24 hour collection   dw rn     1/24 MK   - AMARJIT improving and leveling off ? ckd at baseline   - CHF with ef of 15%  - hypoxia with recent aspiration, now intubated     on zosyn renally dosed    remains on sedation   - ? ETOH cirrhosis on rifampin and lactuose  - ? asymptomatic hyperuricemia : fu trend      allopurinol started,uric acid dec   - FLC elevated, check MOSES send     does not require 24 hour collection     will repeat FLC ordered when in AMARJIT     1/25 SY  --AMARJIT : improved.  Unclear if a degree of CKD.  Creat slightly increased today.  --Resp : CHF/Asp PNA : EF 15 %--continue vent support and abtx.  --ETOH : continue rifaximin and lactulose  --Follow up serum MOSES    1/26 SY  --AMARJIT : improved and holding with unclear baseline fx.  --Resp : CHF/ asp PNA : EF 15 %   --continue vent support.     I >> O --Continue water flush due to increased sodium level.     Hold diuresis for now.

## 2023-01-26 NOTE — PROGRESS NOTE ADULT - SUBJECTIVE AND OBJECTIVE BOX
66 y/o male with pmhx of polysubstance abuse, etoh abuse, goiter s/p thyroidectomy, HTN, HLD admitted on 1/18 with hallucinations due to etoh withdrawal found to have severe LV dysfunction and AMARJIT of unclear duration transferred to CCU on 1/20 with mixed respiratory failure due to aspiration pneumonitis and worsening mental status requiring intubation. post intubation c/b shock requiring pressors.   Case discussed on CCU round and with the family.  1/24  Cased discussed on CCU rounds, SBT ongoing  1/25  Case discussed on CCU rounds.  Clinically improved. On SBT.  1/26 Case discussed on CCU rounds, extubated, family updated.      ICU Vital Signs Last 24 Hrs  T(C): 36.4 (26 Jan 2023 08:59), Max: 39.3 (26 Jan 2023 00:30)  T(F): 97.6 (26 Jan 2023 08:59), Max: 102.7 (26 Jan 2023 00:30)  HR: 123 (26 Jan 2023 12:00) (81 - 124)  ABP: 84/59 (26 Jan 2023 12:00) (82/54 - 108/80)  ABP(mean): 68 (26 Jan 2023 12:00) (63 - 90)  RR: 38 (26 Jan 2023 12:00) (14 - 38)  SpO2: 100% (26 Jan 2023 12:00) (99% - 100%)    O2 Parameters below as of 25 Jan 2023 16:00  Patient On (Oxygen Delivery Method): BiPAP/CPAP    O2 Concentration (%): 40                                14.0   9.38  )-----------( 118      ( 26 Jan 2023 06:08 )             43.9         01-26    147<H>  |  118<H>  |  51<H>  ----------------------------<  161<H>  3.7   |  22  |  1.81<H>    Ca    7.8<L>      26 Jan 2023 06:08  Phos  3.5     01-26  Mg     2.0     01-26    TPro  6.6  /  Alb  2.1<L>  /  TBili  0.6  /  DBili  x   /  AST  121<H>  /  ALT  166<H>  /  AlkPhos  89  01-25    LIVER FUNCTIONS - ( 25 Jan 2023 21:45 )  Alb: 2.1 g/dL / Pro: 6.6 gm/dL / ALK PHOS: 89 U/L / ALT: 166 U/L / AST: 121 U/L / GGT: x             PT/INR - ( 26 Jan 2023 06:08 )   PT: 16.1 sec;   INR: 1.38 ratio         PTT - ( 26 Jan 2023 06:08 )  PTT:41.1 sec

## 2023-01-26 NOTE — CHART NOTE - NSCHARTNOTEFT_GEN_A_CORE
Passed SBT, will extubate    Order placed Passed SBT, will extubate    Order placed    ICU Progress Note    HPI:    S:    Pt seen and examined  HD # 9  FULL CODE  67M   PMHx of HTN, Thyroid Goiter s/p thyroidectomy, HLD BIBEMS from home with AMS. Pt's ex wife concerned about pt reporting that he has been acting strange at home and that he has been saying strange things to their 11 year old son. Pt reports that he is depressed and that he owes money to the IRS. As per EMS pt is on xanax, Ambien and melatonin. Pt denies fever but endorses difficulty breathing, seeing things that other people do not. Pt has not taken xanax in 3 or 4 days. Pt drinks EtOH but denies withdrawal symptoms, non-smoker. Denies SI. Ex wife states speech was slurred. Patient was admitted with altered mental status. Patient was seen in the room and doing better. Patient knows that he is in hospital. Denies any complaints of chest pain, shortness of breath, headache, dizziness, nausea, vomiting or abdominal pain. No fall or head injury. Denies using any drugs    ICU consult today for resp distress s/p episode vomiting    1/20 PM: In resp distress, near resp arrest; intubated. Central vascular access obtained. Pressors started.   1/22 PM: Remains intubated, on levophed. On precedex drip and ATC BZD.    1/26 PM: Weaned to extubate today. Remains on pressors.    ROS: Unable to obtain    Allergies    No Known Allergies    Intolerances    MEDICATIONS  (STANDING):    allopurinol 200 milliGRAM(s) Oral daily  aMIOdarone Infusion 1 mG/Min (33.3 mL/Hr) IV Continuous <Continuous>  aMIOdarone IVPB 150 milliGRAM(s) IV Intermittent once  chlordiazePOXIDE 25 milliGRAM(s) Oral four times a day  chlorhexidine 4% Liquid 1 Application(s) Topical <User Schedule>  dexMEDEtomidine Infusion 0.6 MICROgram(s)/kG/Hr (16.3 mL/Hr) IV Continuous <Continuous>  folic acid 1 milliGRAM(s) Oral daily  heparin   Injectable 5000 Unit(s) SubCutaneous every 8 hours  lactulose Syrup 20 Gram(s) Oral every 6 hours  levothyroxine 100 MICROGram(s) Oral daily  LORazepam   Injectable 2 milliGRAM(s) IV Push once  midodrine 10 milliGRAM(s) Oral every 8 hours  norepinephrine Infusion 0.1 MICROgram(s)/kG/Min (20.4 mL/Hr) IV Continuous <Continuous>  pantoprazole  Injectable 40 milliGRAM(s) IV Push daily  phenylephrine    Infusion 0.2 MICROgram(s)/kG/Min (8.17 mL/Hr) IV Continuous <Continuous>  piperacillin/tazobactam IVPB.. 3.375 Gram(s) IV Intermittent every 8 hours  rifAXIMin 550 milliGRAM(s) Oral two times a day    MEDICATIONS  (PRN):    acetaminophen    Suspension .. 650 milliGRAM(s) Oral every 6 hours PRN Temp greater or equal to 38C (100.4F)  albuterol    90 MICROgram(s) HFA Inhaler 2 Puff(s) Inhalation every 6 hours PRN Shortness of Breath  metoprolol tartrate Injectable 5 milliGRAM(s) IV Push every 6 hours PRN HR >130  ondansetron Injectable 4 milliGRAM(s) IV Push every 8 hours PRN Nausea and/or Vomiting  sodium chloride 0.9% lock flush 10 milliLiter(s) IV Push every 1 hour PRN Pre/post blood products, medications, blood draw, and to maintain line patency    Drug Dosing Weight    Weight (kg): 108.9 (18 Jan 2023 17:31)    PAST MEDICAL & SURGICAL HISTORY:    Intranasal mass  Hypertension, unspecified type  Hyperlipidemia, unspecified hyperlipidemia type  Thyroid nodule  left thyroidectomy  Asthma  H/O partial thyroidectomy  2008  S/P T&amp;A (status post tonsillectomy and adenoidectomy)  1965    FAMILY HISTORY:    ROS: See HPI; otherwise, all systems reviewed and negative.    O:    ICU Vital Signs Last 24 Hrs  T(C): 36.4 (26 Jan 2023 08:59), Max: 39.3 (26 Jan 2023 00:30)  T(F): 97.6 (26 Jan 2023 08:59), Max: 102.7 (26 Jan 2023 00:30)  HR: 131 (26 Jan 2023 15:30) (81 - 141)  BP: --  BP(mean): --  ABP: 92/67 (26 Jan 2023 15:30) (82/54 - 108/80)  ABP(mean): 75 (26 Jan 2023 15:30) (63 - 111)  RR: 24 (26 Jan 2023 15:30) (14 - 38)  SpO2: 97% (26 Jan 2023 15:30) (83% - 100%)    I&O's Detail    25 Jan 2023 07:01  -  26 Jan 2023 07:00  --------------------------------------------------------  IN:    Dexmedetomidine: 525.6 mL    Enteral Tube Flush: 1645 mL    Free Water: 320 mL    IV PiggyBack: 650 mL    Nepro with Carb Steady: 1540 mL    Norepinephrine: 245.4 mL    Sodium Chloride 0.9% Bolus: 1000 mL  Total IN: 5926 mL    OUT:    Indwelling Catheter - Urethral (mL): 1570 mL    Rectal Tube (mL): 1200 mL  Total OUT: 2770 mL    Total NET: 3156 mL    26 Jan 2023 07:01  -  26 Jan 2023 16:19  --------------------------------------------------------  IN:    Dexmedetomidine: 109.2 mL    Enteral Tube Flush: 300 mL    Nepro with Carb Steady: 280 mL    Norepinephrine: 22.4 mL  Total IN: 711.6 mL    OUT:  Total OUT: 0 mL    Total NET: 711.6 mL    Mode: standby    PE:    Adult M lying in bed  No JVD trachea midline  + ETT in place connected to ventilator  S1S2+  Coarse BS B/L  Abd soft NTND  No leg swelling/edema noted  Intubated, sedated  Skin pink and warm    LABS:    CBC Full  -  ( 26 Jan 2023 06:08 )  WBC Count : 9.38 K/uL  RBC Count : 4.56 M/uL  Hemoglobin : 14.0 g/dL  Hematocrit : 43.9 %  Platelet Count - Automated : 118 K/uL  Mean Cell Volume : 96.3 fl  Mean Cell Hemoglobin : 30.7 pg  Mean Cell Hemoglobin Concentration : 31.9 gm/dL  Auto Neutrophil # : x  Auto Lymphocyte # : x  Auto Monocyte # : x  Auto Eosinophil # : x  Auto Basophil # : x  Auto Neutrophil % : x  Auto Lymphocyte % : x  Auto Monocyte % : x  Auto Eosinophil % : x  Auto Basophil % : x    01-26    147<H>  |  118<H>  |  51<H>  ----------------------------<  161<H>  3.7   |  22  |  1.81<H>    Ca    7.8<L>      26 Jan 2023 06:08  Phos  3.5     01-26  Mg     2.0     01-26    TPro  6.6  /  Alb  2.1<L>  /  TBili  0.6  /  DBili  x   /  AST  121<H>  /  ALT  166<H>  /  AlkPhos  89  01-25    PT/INR - ( 26 Jan 2023 06:08 )   PT: 16.1 sec;   INR: 1.38 ratio      PTT - ( 26 Jan 2023 06:08 )  PTT:41.1 sec    CAPILLARY BLOOD GLUCOSE    LIVER FUNCTIONS - ( 25 Jan 2023 21:45 )  Alb: 2.1 g/dL / Pro: 6.6 gm/dL / ALK PHOS: 89 U/L / ALT: 166 U/L / AST: 121 U/L / GGT: x           A:    67M  HD # 10  FULL CODE    Here for:    1. Acute mixed resp failure 2/2  2. PNA, aspiration  3. Shock, suspect septic 2/2 above  4. Alcohol withdrawal syndrome  5. Alcoholic hepatitis  6. CM, suspect alcohol induced  7. AMARJIT ? CKD  8. Elevated ammonia  9. TODD, ectopy    This patient requires critical care for support of one or more vital organ systems with a high probability of imminent or life threatening deterioration in his/her condition    P:    Alcohol withdrawal syndrome  Complicated by aspiration PNA, septic shock, severe CM, MSOF    Pt required/s my immediate and continued care and presence     Analgosedation with precedex and ATC + PRN BZD taper; avoid propofol in shock state and severe CM. Daily sedation vacations. Goal RASS 0 to -2.  HD monitoring, on levophed to maintain MAP > 65; EF < 15%; severe LV dysfxn with global dysfxn; ?alcohol +/- cocaine use; will need further w/u when stabilized, cardiology involved, plan per Cards; start neosyenphrine today to help wean off levophed, as thought is beta agonism from levophed may be contributing to TODD/ectopy; d/c levo when ramo started; start amiodarone drip after 150mg bolus  s/p intubation, actively titrating and manipulating ventilator to optimize ventilation and oxygenation, acid-base status while minimizing barotrauma; wean today, daily SBTs in concert with sedation vacations  Continue TF's  Lactulose + rifaxamin for hyperammonia, increase from 10mg BID to 20mg QID, titrate to 4 BM daily; lvl unchanged  Broad spectrum abx with zosyn empiric coverage for asp PNA, f/u cultures, send RC  VTE ppx SCD + SQH  Vitamin K for elevated INR  AMARJIT, ? CKD, no indications for emergent RRT; place leal, monitor I/O's, avoid renal toxic meds; bicarb OK  Alcoholic hepatitis, GI involved, some improvement  f/u labs, replete lytes PRN  Central vascular access obtained, maintain for now  Hgb 14, Plt 118  f/u labs, replete lytes PRN  Maintain K > 4 Mg Ph > 3 Mg > 2    Dispo: Continue critical care. Continue titrating BZDs, abx. SBTs, wean vent as able, hopeful to extubate today; optimize cardiac chronotropy/ectopy    TOTAL CRITICAL CARE TIME:  60/110 minutes (EXCLUSIVE of any non bundled procedures)    Note: This time spent INCLUDES time spent directly as this patient's bedside with evaluation, review of chart including review of laboratory and imaging studies, interpretation of vital signs and cardiac output measurements, any necessary ventilator management, and time spent discussing plan of care with patient and family, including goals of care discussion.

## 2023-01-26 NOTE — PROGRESS NOTE ADULT - SUBJECTIVE AND OBJECTIVE BOX
NEPHROLOGY INTERVAL HPI/OVERNIGHT EVENTS:    Date of Service: 23 @ 10:13    --Remains on vent.  Sedation and on pressors.  UO 2700cc  --Remains on vent. Remains on pressors. UO 1300cc.   intubated, sedation to be weaned off, oxygenating well + uop    intubated and sedated, remains on pressors    intubated yesterday on precedex and pressors   pt with episode of emesis with hypoxia, concern for aspiration with adjustment of his ativan dose.  wife at bedside.  unofficial echo with ef of 15%  HPI: Hx obtained from ex wife.  66 yo man with PMHX of HTN,( on Losartan), hx of asthma, living alone, brought to Ed when found to be confused with hallucinations.  Per pt's wife, pt was well until a few days ago.  Yesterday noted with slurred speech and confusion and relates ETOH hx of drinking vodka/screwdriver 4-5 x/day.  On Losartan for HTN but has not seen PMD since pandemic.  No known hx of CKD.  Creat 2.76  and abnormal LFTs.  Urine positive for Cocaine and Benzo, though pt adamantly  denies  illicit drug use  CT non contrast with no intra abdominal pathology, positive for small bilateral pleural effusion with cardiomegaly.    PMHX and PSHX.  --HTN  --Thyroid goiter --post Thyroidectomy  --Post intranasal polyp resection.  --Hx of Asthma    MEDICATIONS  (STANDING):  allopurinol 200 milliGRAM(s) Oral daily  chlordiazePOXIDE 25 milliGRAM(s) Oral four times a day  chlorhexidine 0.12% Liquid 15 milliLiter(s) Oral Mucosa every 12 hours  chlorhexidine 4% Liquid 1 Application(s) Topical <User Schedule>  dexMEDEtomidine Infusion 0.6 MICROgram(s)/kG/Hr (16.3 mL/Hr) IV Continuous <Continuous>  folic acid 1 milliGRAM(s) Oral daily  heparin   Injectable 5000 Unit(s) SubCutaneous every 8 hours  lactulose Syrup 20 Gram(s) Oral every 6 hours  levothyroxine 100 MICROGram(s) Oral daily  midodrine 10 milliGRAM(s) Oral every 8 hours  norepinephrine Infusion 0.1 MICROgram(s)/kG/Min (20.4 mL/Hr) IV Continuous <Continuous>  pantoprazole  Injectable 40 milliGRAM(s) IV Push daily  piperacillin/tazobactam IVPB.. 3.375 Gram(s) IV Intermittent every 8 hours  rifAXIMin 550 milliGRAM(s) Oral two times a day    MEDICATIONS  (PRN):  acetaminophen    Suspension .. 650 milliGRAM(s) Oral every 6 hours PRN Temp greater or equal to 38C (100.4F)  albuterol    90 MICROgram(s) HFA Inhaler 2 Puff(s) Inhalation every 6 hours PRN Shortness of Breath  metoprolol tartrate Injectable 5 milliGRAM(s) IV Push every 6 hours PRN HR >130  ondansetron Injectable 4 milliGRAM(s) IV Push every 8 hours PRN Nausea and/or Vomiting  sodium chloride 0.9% lock flush 10 milliLiter(s) IV Push every 1 hour PRN Pre/post blood products, medications, blood draw, and to maintain line patency    Vital Signs Last 24 Hrs  T(C): 36.4 (2023 08:59), Max: 39.3 (2023 00:30)  T(F): 97.6 (2023 08:59), Max: 102.7 (2023 00:30)  HR: 116 (2023 10:00) (79 - 121)  BP: 94/67 (2023 10:15) (94/67 - 94/67)  BP(mean): 73 (2023 10:15) (73 - 73)  RR: 21 (2023 10:00) (14 - 36)  SpO2: 100% (2023 10:00) (97% - 100%)    Parameters below as of 2023 16:00  Patient On (Oxygen Delivery Method): BiPAP/CPAP    O2 Concentration (%): 40  Daily     Daily Weight in k.2 (2023 04:46)     @ 07:01  -   @ 07:00  --------------------------------------------------------  IN: 5926 mL / OUT: 2770 mL / NET: 3156 mL     @ 07:01  -   @ 10:13  --------------------------------------------------------  IN: 711.6 mL / OUT: 0 mL / NET: 711.6 mL    PHYSICAL EXAM:  GENERAL: on vent  CHEST/LUNG: fair air entry  HEART: S1S2 RRR  ABDOMEN: soft  EXTREMITIES: 2+ edema  SKIN:     LABS:                        14.0   9.38  )-----------( 118      ( 2023 06:08 )             43.9         147<H>  |  118<H>  |  51<H>  ----------------------------<  161<H>  3.7   |  22  |  1.81<H>    Ca    7.8<L>      2023 06:08  Phos  3.5       Mg     2.0         TPro  6.6  /  Alb  2.1<L>  /  TBili  0.6  /  DBili  x   /  AST  121<H>  /  ALT  166<H>  /  AlkPhos  89      PT/INR - ( 2023 06:08 )   PT: 16.1 sec;   INR: 1.38 ratio         PTT - ( 2023 06:08 )  PTT:41.1 sec    Magnesium, Serum: 2.0 mg/dL ( @ 06:08)  Phosphorus Level, Serum: 3.5 mg/dL ( @ 06:08)  Magnesium, Serum: 1.7 mg/dL ( @ 21:45)  Phosphorus Level, Serum: 3.0 mg/dL ( @ 21:45)          RADIOLOGY & ADDITIONAL TESTS:

## 2023-01-26 NOTE — PROGRESS NOTE ADULT - SUBJECTIVE AND OBJECTIVE BOX
Patient is a 67y old  Male who presents with a chief complaint of Altered mental status. (25 Jan 2023 17:41)      BRIEF HOSPITAL COURSE:   66 yo m pmhx polysubstance abuse, ETOH abuse, goiter s/p thyroidectomy, HTN, HLD admitted 1/18 with ETOH withdrawal with course complicated by severe LV dysfunction and AMARJIT further complicated by hypoxic respiratory failure in setting of ams and aspiration requiring intubation and shock state requiring vasopressors.      Events last 24 hours:   Patient febrile this evening despite tylenol, ordered for cooling blaket.  Had episode of svt into the 180s, ordered for lopressor 5mg ivp x1 with improvement in HR.  Repeat lytes ordered, given PO KCl and IV mag.  Remains on levophed for bp support.        PAST MEDICAL & SURGICAL HISTORY:  Intranasal mass      Hypertension, unspecified type      Hyperlipidemia, unspecified hyperlipidemia type      Thyroid nodule  left thyroidectomy      Asthma      H/O partial thyroidectomy  2008      S/P T&amp;A (status post tonsillectomy and adenoidectomy)  1965        Allergies  No Known Allergies      FAMILY HISTORY:      Social History:       Review of Systems:  unable to obtain 2/2 sedated/intubated      Physical Examination:    General: adult male, lying in bed, nad    HEENT: ett and ogt in place    PULM: coarse b/l    CVS: tachy/nsr    ABD: Soft, nondistended, nontender, +bs    EXT: + edema    SKIN: Warm     NEURO: sedated      Medications:  piperacillin/tazobactam IVPB.. 3.375 Gram(s) IV Intermittent every 8 hours  rifAXIMin 550 milliGRAM(s) Oral two times a day    metoprolol tartrate Injectable 5 milliGRAM(s) IV Push every 6 hours PRN  midodrine 10 milliGRAM(s) Oral every 8 hours  norepinephrine Infusion 0.1 MICROgram(s)/kG/Min IV Continuous <Continuous>    albuterol    90 MICROgram(s) HFA Inhaler 2 Puff(s) Inhalation every 6 hours PRN    acetaminophen    Suspension .. 650 milliGRAM(s) Oral every 6 hours PRN  chlordiazePOXIDE 25 milliGRAM(s) Oral four times a day  dexMEDEtomidine Infusion 0.6 MICROgram(s)/kG/Hr IV Continuous <Continuous>  ondansetron Injectable 4 milliGRAM(s) IV Push every 8 hours PRN      heparin   Injectable 5000 Unit(s) SubCutaneous every 8 hours    lactulose Syrup 20 Gram(s) Oral every 6 hours  pantoprazole  Injectable 40 milliGRAM(s) IV Push daily      allopurinol 200 milliGRAM(s) Oral daily  levothyroxine 100 MICROGram(s) Oral daily    folic acid 1 milliGRAM(s) Oral daily  sodium chloride 0.9% lock flush 10 milliLiter(s) IV Push every 1 hour PRN      chlorhexidine 0.12% Liquid 15 milliLiter(s) Oral Mucosa every 12 hours  chlorhexidine 4% Liquid 1 Application(s) Topical <User Schedule>        Mode: AC/ CMV (Assist Control/ Continuous Mandatory Ventilation)  RR (machine): 22  TV (machine): 500  FiO2: 40  PEEP: 5  PS: 10  ITime: 0.9  MAP: 9  PIP: 17      ICU Vital Signs Last 24 Hrs  T(C): 37.9 (25 Jan 2023 16:00), Max: 38.9 (25 Jan 2023 08:00)  T(F): 100.3 (25 Jan 2023 16:00), Max: 102.1 (25 Jan 2023 08:00)  HR: 81 (25 Jan 2023 23:00) (78 - 114)  BP: 94/67 (25 Jan 2023 10:15) (94/67 - 105/63)  BP(mean): 73 (25 Jan 2023 10:15) (73 - 73)  ABP: 101/62 (25 Jan 2023 23:00) (80/56 - 117/81)  ABP(mean): 76 (25 Jan 2023 23:00) (61 - 95)  RR: 31 (25 Jan 2023 23:00) (20 - 39)  SpO2: 100% (25 Jan 2023 23:00) (97% - 100%)    O2 Parameters below as of 25 Jan 2023 16:00  Patient On (Oxygen Delivery Method): BiPAP/CPAP    O2 Concentration (%): 40      Vital Signs Last 24 Hrs  T(C): 37.9 (25 Jan 2023 16:00), Max: 38.9 (25 Jan 2023 08:00)  T(F): 100.3 (25 Jan 2023 16:00), Max: 102.1 (25 Jan 2023 08:00)  HR: 81 (25 Jan 2023 23:00) (78 - 114)  BP: 94/67 (25 Jan 2023 10:15) (94/67 - 105/63)  BP(mean): 73 (25 Jan 2023 10:15) (73 - 73)  RR: 31 (25 Jan 2023 23:00) (20 - 39)  SpO2: 100% (25 Jan 2023 23:00) (97% - 100%)    Parameters below as of 25 Jan 2023 16:00  Patient On (Oxygen Delivery Method): BiPAP/CPAP    O2 Concentration (%): 40        I&O's Detail    24 Jan 2023 07:01  -  25 Jan 2023 07:00  --------------------------------------------------------  IN:    Dexmedetomidine: 560 mL    Enteral Tube Flush: 722 mL    IV PiggyBack: 100 mL    Nepro with Carb Steady: 1082 mL    Norepinephrine: 204 mL  Total IN: 2668 mL    OUT:    Indwelling Catheter - Urethral (mL): 1300 mL  Total OUT: 1300 mL    Total NET: 1368 mL      25 Jan 2023 07:01  -  26 Jan 2023 00:58  --------------------------------------------------------  IN:    Dexmedetomidine: 354.4 mL    Enteral Tube Flush: 1270 mL    Free Water: 320 mL    IV PiggyBack: 550 mL    Nepro with Carb Steady: 1190 mL    Norepinephrine: 173.7 mL    Sodium Chloride 0.9% Bolus: 1000 mL  Total IN: 4858.1 mL    OUT:    Indwelling Catheter - Urethral (mL): 910 mL    Rectal Tube (mL): 900 mL  Total OUT: 1810 mL    Total NET: 3048.1 mL            LABS:                        14.0   7.65  )-----------( 130      ( 25 Jan 2023 06:40 )             43.5     01-25    149<H>  |  117<H>  |  48<H>  ----------------------------<  153<H>  3.3<L>   |  25  |  1.87<H>    Ca    7.8<L>      25 Jan 2023 21:45  Phos  3.0     01-25  Mg     1.7     01-25    TPro  6.6  /  Alb  2.1<L>  /  TBili  0.6  /  DBili  x   /  AST  121<H>  /  ALT  166<H>  /  AlkPhos  89  01-25          CAPILLARY BLOOD GLUCOSE        PT/INR - ( 25 Jan 2023 06:40 )   PT: 16.7 sec;   INR: 1.43 ratio         PTT - ( 25 Jan 2023 06:40 )  PTT:37.3 sec    CULTURES:  Culture Results:   Normal Respiratory Citlalli present (01-21 @ 10:49)        RADIOLOGY: ***      SUPPLEMENTAL O2:   LINES:  IVF:  KAVITHA:   PPx:   CONTACT:

## 2023-01-27 NOTE — PROCEDURE NOTE - NSINDICATIONS_GEN_A_CORE
drainage/feeds
critical illness/emergency venous access
arterial puncture to obtain ABG's/cannulation purposes/critical patient/monitoring purposes
airway protection/mental status change/respiratory distress
critical patient

## 2023-01-27 NOTE — PROGRESS NOTE ADULT - PROBLEM SELECTOR PROBLEM 2
Cardiac arrhythmia
AMARJIT (acute kidney injury)
Sepsis with hypotension
AMARJIT (acute kidney injury)
AMARJIT (acute kidney injury)
Cardiac arrhythmia
Sepsis with hypotension
AMARJIT (acute kidney injury)
Sepsis with hypotension
AMARJIT (acute kidney injury)

## 2023-01-27 NOTE — PROGRESS NOTE ADULT - ASSESSMENT
67 yr old with PMHx of polysubstance abuse, etoh abuse, goiter s/p thyroidectomy, HTN, HLD admitted on 1/18 with hallucinations due to etoh withdrawal found to have severe LV dysfunction and AMARJIT of unclear duration, transferred to CCU on 1/20 with mixed respiratory failure due to aspiration pneumonitis and worsening mental status requiring intubation. Post intubation c/b shock requiring pressors.  LVEF 15-20%.  Brief runs of SVT and NSVT on telemetry.    A/P: Acute hypoxic resp failure, drug induced encephalopathy, acute on chronic HFrEF 15-20%, PSVT and NSVT, AFib  Acute decompensation last evening, severe shock, on multiple pressors now  Had DCCV for rapid AF  Currently SR 80-90 bpm, continue tele  Continue Amiodarone  LVEF 15-20%  Consider ischemic workup if pt condition improves  Keep lytes repleted K>4, Mg>2  EP will follow  Discussed with RN, Dr. Doe, CCU staff

## 2023-01-27 NOTE — PROVIDER CONTACT NOTE (EICU) - RECOMMENDATIONS
-agree with current management by CC GUANAKO Sanchez  -likely mixed shock state-distributive and cardiogenic   -broaden antibiotic coverage with vancomycin and meropenem   -downtrending platelet count likely due to sepsis, but would check HIT panel  -cardiogenic shock team to evaluate patient

## 2023-01-27 NOTE — PROGRESS NOTE ADULT - CARDIOVASCULAR
irregular rate and rhythm
irregular rate and rhythm/tachycardia
regular rate and rhythm

## 2023-01-27 NOTE — PROGRESS NOTE ADULT - SUBJECTIVE AND OBJECTIVE BOX
66 y/o male with pmhx of polysubstance abuse, etoh abuse, goiter s/p thyroidectomy, HTN, HLD admitted on 1/18 with hallucinations due to etoh withdrawal found to have severe LV dysfunction and AMARJIT of unclear duration transferred to CCU on 1/20 with mixed respiratory failure due to aspiration pneumonitis and worsening mental status requiring intubation. post intubation c/b shock requiring pressors.   Case discussed on CCU round and with the family.  1/24  Cased discussed on CCU rounds, SBT ongoing  1/25  Case discussed on CCU rounds.  Clinically improved. On SBT.  1/26 Case discussed on CCU rounds, extubated, family updated.  1/27 Case discussed on CCU rounds.  The patient decompensated to MOF, now on maximal support.      ICU Vital Signs Last 24 Hrs  T(C): 38.9 (27 Jan 2023 07:48), Max: 38.9 (27 Jan 2023 07:48)  T(F): 102 (27 Jan 2023 07:48), Max: 102 (27 Jan 2023 07:48)  HR: 98 (27 Jan 2023 12:00) (98 - 183)  ABP: 94/61 (27 Jan 2023 12:00) (72/52 - 107/72)  ABP(mean): 74 (27 Jan 2023 12:00) (54 - 111)  RR: 24 (27 Jan 2023 12:00) (0 - 48)  SpO2: 100% (27 Jan 2023 11:12) (83% - 100%)    O2 Parameters below as of 27 Jan 2023 07:30  Patient On (Oxygen Delivery Method): ventilator    O2 Concentration (%): 50    ABG - ( 27 Jan 2023 12:45 )  pH, Arterial: 7.13  pH, Blood: x     /  pCO2: 38    /  pO2: 191   / HCO3: 13    / Base Excess: -15.8 /  SaO2: 100               CARDIAC MARKERS ( 27 Jan 2023 04:10 )  x     / x     / 151 U/L / x     / x                                14.6   9.64  )-----------( 125      ( 27 Jan 2023 04:10 )             47.4         01-27    148<H>  |  115<H>  |  53<H>  ----------------------------<  84  5.0   |  21<L>  |  2.60<H>    Ca    7.9<L>      27 Jan 2023 04:10  Phos  6.2     01-27  Mg     2.0     01-27    TPro  6.8  /  Alb  2.1<L>  /  TBili  2.7<H>  /  DBili  x   /  AST  1318<H>  /  ALT  706<H>  /  AlkPhos  90  01-27    LIVER FUNCTIONS - ( 27 Jan 2023 04:10 )  Alb: 2.1 g/dL / Pro: 6.8 gm/dL / ALK PHOS: 90 U/L / ALT: 706 U/L / AST: 1318 U/L / GGT: x             PT/INR - ( 27 Jan 2023 04:45 )   PT: 20.9 sec;   INR: 1.79 ratio         PTT - ( 27 Jan 2023 04:45 )  PTT:38.6 sec

## 2023-01-27 NOTE — PROGRESS NOTE ADULT - RESPIRATORY
airway patent/good air movement/respirations non-labored
maximal vent support/airway patent/good air movement/respirations non-labored
airway patent/good air movement/respirations non-labored
secretions  NT suctioning/airway patent/good air movement/respirations labored
airway patent/good air movement/respirations non-labored

## 2023-01-27 NOTE — PROCEDURE NOTE - NSPROCNAME_GEN_A_CORE
Tracheal Intubation
Gastric Intubation/Gastric Lavage
Arterial Puncture/Cannulation
Central Line Insertion
Tracheal Intubation

## 2023-01-27 NOTE — PROCEDURE NOTE - NSTRACHPOSTINTU_RESP_A_CORE
Appropriate capnography/Breath sounds bilateral/Breath sounds equal/Chest excursion noted/Chest X-Ray/Positive end tidal Co2 noted
Appropriate capnography/Breath sounds bilateral/Breath sounds equal/Chest excursion noted/Chest X-Ray/Positive end tidal Co2 noted

## 2023-01-27 NOTE — CONSULT NOTE ADULT - REASON FOR ADMISSION
Altered mental status.

## 2023-01-27 NOTE — CHART NOTE - NSCHARTNOTEFT_GEN_A_CORE
Patient is a 67y old  Male who presents with a chief complaint of Altered mental status. (27 Jan 2023 18:41)      BRIEF HOSPITAL COURSE: ***    Events last 24 hours: ***    PAST MEDICAL & SURGICAL HISTORY:  Intranasal mass      Hypertension, unspecified type      Hyperlipidemia, unspecified hyperlipidemia type      Thyroid nodule  left thyroidectomy      Asthma      H/O partial thyroidectomy  2008      S/P T&amp;A (status post tonsillectomy and adenoidectomy)  1965          Review of Systems:  CONSTITUTIONAL: No fever, chills, or fatigue  EYES: No eye pain, visual disturbances, or discharge  ENMT:  No difficulty hearing, tinnitus, vertigo; No sinus or throat pain  NECK: No pain or stiffness  RESPIRATORY: No cough, wheezing, chills or hemoptysis; No shortness of breath  CARDIOVASCULAR: No chest pain, palpitations, dizziness, or leg swelling  GASTROINTESTINAL: No abdominal or epigastric pain. No nausea, vomiting, or hematemesis; No diarrhea or constipation. No melena or hematochezia.  GENITOURINARY: No dysuria, frequency, hematuria, or incontinence  NEUROLOGICAL: No headaches, memory loss, loss of strength, numbness, or tremors  SKIN: No itching, burning, rashes, or lesions   MUSCULOSKELETAL: No joint pain or swelling; No muscle, back, or extremity pain  PSYCHIATRIC: No depression, anxiety, mood swings, or difficulty sleeping      Medications:  meropenem  IVPB 1000 milliGRAM(s) IV Intermittent every 12 hours  rifAXIMin 550 milliGRAM(s) Oral two times a day    aMIOdarone Infusion 1 mG/Min IV Continuous <Continuous>  aMIOdarone IVPB 150 milliGRAM(s) IV Intermittent once  metoprolol tartrate Injectable 5 milliGRAM(s) IV Push every 6 hours PRN  midodrine 10 milliGRAM(s) Oral every 8 hours  norepinephrine Infusion 0.05 MICROgram(s)/kG/Min IV Continuous <Continuous>  phenylephrine    Infusion 0.5 MICROgram(s)/kG/Min IV Continuous <Continuous>    albuterol    90 MICROgram(s) HFA Inhaler 2 Puff(s) Inhalation every 6 hours PRN  sodium chloride 3%  Inhalation 4 milliLiter(s) Inhalation every 6 hours    acetaminophen    Suspension .. 650 milliGRAM(s) Oral every 6 hours PRN  fentaNYL   Infusion. 0.5 MICROgram(s)/kG/Hr IV Continuous <Continuous>  ondansetron Injectable 4 milliGRAM(s) IV Push every 8 hours PRN      heparin   Injectable 5000 Unit(s) SubCutaneous every 8 hours    lactulose Syrup 20 Gram(s) Oral every 6 hours  pantoprazole  Injectable 40 milliGRAM(s) IV Push daily      allopurinol 200 milliGRAM(s) Oral daily  hydrocortisone sodium succinate Injectable 50 milliGRAM(s) IV Push every 6 hours  levothyroxine Injectable 50 MICROGram(s) IV Push at bedtime  vasopressin Infusion 0.04 Unit(s)/Min IV Continuous <Continuous>    folic acid 1 milliGRAM(s) Oral daily  sodium bicarbonate  Infusion 0.207 mEq/kG/Hr IV Continuous <Continuous>  sodium chloride 0.9% lock flush 10 milliLiter(s) IV Push every 1 hour PRN      chlorhexidine 0.12% Liquid 15 milliLiter(s) Oral Mucosa every 12 hours  chlorhexidine 4% Liquid 1 Application(s) Topical <User Schedule>        Mode: AC/ CMV (Assist Control/ Continuous Mandatory Ventilation)  RR (machine): 30  TV (machine): 500  FiO2: 50  PEEP: 5  ITime: 0.7  MAP: 11  PIP: 23      ICU Vital Signs Last 24 Hrs  T(C): 33.6 (27 Jan 2023 20:30), Max: 38.9 (27 Jan 2023 07:48)  T(F): 92.5 (27 Jan 2023 20:30), Max: 102 (27 Jan 2023 07:48)  HR: 89 (27 Jan 2023 20:30) (83 - 157)  BP: --  BP(mean): --  ABP: 103/68 (27 Jan 2023 20:30) (72/52 - 106/66)  ABP(mean): 82 (27 Jan 2023 20:30) (54 - 87)  RR: 30 (27 Jan 2023 20:30) (0 - 48)  SpO2: 100% (27 Jan 2023 20:30) (50% - 100%)    O2 Parameters below as of 27 Jan 2023 07:30  Patient On (Oxygen Delivery Method): ventilator    O2 Concentration (%): 50        ABG - ( 27 Jan 2023 15:22 )  pH, Arterial: 7.14  pH, Blood: x     /  pCO2: 33    /  pO2: 167   / HCO3: 11    / Base Excess: -16.7 /  SaO2: 100                 I&O's Detail    26 Jan 2023 07:01  -  27 Jan 2023 07:00  --------------------------------------------------------  IN:    Amiodarone: 432.9 mL    Dexmedetomidine: 191.1 mL    Enteral Tube Flush: 525 mL    FentaNYL: 54.5 mL    IV PiggyBack: 200 mL    IV PiggyBack: 100 mL    Lactated Ringers: 375 mL    Nepro with Carb Steady: 490 mL    Norepinephrine: 820 mL    Norepinephrine: 563.2 mL    Phenylephrine: 137.4 mL    Phenylephrine: 81 mL    Sodium Bicarbonate: 300 mL    Vasopressin: 24 mL  Total IN: 4294.1 mL    OUT:    Indwelling Catheter - Urethral (mL): 645 mL    Rectal Tube (mL): 600 mL  Total OUT: 1245 mL    Total NET: 3049.1 mL      27 Jan 2023 07:01  -  27 Jan 2023 22:22  --------------------------------------------------------  IN:  Total IN: 0 mL    OUT:    Indwelling Catheter - Urethral (mL): 20 mL  Total OUT: 20 mL    Total NET: -20 mL            LABS:                        14.6   9.64  )-----------( 125      ( 27 Jan 2023 04:10 )             47.4     01-27    143  |  108  |  63<H>  ----------------------------<  267<H>  4.0   |  14<L>  |  3.63<H>    Ca    7.1<L>      27 Jan 2023 19:40  Phos  7.7     01-27  Mg     2.2     01-27    TPro  6.4  /  Alb  1.9<L>  /  TBili  2.4<H>  /  DBili  x   /  AST  91836  /  ALT  5390<H>  /  AlkPhos  93  01-27      CARDIAC MARKERS ( 27 Jan 2023 04:10 )  x     / x     / 151 U/L / x     / x          CAPILLARY BLOOD GLUCOSE        PT/INR - ( 27 Jan 2023 04:45 )   PT: 20.9 sec;   INR: 1.79 ratio         PTT - ( 27 Jan 2023 04:45 )  PTT:38.6 sec    CULTURES:  Culture Results:   Normal Respiratory Citlalli present (01-21-23 @ 10:49)      Physical Examination:    General: No acute distress.  Alert, oriented, interactive, nonfocal, following simple commands and moving all extremities     HEENT: Pupils equal, reactive to light.  Symmetric.    PULM: Breathing comfortabley, good BS B/L with no Rales or Rhonchi, no significant sputum production    CVS: Regular rate and rhythm, no murmurs, rubs, or gallops    ABD: BS+ Soft, nondistended, nontender, no masses    EXT: No edema, nontender    SKIN: Warm and well perfused, no rashes noted.    RADIOLOGY: ***    CRITICAL CARE TIME SPENT: *** mins of time have been spent evaluating, reviewing all available lab and radiologic material, reviewing the EMR and treating this critically ill patient, who has complex medical problems and life threatening organ dysfunction. This also included speaking with the staff, consultants, and family. Patient is a 67y old  Male who presents with a chief complaint of Altered mental status. (27 Jan 2023 18:41)      BRIEF HOSPITAL COURSE: 67M with HTN, Thyroid Goiter s/p thyroidectomy, HLD, polysubstance abuse, etoh abuse admitted on 1/18 with hallucinations due to ETOH withdrawal found to have severe LV dysfunction and AMARJIT of unclear duration transferred to CCU on 1/20 with mixed respiratory failure due to aspiration pneumonitis and worsening mental status requiring intubation. Post intubation ccb shock requiring pressors now with worsening MSOF.    Events last 24 hours: Evening labs consistent with worsening MSOF: Cr 2.60 -> 3.63, serum bicarb 21->14,  -> 5390, Lactate 8.3 -> 16.5. Remains on levophed, vaso, and ramo with escalating pressor requirements. Bedside POCUS was severely dilated bilateral atria and ventricles with minimal systolic squeeze, EF estimated to be grossly <15%, plethoric IVC >2cm without respiratory variation, no pericardial effusion visualized. Personally called family     PAST MEDICAL & SURGICAL HISTORY:  Intranasal mass      Hypertension, unspecified type      Hyperlipidemia, unspecified hyperlipidemia type      Thyroid nodule  left thyroidectomy      Asthma      H/O partial thyroidectomy  2008      S/P T&amp;A (status post tonsillectomy and adenoidectomy)  1965          Review of Systems: Intubated, unable to participate      Medications:  meropenem  IVPB 1000 milliGRAM(s) IV Intermittent every 12 hours  rifAXIMin 550 milliGRAM(s) Oral two times a day    aMIOdarone Infusion 1 mG/Min IV Continuous <Continuous>  aMIOdarone IVPB 150 milliGRAM(s) IV Intermittent once  metoprolol tartrate Injectable 5 milliGRAM(s) IV Push every 6 hours PRN  midodrine 10 milliGRAM(s) Oral every 8 hours  norepinephrine Infusion 0.05 MICROgram(s)/kG/Min IV Continuous <Continuous>  phenylephrine    Infusion 0.5 MICROgram(s)/kG/Min IV Continuous <Continuous>    albuterol    90 MICROgram(s) HFA Inhaler 2 Puff(s) Inhalation every 6 hours PRN  sodium chloride 3%  Inhalation 4 milliLiter(s) Inhalation every 6 hours    acetaminophen    Suspension .. 650 milliGRAM(s) Oral every 6 hours PRN  fentaNYL   Infusion. 0.5 MICROgram(s)/kG/Hr IV Continuous <Continuous>  ondansetron Injectable 4 milliGRAM(s) IV Push every 8 hours PRN      heparin   Injectable 5000 Unit(s) SubCutaneous every 8 hours    lactulose Syrup 20 Gram(s) Oral every 6 hours  pantoprazole  Injectable 40 milliGRAM(s) IV Push daily      allopurinol 200 milliGRAM(s) Oral daily  hydrocortisone sodium succinate Injectable 50 milliGRAM(s) IV Push every 6 hours  levothyroxine Injectable 50 MICROGram(s) IV Push at bedtime  vasopressin Infusion 0.04 Unit(s)/Min IV Continuous <Continuous>    folic acid 1 milliGRAM(s) Oral daily  sodium bicarbonate  Infusion 0.207 mEq/kG/Hr IV Continuous <Continuous>  sodium chloride 0.9% lock flush 10 milliLiter(s) IV Push every 1 hour PRN      chlorhexidine 0.12% Liquid 15 milliLiter(s) Oral Mucosa every 12 hours  chlorhexidine 4% Liquid 1 Application(s) Topical <User Schedule>        Mode: AC/ CMV (Assist Control/ Continuous Mandatory Ventilation)  RR (machine): 30  TV (machine): 500  FiO2: 50  PEEP: 5  ITime: 0.7  MAP: 11  PIP: 23      ICU Vital Signs Last 24 Hrs  T(C): 33.6 (27 Jan 2023 20:30), Max: 38.9 (27 Jan 2023 07:48)  T(F): 92.5 (27 Jan 2023 20:30), Max: 102 (27 Jan 2023 07:48)  HR: 89 (27 Jan 2023 20:30) (83 - 157)  BP: --  BP(mean): --  ABP: 103/68 (27 Jan 2023 20:30) (72/52 - 106/66)  ABP(mean): 82 (27 Jan 2023 20:30) (54 - 87)  RR: 30 (27 Jan 2023 20:30) (0 - 48)  SpO2: 100% (27 Jan 2023 20:30) (50% - 100%)    O2 Parameters below as of 27 Jan 2023 07:30  Patient On (Oxygen Delivery Method): ventilator    O2 Concentration (%): 50        ABG - ( 27 Jan 2023 15:22 )  pH, Arterial: 7.14  pH, Blood: x     /  pCO2: 33    /  pO2: 167   / HCO3: 11    / Base Excess: -16.7 /  SaO2: 100                 I&O's Detail    26 Jan 2023 07:01  -  27 Jan 2023 07:00  --------------------------------------------------------  IN:    Amiodarone: 432.9 mL    Dexmedetomidine: 191.1 mL    Enteral Tube Flush: 525 mL    FentaNYL: 54.5 mL    IV PiggyBack: 200 mL    IV PiggyBack: 100 mL    Lactated Ringers: 375 mL    Nepro with Carb Steady: 490 mL    Norepinephrine: 820 mL    Norepinephrine: 563.2 mL    Phenylephrine: 137.4 mL    Phenylephrine: 81 mL    Sodium Bicarbonate: 300 mL    Vasopressin: 24 mL  Total IN: 4294.1 mL    OUT:    Indwelling Catheter - Urethral (mL): 645 mL    Rectal Tube (mL): 600 mL  Total OUT: 1245 mL    Total NET: 3049.1 mL      27 Jan 2023 07:01  -  27 Jan 2023 22:22  --------------------------------------------------------  IN:  Total IN: 0 mL    OUT:    Indwelling Catheter - Urethral (mL): 20 mL  Total OUT: 20 mL    Total NET: -20 mL            LABS:                        14.6   9.64  )-----------( 125      ( 27 Jan 2023 04:10 )             47.4     01-27    143  |  108  |  63<H>  ----------------------------<  267<H>  4.0   |  14<L>  |  3.63<H>    Ca    7.1<L>      27 Jan 2023 19:40  Phos  7.7     01-27  Mg     2.2     01-27    TPro  6.4  /  Alb  1.9<L>  /  TBili  2.4<H>  /  DBili  x   /  AST  48906  /  ALT  5390<H>  /  AlkPhos  93  01-27      CARDIAC MARKERS ( 27 Jan 2023 04:10 )  x     / x     / 151 U/L / x     / x          CAPILLARY BLOOD GLUCOSE        PT/INR - ( 27 Jan 2023 04:45 )   PT: 20.9 sec;   INR: 1.79 ratio         PTT - ( 27 Jan 2023 04:45 )  PTT:38.6 sec    CULTURES:  Culture Results:   Normal Respiratory Citlalli present (01-21-23 @ 10:49)      Physical Examination:    General: No acute distress.  Alert, oriented, interactive, nonfocal, following simple commands and moving all extremities     HEENT: Pupils equal, reactive to light.  Symmetric.    PULM: Breathing comfortabley, good BS B/L with no Rales or Rhonchi, no significant sputum production    CVS: Regular rate and rhythm, no murmurs, rubs, or gallops    ABD: BS+ Soft, nondistended, nontender, no masses    EXT: No edema, nontender    SKIN: Warm and well perfused, no rashes noted.    RADIOLOGY: ***    CRITICAL CARE TIME SPENT: *** mins of time have been spent evaluating, reviewing all available lab and radiologic material, reviewing the EMR and treating this critically ill patient, who has complex medical problems and life threatening organ dysfunction. This also included speaking with the staff, consultants, and family. Patient is a 67y old  Male who presents with a chief complaint of Altered mental status. (27 Jan 2023 18:41)      BRIEF HOSPITAL COURSE: 67M with HTN, Thyroid Goiter s/p thyroidectomy, HLD, polysubstance abuse, etoh abuse admitted on 1/18 with hallucinations due to ETOH withdrawal found to have severe LV dysfunction and AMARJIT of unclear duration transferred to CCU on 1/20 with mixed respiratory failure due to aspiration pneumonitis and worsening mental status requiring intubation. Post intubation ccb shock requiring pressors now with worsening MSOF.    Events last 24 hours: Evening labs consistent with worsening MSOF: Cr 2.60 -> 3.63, serum bicarb 21->14,  -> 5390, Lactate 8.3 -> 16.5. Remains on levophed, vaso, and ramo with escalating pressor requirements. Bedside POCUS was severely dilated bilateral atria and ventricles with minimal systolic squeeze, EF estimated to be grossly <15%, plethoric IVC >2cm without respiratory variation, no pericardial effusion visualized. Personally called sister / HCP Renetta to discuss grim prognosis and GOC. Explained patient is in triple pressor shock with escalating pressor requirements and worsening MSOF, offered dobutamine for inotropic support however Renetta opted for DNR and comfort care measures. Family to report to bedside for palliative extubation.      PAST MEDICAL & SURGICAL HISTORY:  Intranasal mass  Hypertension, unspecified type  Hyperlipidemia, unspecified hyperlipidemia type  Thyroid nodule  left thyroidectomy  Asthma  H/O partial thyroidectomy  2008  S/P T&amp;A (status post tonsillectomy and adenoidectomy)  1965      Review of Systems: Intubated, unable to participate      Medications:  meropenem  IVPB 1000 milliGRAM(s) IV Intermittent every 12 hours  rifAXIMin 550 milliGRAM(s) Oral two times a day  aMIOdarone Infusion 1 mG/Min IV Continuous <Continuous>  aMIOdarone IVPB 150 milliGRAM(s) IV Intermittent once  metoprolol tartrate Injectable 5 milliGRAM(s) IV Push every 6 hours PRN  midodrine 10 milliGRAM(s) Oral every 8 hours  norepinephrine Infusion 0.05 MICROgram(s)/kG/Min IV Continuous <Continuous>  phenylephrine    Infusion 0.5 MICROgram(s)/kG/Min IV Continuous <Continuous>  albuterol    90 MICROgram(s) HFA Inhaler 2 Puff(s) Inhalation every 6 hours PRN  sodium chloride 3%  Inhalation 4 milliLiter(s) Inhalation every 6 hours  acetaminophen    Suspension .. 650 milliGRAM(s) Oral every 6 hours PRN  fentaNYL   Infusion. 0.5 MICROgram(s)/kG/Hr IV Continuous <Continuous>  ondansetron Injectable 4 milliGRAM(s) IV Push every 8 hours PRN  heparin   Injectable 5000 Unit(s) SubCutaneous every 8 hours  lactulose Syrup 20 Gram(s) Oral every 6 hours  pantoprazole  Injectable 40 milliGRAM(s) IV Push daily  allopurinol 200 milliGRAM(s) Oral daily  hydrocortisone sodium succinate Injectable 50 milliGRAM(s) IV Push every 6 hours  levothyroxine Injectable 50 MICROGram(s) IV Push at bedtime  vasopressin Infusion 0.04 Unit(s)/Min IV Continuous <Continuous>  folic acid 1 milliGRAM(s) Oral daily  sodium bicarbonate  Infusion 0.207 mEq/kG/Hr IV Continuous <Continuous>  sodium chloride 0.9% lock flush 10 milliLiter(s) IV Push every 1 hour PRN  chlorhexidine 0.12% Liquid 15 milliLiter(s) Oral Mucosa every 12 hours  chlorhexidine 4% Liquid 1 Application(s) Topical <User Schedule>      Mode: AC/ CMV (Assist Control/ Continuous Mandatory Ventilation)  RR (machine): 30  TV (machine): 500  FiO2: 50  PEEP: 5  ITime: 0.7  MAP: 11  PIP: 23      ICU Vital Signs Last 24 Hrs  T(C): 33.6 (27 Jan 2023 20:30), Max: 38.9 (27 Jan 2023 07:48)  T(F): 92.5 (27 Jan 2023 20:30), Max: 102 (27 Jan 2023 07:48)  HR: 89 (27 Jan 2023 20:30) (83 - 157)  BP: --  BP(mean): --  ABP: 103/68 (27 Jan 2023 20:30) (72/52 - 106/66)  ABP(mean): 82 (27 Jan 2023 20:30) (54 - 87)  RR: 30 (27 Jan 2023 20:30) (0 - 48)  SpO2: 100% (27 Jan 2023 20:30) (50% - 100%)  O2 Parameters below as of 27 Jan 2023 07:30  Patient On (Oxygen Delivery Method): ventilator  O2 Concentration (%): 50    ABG - ( 27 Jan 2023 15:22 )  pH, Arterial: 7.14  pH, Blood: x     /  pCO2: 33    /  pO2: 167   / HCO3: 11    / Base Excess: -16.7 /  SaO2: 100           I&O's Detail    26 Jan 2023 07:01  -  27 Jan 2023 07:00  --------------------------------------------------------  IN:    Amiodarone: 432.9 mL    Dexmedetomidine: 191.1 mL    Enteral Tube Flush: 525 mL    FentaNYL: 54.5 mL    IV PiggyBack: 200 mL    IV PiggyBack: 100 mL    Lactated Ringers: 375 mL    Nepro with Carb Steady: 490 mL    Norepinephrine: 820 mL    Norepinephrine: 563.2 mL    Phenylephrine: 137.4 mL    Phenylephrine: 81 mL    Sodium Bicarbonate: 300 mL    Vasopressin: 24 mL  Total IN: 4294.1 mL    OUT:    Indwelling Catheter - Urethral (mL): 645 mL    Rectal Tube (mL): 600 mL  Total OUT: 1245 mL    Total NET: 3049.1 mL      27 Jan 2023 07:01  -  27 Jan 2023 22:22  --------------------------------------------------------  IN:  Total IN: 0 mL    OUT:    Indwelling Catheter - Urethral (mL): 20 mL  Total OUT: 20 mL    Total NET: -20 mL            LABS:                        14.6   9.64  )-----------( 125      ( 27 Jan 2023 04:10 )             47.4     01-27    143  |  108  |  63<H>  ----------------------------<  267<H>  4.0   |  14<L>  |  3.63<H>    Ca    7.1<L>      27 Jan 2023 19:40  Phos  7.7     01-27  Mg     2.2     01-27    TPro  6.4  /  Alb  1.9<L>  /  TBili  2.4<H>  /  DBili  x   /  AST  31220  /  ALT  5390<H>  /  AlkPhos  93  01-27      CARDIAC MARKERS ( 27 Jan 2023 04:10 )  x     / x     / 151 U/L / x     / x          CAPILLARY BLOOD GLUCOSE        PT/INR - ( 27 Jan 2023 04:45 )   PT: 20.9 sec;   INR: 1.79 ratio         PTT - ( 27 Jan 2023 04:45 )  PTT:38.6 sec    CULTURES:  Culture Results:   Normal Respiratory Citlalli present (01-21-23 @ 10:49)      Physical Examination:    General: No acute distress. Intubated     HEENT: Pupils equal, reactive to light.  Symmetric.    PULM: Breathing comfortably on vent, good BS B/L with no Rales or Rhonchi, no significant sputum production    CVS: Irregular rate and rhythm, no murmurs, rubs, or gallops    ABD: BS+ Soft, nondistended, nontender, no masses    EXT / SKIN: BLE edema with diffuse mottling and prolonged capillary refill. Warm extremities iso aliya hugger.      ADDITIONAL CRITICAL CARE TIME SPENT: 60 mins of time have been spent evaluating, reviewing all available lab and radiologic material, reviewing the EMR and treating this critically ill patient, who has complex medical problems and life threatening organ dysfunction. This also included speaking with the staff, consultants, and family.      ASSESSMENT:      Severe Mixed Septic and Cardiogenic Shock with MSOF  Acute Mixed Respiratory Failure iso Aspiration PNA  ETOH withdrawal syndrome  ETOH Hepatitis  AMARJIT  Hyperammonemia  TODD Patient is a 67y old  Male who presents with a chief complaint of Altered mental status. (27 Jan 2023 18:41)      BRIEF HOSPITAL COURSE: 67M with HTN, Thyroid Goiter s/p thyroidectomy, HLD, polysubstance abuse, etoh abuse admitted on 1/18 with hallucinations due to ETOH withdrawal found to have severe LV dysfunction and AMARJIT of unclear duration transferred to CCU on 1/20 with mixed respiratory failure due to aspiration pneumonitis and worsening mental status requiring intubation. Post intubation ccb shock requiring pressors now with worsening MSOF.    Events last 24 hours: Evening labs consistent with worsening MSOF: Cr 2.60 -> 3.63, serum bicarb 21->14,  -> 5390, Lactate 8.3 -> 16.5. Remains on levophed, vaso, and ramo with escalating pressor requirements. Bedside POCUS was severely dilated bilateral atria and ventricles with minimal systolic squeeze, EF estimated to be grossly <15%, plethoric IVC >2cm without respiratory variation, no pericardial effusion visualized. Personally called sister / HCP Renetta to discuss grim prognosis and GOC. Explained patient is in triple pressor shock with escalating pressor requirements and worsening MSOF, offered dobutamine for inotropic support however Renetta opted for DNR and comfort care measures. Family to report to bedside for palliative extubation.      PAST MEDICAL & SURGICAL HISTORY:  Intranasal mass  Hypertension, unspecified type  Hyperlipidemia, unspecified hyperlipidemia type  Thyroid nodule  left thyroidectomy  Asthma  H/O partial thyroidectomy  2008  S/P T&amp;A (status post tonsillectomy and adenoidectomy)  1965      Review of Systems: Intubated, unable to participate      Medications:  meropenem  IVPB 1000 milliGRAM(s) IV Intermittent every 12 hours  rifAXIMin 550 milliGRAM(s) Oral two times a day  aMIOdarone Infusion 1 mG/Min IV Continuous <Continuous>  aMIOdarone IVPB 150 milliGRAM(s) IV Intermittent once  metoprolol tartrate Injectable 5 milliGRAM(s) IV Push every 6 hours PRN  midodrine 10 milliGRAM(s) Oral every 8 hours  norepinephrine Infusion 0.05 MICROgram(s)/kG/Min IV Continuous <Continuous>  phenylephrine    Infusion 0.5 MICROgram(s)/kG/Min IV Continuous <Continuous>  albuterol    90 MICROgram(s) HFA Inhaler 2 Puff(s) Inhalation every 6 hours PRN  sodium chloride 3%  Inhalation 4 milliLiter(s) Inhalation every 6 hours  acetaminophen    Suspension .. 650 milliGRAM(s) Oral every 6 hours PRN  fentaNYL   Infusion. 0.5 MICROgram(s)/kG/Hr IV Continuous <Continuous>  ondansetron Injectable 4 milliGRAM(s) IV Push every 8 hours PRN  heparin   Injectable 5000 Unit(s) SubCutaneous every 8 hours  lactulose Syrup 20 Gram(s) Oral every 6 hours  pantoprazole  Injectable 40 milliGRAM(s) IV Push daily  allopurinol 200 milliGRAM(s) Oral daily  hydrocortisone sodium succinate Injectable 50 milliGRAM(s) IV Push every 6 hours  levothyroxine Injectable 50 MICROGram(s) IV Push at bedtime  vasopressin Infusion 0.04 Unit(s)/Min IV Continuous <Continuous>  folic acid 1 milliGRAM(s) Oral daily  sodium bicarbonate  Infusion 0.207 mEq/kG/Hr IV Continuous <Continuous>  sodium chloride 0.9% lock flush 10 milliLiter(s) IV Push every 1 hour PRN  chlorhexidine 0.12% Liquid 15 milliLiter(s) Oral Mucosa every 12 hours  chlorhexidine 4% Liquid 1 Application(s) Topical <User Schedule>      Mode: AC/ CMV (Assist Control/ Continuous Mandatory Ventilation)  RR (machine): 30  TV (machine): 500  FiO2: 50  PEEP: 5  ITime: 0.7  MAP: 11  PIP: 23      ICU Vital Signs Last 24 Hrs  T(C): 33.6 (27 Jan 2023 20:30), Max: 38.9 (27 Jan 2023 07:48)  T(F): 92.5 (27 Jan 2023 20:30), Max: 102 (27 Jan 2023 07:48)  HR: 89 (27 Jan 2023 20:30) (83 - 157)  BP: --  BP(mean): --  ABP: 103/68 (27 Jan 2023 20:30) (72/52 - 106/66)  ABP(mean): 82 (27 Jan 2023 20:30) (54 - 87)  RR: 30 (27 Jan 2023 20:30) (0 - 48)  SpO2: 100% (27 Jan 2023 20:30) (50% - 100%)  O2 Parameters below as of 27 Jan 2023 07:30  Patient On (Oxygen Delivery Method): ventilator  O2 Concentration (%): 50    ABG - ( 27 Jan 2023 15:22 )  pH, Arterial: 7.14  pH, Blood: x     /  pCO2: 33    /  pO2: 167   / HCO3: 11    / Base Excess: -16.7 /  SaO2: 100           I&O's Detail    26 Jan 2023 07:01  -  27 Jan 2023 07:00  --------------------------------------------------------  IN:    Amiodarone: 432.9 mL    Dexmedetomidine: 191.1 mL    Enteral Tube Flush: 525 mL    FentaNYL: 54.5 mL    IV PiggyBack: 200 mL    IV PiggyBack: 100 mL    Lactated Ringers: 375 mL    Nepro with Carb Steady: 490 mL    Norepinephrine: 820 mL    Norepinephrine: 563.2 mL    Phenylephrine: 137.4 mL    Phenylephrine: 81 mL    Sodium Bicarbonate: 300 mL    Vasopressin: 24 mL  Total IN: 4294.1 mL    OUT:    Indwelling Catheter - Urethral (mL): 645 mL    Rectal Tube (mL): 600 mL  Total OUT: 1245 mL    Total NET: 3049.1 mL      27 Jan 2023 07:01  -  27 Jan 2023 22:22  --------------------------------------------------------  IN:  Total IN: 0 mL    OUT:    Indwelling Catheter - Urethral (mL): 20 mL  Total OUT: 20 mL    Total NET: -20 mL            LABS:                        14.6   9.64  )-----------( 125      ( 27 Jan 2023 04:10 )             47.4     01-27    143  |  108  |  63<H>  ----------------------------<  267<H>  4.0   |  14<L>  |  3.63<H>    Ca    7.1<L>      27 Jan 2023 19:40  Phos  7.7     01-27  Mg     2.2     01-27    TPro  6.4  /  Alb  1.9<L>  /  TBili  2.4<H>  /  DBili  x   /  AST  29543  /  ALT  5390<H>  /  AlkPhos  93  01-27      CARDIAC MARKERS ( 27 Jan 2023 04:10 )  x     / x     / 151 U/L / x     / x          CAPILLARY BLOOD GLUCOSE        PT/INR - ( 27 Jan 2023 04:45 )   PT: 20.9 sec;   INR: 1.79 ratio         PTT - ( 27 Jan 2023 04:45 )  PTT:38.6 sec    CULTURES:  Culture Results:   Normal Respiratory Citlalli present (01-21-23 @ 10:49)      Physical Examination:    General: No acute distress. Intubated     HEENT: Pupils equal, reactive to light.  Symmetric.    PULM: Breathing comfortably on vent, good BS B/L with no Rales or Rhonchi, no significant sputum production    CVS: Irregular rate and rhythm, no murmurs, rubs, or gallops    ABD: BS+ Soft, nondistended, nontender, no masses    EXT / SKIN: BLE edema with diffuse mottling and prolonged capillary refill. Warm extremities iso aliya hugger.      ADDITIONAL CRITICAL CARE TIME SPENT: 60 mins of time have been spent evaluating, reviewing all available lab and radiologic material, reviewing the EMR and treating this critically ill patient, who has complex medical problems and life threatening organ dysfunction. This also included speaking with the staff, consultants, and family.      ASSESSMENT:  OVERNIGHT EVENTS: Evening labs consistent with worsening MSOF: Cr 2.60 -> 3.63, serum bicarb 21->14,  -> 5390, Lactate 8.3 -> 16.5. Remains on levophed, vaso, and ramo with escalating pressor requirements. Bedside POCUS was severely dilated bilateral atria and ventricles with minimal systolic squeeze, EF estimated to be grossly <15%, plethoric IVC >2cm without respiratory variation, no pericardial effusion visualized. Personally called sister / HCP Renetta to discuss grim prognosis and GOC. Explained patient is in triple pressor shock with escalating pressor requirements and worsening MSOF, offered dobutamine for inotropic support however Renetta opted for DNR and comfort care measures. Family to report to bedside for palliative extubation.      Severe Mixed Septic and Cardiogenic Shock with MSOF  Acute Mixed Respiratory Failure iso Aspiration PNA  Severe Acidosis  ETOH withdrawal syndrome  ETOH Hepatitis  AMARJIT  Hyperammonemia  TODD    Plan:  - Actively titrating pressors to maintain goal MAP >65 until family arrives.  - Upon family arrival will administer glycopyrrolate for reduction of secretions, ativan 2mg IV, dilaudid 2mg IV and initiate dilaudid gtt to promote patient comfort during compassionate extubation.  - Will D/C vasopressor support, amiodarone, sodium bicarbonate, heparin, and all other medications at time of extubation to move to comfort care measures.  - MOLST in chart.     CODE STATUS: DNR/Comfort Care Patient is a 67y old  Male who presents with a chief complaint of Altered mental status. (27 Jan 2023 18:41)      BRIEF HOSPITAL COURSE: 67M with HTN, Thyroid Goiter s/p thyroidectomy, HLD, polysubstance abuse, etoh abuse admitted on 1/18 with hallucinations due to ETOH withdrawal found to have severe LV dysfunction and AMARJIT of unclear duration transferred to CCU on 1/20 with mixed respiratory failure due to aspiration pneumonitis and worsening mental status requiring intubation. Post intubation ccb shock requiring pressors now with worsening MSOF.    Events last 24 hours: Evening labs consistent with worsening MSOF: Cr 2.60 -> 3.63, serum bicarb 21->14,  -> 5390, Lactate 8.3 -> 16.5. Remains on levophed, vaso, and ramo with escalating pressor requirements. Bedside POCUS was severely dilated bilateral atria and ventricles with minimal systolic squeeze, EF estimated to be grossly <15%, plethoric IVC >2cm without respiratory variation, no pericardial effusion visualized. Personally called sister / HCP Renetta to discuss grim prognosis and GOC. Explained patient is in triple pressor shock with escalating pressor requirements and worsening MSOF, offered dobutamine for inotropic support however Renetta opted for DNR and comfort care measures. Family to report to bedside for palliative extubation.      PAST MEDICAL & SURGICAL HISTORY:  Intranasal mass  Hypertension, unspecified type  Hyperlipidemia, unspecified hyperlipidemia type  Thyroid nodule  left thyroidectomy  Asthma  H/O partial thyroidectomy  2008  S/P T&amp;A (status post tonsillectomy and adenoidectomy)  1965      Review of Systems: Intubated, unable to participate      Medications:  meropenem  IVPB 1000 milliGRAM(s) IV Intermittent every 12 hours  rifAXIMin 550 milliGRAM(s) Oral two times a day  aMIOdarone Infusion 1 mG/Min IV Continuous <Continuous>  aMIOdarone IVPB 150 milliGRAM(s) IV Intermittent once  metoprolol tartrate Injectable 5 milliGRAM(s) IV Push every 6 hours PRN  midodrine 10 milliGRAM(s) Oral every 8 hours  norepinephrine Infusion 0.05 MICROgram(s)/kG/Min IV Continuous <Continuous>  phenylephrine    Infusion 0.5 MICROgram(s)/kG/Min IV Continuous <Continuous>  albuterol    90 MICROgram(s) HFA Inhaler 2 Puff(s) Inhalation every 6 hours PRN  sodium chloride 3%  Inhalation 4 milliLiter(s) Inhalation every 6 hours  acetaminophen    Suspension .. 650 milliGRAM(s) Oral every 6 hours PRN  fentaNYL   Infusion. 0.5 MICROgram(s)/kG/Hr IV Continuous <Continuous>  ondansetron Injectable 4 milliGRAM(s) IV Push every 8 hours PRN  heparin   Injectable 5000 Unit(s) SubCutaneous every 8 hours  lactulose Syrup 20 Gram(s) Oral every 6 hours  pantoprazole  Injectable 40 milliGRAM(s) IV Push daily  allopurinol 200 milliGRAM(s) Oral daily  hydrocortisone sodium succinate Injectable 50 milliGRAM(s) IV Push every 6 hours  levothyroxine Injectable 50 MICROGram(s) IV Push at bedtime  vasopressin Infusion 0.04 Unit(s)/Min IV Continuous <Continuous>  folic acid 1 milliGRAM(s) Oral daily  sodium bicarbonate  Infusion 0.207 mEq/kG/Hr IV Continuous <Continuous>  sodium chloride 0.9% lock flush 10 milliLiter(s) IV Push every 1 hour PRN  chlorhexidine 0.12% Liquid 15 milliLiter(s) Oral Mucosa every 12 hours  chlorhexidine 4% Liquid 1 Application(s) Topical <User Schedule>      Mode: AC/ CMV (Assist Control/ Continuous Mandatory Ventilation)  RR (machine): 30  TV (machine): 500  FiO2: 50  PEEP: 5  ITime: 0.7  MAP: 11  PIP: 23      ICU Vital Signs Last 24 Hrs  T(C): 33.6 (27 Jan 2023 20:30), Max: 38.9 (27 Jan 2023 07:48)  T(F): 92.5 (27 Jan 2023 20:30), Max: 102 (27 Jan 2023 07:48)  HR: 89 (27 Jan 2023 20:30) (83 - 157)  BP: --  BP(mean): --  ABP: 103/68 (27 Jan 2023 20:30) (72/52 - 106/66)  ABP(mean): 82 (27 Jan 2023 20:30) (54 - 87)  RR: 30 (27 Jan 2023 20:30) (0 - 48)  SpO2: 100% (27 Jan 2023 20:30) (50% - 100%)  O2 Parameters below as of 27 Jan 2023 07:30  Patient On (Oxygen Delivery Method): ventilator  O2 Concentration (%): 50    ABG - ( 27 Jan 2023 15:22 )  pH, Arterial: 7.14  pH, Blood: x     /  pCO2: 33    /  pO2: 167   / HCO3: 11    / Base Excess: -16.7 /  SaO2: 100           I&O's Detail    26 Jan 2023 07:01  -  27 Jan 2023 07:00  --------------------------------------------------------  IN:    Amiodarone: 432.9 mL    Dexmedetomidine: 191.1 mL    Enteral Tube Flush: 525 mL    FentaNYL: 54.5 mL    IV PiggyBack: 200 mL    IV PiggyBack: 100 mL    Lactated Ringers: 375 mL    Nepro with Carb Steady: 490 mL    Norepinephrine: 820 mL    Norepinephrine: 563.2 mL    Phenylephrine: 137.4 mL    Phenylephrine: 81 mL    Sodium Bicarbonate: 300 mL    Vasopressin: 24 mL  Total IN: 4294.1 mL    OUT:    Indwelling Catheter - Urethral (mL): 645 mL    Rectal Tube (mL): 600 mL  Total OUT: 1245 mL    Total NET: 3049.1 mL      27 Jan 2023 07:01  -  27 Jan 2023 22:22  --------------------------------------------------------  IN:  Total IN: 0 mL    OUT:    Indwelling Catheter - Urethral (mL): 20 mL  Total OUT: 20 mL    Total NET: -20 mL            LABS:                        14.6   9.64  )-----------( 125      ( 27 Jan 2023 04:10 )             47.4     01-27    143  |  108  |  63<H>  ----------------------------<  267<H>  4.0   |  14<L>  |  3.63<H>    Ca    7.1<L>      27 Jan 2023 19:40  Phos  7.7     01-27  Mg     2.2     01-27    TPro  6.4  /  Alb  1.9<L>  /  TBili  2.4<H>  /  DBili  x   /  AST  34011  /  ALT  5390<H>  /  AlkPhos  93  01-27      CARDIAC MARKERS ( 27 Jan 2023 04:10 )  x     / x     / 151 U/L / x     / x          CAPILLARY BLOOD GLUCOSE        PT/INR - ( 27 Jan 2023 04:45 )   PT: 20.9 sec;   INR: 1.79 ratio         PTT - ( 27 Jan 2023 04:45 )  PTT:38.6 sec    CULTURES:  Culture Results:   Normal Respiratory Citlalli present (01-21-23 @ 10:49)      Physical Examination:    General: No acute distress. Intubated     HEENT: Pupils equal, reactive to light.  Symmetric.    PULM: Breathing comfortably on vent, good BS B/L with no Rales or Rhonchi, no significant sputum production    CVS: Irregular rate and rhythm, no murmurs, rubs, or gallops    ABD: BS+ Soft, nondistended, nontender, no masses    EXT / SKIN: BLE edema with diffuse mottling and prolonged capillary refill. Warm extremities iso aliya hugger.      ADDITIONAL CRITICAL CARE TIME SPENT: 60 mins of time have been spent evaluating, reviewing all available lab and radiologic material, reviewing the EMR and treating this critically ill patient, who has complex medical problems and life threatening organ dysfunction. This also included speaking with the staff, consultants, and family.      ASSESSMENT:  OVERNIGHT EVENTS: Evening labs consistent with worsening MSOF: Cr 2.60 -> 3.63, serum bicarb 21->14,  -> 5390, Lactate 8.3 -> 16.5. Remains on levophed, vaso, and ramo with escalating pressor requirements. Bedside POCUS was severely dilated bilateral atria and ventricles with minimal systolic squeeze, EF estimated to be grossly <15%, plethoric IVC >2cm without respiratory variation, no pericardial effusion visualized. Personally called sister / HCP Renetta to discuss grim prognosis and GOC. Explained patient is in triple pressor shock with escalating pressor requirements and worsening MSOF, offered dobutamine for inotropic support however Renetta opted for DNR and comfort care measures. Family to report to bedside for palliative extubation.      Severe Mixed Septic and Cardiogenic Shock with MSOF  Acute Mixed Respiratory Failure iso Aspiration PNA  Severe Acidosis  ETOH withdrawal syndrome  ETOH Hepatitis  AMARJIT  Hyperammonemia  TODD    Plan:  - Actively titrating pressors to maintain goal MAP >65 until family arrives.  - Upon family arrival will administer glycopyrrolate for reduction of secretions, ativan 2mg IV, dilaudid 2mg IV and initiate dilaudid gtt to promote patient comfort during compassionate extubation.  - Will D/C vasopressor support, amiodarone, sodium bicarbonate, heparin, and all other medications at time of extubation to move to comfort care measures.  - MOLST in chart.     CODE STATUS: DNR/Comfort Care    Additional CCT: 60 mins

## 2023-01-27 NOTE — PROVIDER CONTACT NOTE (EICU) - BACKGROUND
Patient required reintubation for worsening mental status, increased secretions, with worsening shock state. Discussed case with SILVANA Sanchez

## 2023-01-27 NOTE — PROGRESS NOTE ADULT - SUBJECTIVE AND OBJECTIVE BOX
Patient with rapidly deteriorating hypotension, rapidly increasing levophed requirements, on vasopressin, given 2 amp bicarb, patient into svt 180s, hypotensive to the 60s, patient cardioverted at 120J x1 with improvement in HR to 110s-120s.  Stat repeat labs ordered, added tsh.  Post conversion ekg with sinus tach with pvc.  ramo to be restarted.  RR increased to help blow off co2. patient without wbc however continuing to spike temps.  mrsa swab added, vanco added as additional coverage.  coags pending, plan for heparin gtt for ac.     Additional Critical Care time: 40 mins assessing presenting problems of acute illness that poses high probability of life threatening deterioration or end organ damage/dysfunction.  Medical decision making inculding Initiating plan of care, reviewing data, reviewing radiology, discussing with multidisciplinary team, non inclusive of procedures, discussing goals of care with patient/family

## 2023-01-27 NOTE — PROGRESS NOTE ADULT - ASSESSMENT
68 yo m pmhx polysubstance abuse, ETOH abuse, goiter s/p thyroidectomy, HTN, HLD admitted 1/18 with ETOH withdrawal with course complicated by severe LV dysfunction and AMARJIT further complicated by hypoxic respiratory failure in setting of ams and aspiration requiring intubation and shock state requiring vasopressors with course complicated by episodes of SVT/TODD now with     1. Recurrent respiratory failure   2. AMARJIT with worsening acidosis  3. Lactemia  4. Shock state- mixed distributive/cardiogenic    NEURO: Sedated on fentanyl gtt.    CV: Mixed shock state requiring vasopressor therapy, actively titrating levophed for MAP >65.  Vasopressin added as adjunctive therapy   RESP: Hypoxic respiratory failure, ac/vc 4-6 cc/kg tv lung protective strategies, actively titrating fio2/peep for spo2 >92%, aggressive chest pt/pulm lavage/suctioning   RENAL: AMARJIT, avoid nephrotoxic meds, renally dose meds, trend urine output, bun/cr and electrolytes, replace lytes a sneeded.  leal in place, minimal urine output.    GI: NPO, ogt in place  ENDO: POCT q6hr while NPO   ID: Zosyn for coverage   HEME: Heparin for vte ppx   DISPO: Full code.  Called and updated patient's sister Renetta.      Critical Care time: 120 mins assessing presenting problems of acute illness that poses high probability of life threatening deterioration or end organ damage/dysfunction.  Medical decision making including Initiating plan of care, reviewing data, reviewing radiology, discussing with multidisciplinary team, non inclusive of procedures, discussing goals of care with patient/family

## 2023-01-27 NOTE — PROGRESS NOTE ADULT - SUBJECTIVE AND OBJECTIVE BOX
HPI:   67 year old male with PMHx of HTN, Thyroid Goiter s/p thyroidectomy, HLD BIBEMS from home with AMS. Pt's ex wife concerned about pt reporting that he has been acting strange at home and that he has been saying strange things to their 11 year old son. Pt reports that he is depressed and that he owes money to the [employer]. As per EMS pt is on xanax, Ambien and melatonin. Pt denies fever but endorses difficulty breathing, seeing things that other people do not. Pt has not taken xanax in 3 or 4 days. Pt drinks EtOH but denies withdrawal symptoms, non-smoker. Denies SI. Ex wife states speech was slurred. Patient was admitted with altered mental status. Patient was seen in the room and doing better. Patient knows that he is in hospital. Denies any complaints of chest pain, shortness of breath, headache, dizziness, nausea, vomiting or abdominal pain. No fall or head injury. Denies using any drugs.       1/24/23:  EP asked to evaluate this pt with above admission history.  Currently intubated and sedated on Levophed and Midodrine for hypotension.  LVEF 15-20%  TELE: SR 80-90 bpm, PVCs, brief runs SVT and NSVT  EKG: sinus tachycardia 106bpm, APC, GA 160ms, QRS 116ms, QTc 475ms    1/26/23: pt successfully extubated this afternoon.  Seen bedside, family present, opens eyes and follows some commands HR remains elevated, frequent ventricular ectopy.  On levophed and midodrine for BP support.  SVT overnight treated with low dose IV lopressor  TELE:  Atrial fib/flutter rate 120-140's, frequent ectopy, in sinus rhythm earlier  EKG: ?atrial flutter 133 bpm    1/27/23: overnight events noted, pt reintubated for severe shock, on multiple pressors, sedated, s/p DCCV for TODD  TELE: currently SR 80-90 bpm, frequent PVCs      MEDICATIONS  (STANDING):  allopurinol 200 milliGRAM(s) Oral daily  aMIOdarone Infusion 1 mG/Min (33.3 mL/Hr) IV Continuous <Continuous>  aMIOdarone IVPB 150 milliGRAM(s) IV Intermittent once  chlorhexidine 0.12% Liquid 15 milliLiter(s) Oral Mucosa every 12 hours  chlorhexidine 4% Liquid 1 Application(s) Topical <User Schedule>  fentaNYL   Infusion. 0.5 MICROgram(s)/kG/Hr (5.45 mL/Hr) IV Continuous <Continuous>  folic acid 1 milliGRAM(s) Oral daily  heparin   Injectable 5000 Unit(s) SubCutaneous every 8 hours  hydrocortisone sodium succinate Injectable 50 milliGRAM(s) IV Push every 6 hours  lactulose Syrup 20 Gram(s) Oral every 6 hours  levothyroxine Injectable 50 MICROGram(s) IV Push at bedtime  meropenem  IVPB 1000 milliGRAM(s) IV Intermittent every 12 hours  midodrine 10 milliGRAM(s) Oral every 8 hours  norepinephrine Infusion 0.05 MICROgram(s)/kG/Min (5.11 mL/Hr) IV Continuous <Continuous>  pantoprazole  Injectable 40 milliGRAM(s) IV Push daily  phenylephrine    Infusion 0.5 MICROgram(s)/kG/Min (10.2 mL/Hr) IV Continuous <Continuous>  rifAXIMin 550 milliGRAM(s) Oral two times a day  sodium bicarbonate  Infusion 0.207 mEq/kG/Hr (150 mL/Hr) IV Continuous <Continuous>  sodium chloride 3%  Inhalation 4 milliLiter(s) Inhalation every 6 hours  vasopressin Infusion 0.04 Unit(s)/Min (6 mL/Hr) IV Continuous <Continuous>    MEDICATIONS  (PRN):  acetaminophen    Suspension .. 650 milliGRAM(s) Oral every 6 hours PRN Temp greater or equal to 38C (100.4F)  albuterol    90 MICROgram(s) HFA Inhaler 2 Puff(s) Inhalation every 6 hours PRN Bronchospasm  metoprolol tartrate Injectable 5 milliGRAM(s) IV Push every 6 hours PRN HR >130  ondansetron Injectable 4 milliGRAM(s) IV Push every 8 hours PRN Nausea and/or Vomiting  sodium chloride 0.9% lock flush 10 milliLiter(s) IV Push every 1 hour PRN Pre/post blood products, medications, blood draw, and to maintain line patency    Allergies    No Known Allergies      ICU Vital Signs Last 24 Hrs  T(C): 38.9 (27 Jan 2023 07:48), Max: 38.9 (27 Jan 2023 07:48)  T(F): 102 (27 Jan 2023 07:48), Max: 102 (27 Jan 2023 07:48)  HR: 87 (27 Jan 2023 15:28) (87 - 183)  BP: --  BP(mean): --  ABP: 96/64 (27 Jan 2023 15:00) (72/52 - 107/72)  ABP(mean): 76 (27 Jan 2023 15:00) (54 - 87)  RR: 27 (27 Jan 2023 15:00) (0 - 48)  SpO2: 100% (27 Jan 2023 15:28) (95% - 100%)    O2 Parameters below as of 27 Jan 2023 07:30  Patient On (Oxygen Delivery Method): ventilator    O2 Concentration (%): 50                          14.6   9.64  )-----------( 125      ( 27 Jan 2023 04:10 )             47.4     01-27    148<H>  |  115<H>  |  53<H>  ----------------------------<  84  5.0   |  21<L>  |  2.60<H>    Ca    7.9<L>      27 Jan 2023 04:10  Phos  6.2     01-27  Mg     2.0     01-27    TPro  6.8  /  Alb  2.1<L>  /  TBili  2.7<H>  /  DBili  x   /  AST  1318<H>  /  ALT  706<H>  /  AlkPhos  90  01-27    PT/INR - ( 27 Jan 2023 04:45 )   PT: 20.9 sec;   INR: 1.79 ratio       PTT - ( 27 Jan 2023 04:45 )  PTT:38.6 secROS: unable to obtain secondary to pt condition    ABG - ( 27 Jan 2023 15:22 )  pH, Arterial: 7.14  pH, Blood: x     /  pCO2: 33    /  pO2: 167   / HCO3: 11    / Base Excess: -16.7 /  SaO2: 100       - TroponinI hsT: <-975.13      CARDIAC TESTS:  < from: TTE Echo Complete w/o Contrast w/ Doppler (01.20.23 @ 11:00) >   Impression     Summary     Normal appearing mitral valve structure and function.   Mild mitral annular calcification is present.   Mild (1+) mitral regurgitation is present.   Normal aortic valve structure and function.   The ascending aorta and aortic root are dilated.   At least Mild (1+) aortic regurgitation is present.   Normal appearing tricuspid valve structure.   At least Mild (1+) tricuspid valve regurgitation is present.   Normal appearing pulmonic valve structure and function.   The left atrium is mildly dilated.   Severely reduced left ventricular systolic function.   The left ventricle cavity is dilated.   Estimated left ventricular ejection fraction is 15-20 %.   Dr. Will aware of study findings.   The right atrium appears mildly dilated.   Normal appearing right ventricle function.   The IVC is dilated with decreased respiratory variation.

## 2023-01-27 NOTE — CONSULT NOTE ADULT - CONSULT REQUESTED DATE/TIME
19-Jan-2023 11:45
19-Jan-2023 16:15
27-Jan-2023 13:11
19-Jan-2023 15:19
24-Jan-2023 11:30
20-Jan-2023 16:09

## 2023-01-27 NOTE — PROGRESS NOTE ADULT - PROBLEM SELECTOR PLAN 1
patient with longstanding alcoholism , admitted AMS positive for cocaine THC , was sedation , worsening of respiratory status  hypoxia , now intubated , likely aspiration pneumonia , poor mental status , with possible minor  component of CHF  but doubt ,  , pleural effusion , on pressor  improving oxygenation,  improving LFTS renal function ,  on antibiotics
monitor
patient with longstanding alcoholism , admitted AMS positive for cocaine THC , was sedation , worsening of respiratory status  hypoxia , now intubated , likely aspiration pneumonia , poor mental status , with possible minor  component of CHF  but doubt ,  , pleural effusion , on pressor      currently improving oxygenation,  improving LFTS renal function ,  on antibiotics
monitor
Failed attempt at extubation 1/26/23 -- reintubated and now on multiple pressors for shock.
CIWA protocol
monitor
patient with longstanding alcoholism , admitted AMS positive for cocaine THC , was sedation , worsening of respiratory status  hypoxia , now intubated , likely aspiration pneumonia , poor mental status , with possible minor  component of CHF  but doubt ,  , pleural effusion , on pressor  improving oxygenation,  improving LFTS renal function ,  on antibiotics
patient with longstanding alcoholism , admitted AMS positive for cocaine THC , was sedation , worsening of respiratory status  hypoxia , now intubated , likely aspiration pneumonia , poor mental status , with possible minor  component of CHF  but doubt ,  , pleural effusion , on pressors.
ICU team suspects aspiration pneumonia; remains on vent.
monitor
patient with longstanding alcoholism , admitted AMS positive for cocaine THC , was sedation , worsening of respiratory status  hypoxia , now intubated , likely aspiration pneumonia , poor mental status , with possible minor  component of CHF  but doubt ,  , pleural effusion , on pressor  improving oxygenation,  improving LFTS renal function ,  on antibiotics

## 2023-01-27 NOTE — PROCEDURE NOTE - NSPROCDETAILS_GEN_ALL_CORE
patient pre-oxygenated, tube inserted, placement confirmed
location identified, draped/prepped, sterile technique used, needle inserted/introduced/positive blood return obtained via catheter/connected to a pressurized flush line/sutured in place/Seldinger technique
guidewire recovered/lumen(s) aspirated and flushed/sterile dressing applied/sterile technique, catheter placed/ultrasound guidance with use of sterile gel and probe cove
orogastric
patient pre-oxygenated, tube inserted, placement confirmed

## 2023-01-27 NOTE — PROGRESS NOTE ADULT - PROBLEM SELECTOR PROBLEM 5
History of heavy alcohol consumption
History of heavy alcohol consumption
Dilated cardiomyopathy
History of heavy alcohol consumption
Hyperlipidemia
Hyperlipidemia

## 2023-01-27 NOTE — PROGRESS NOTE ADULT - GASTROINTESTINAL
soft/nontender/nondistended
soft/nontender
soft/nontender/nondistended
soft/nontender/nondistended
soft/nondistended
Propranolol Pregnancy And Lactation Text: This medication is Pregnancy Category C and it isn't known if it is safe during pregnancy. It is excreted in breast milk.

## 2023-01-27 NOTE — CONSULT NOTE ADULT - CONSULT REASON
Evaluation of AMARJIT and Hyperkalemia.
SVT and NSVT on telemetry
elevated liver tests
etoh w/d
confusion
respiratory failure , severe left ventricular dysfunction , hypotension

## 2023-01-27 NOTE — PROGRESS NOTE ADULT - PROBLEM SELECTOR PLAN 5
with multi substance abuse with complications multiorgan dysfunction ,
statin held , due to transaminitis , possible alcoholic cirrhosis.
Inotropic support  Cardiology on the case
statin held , due to transaminitis , possible alcoholic cirrhosis.

## 2023-01-27 NOTE — PROGRESS NOTE ADULT - PROBLEM SELECTOR PLAN 3
based on history , severe LVE with global ventricular systolic dysfunction ,was tachycardic  pleural effusion , possible acute on chronic LV dysfunction , possible due to chronic alcoholism ,  chronic ? cocaine use ,   improved heart rate on ventilator     Patient had dilated aortic root , ascending aorta , aortic regurgitation  moderate , will need further work up once his respiratory status improves     currently mild hypotensive , on low dose pressor , likely due to sepsis  poor nutritional status ,    Poor prognosis. no diuretic as patient oxygenation improving and improving heart rate  doubt he is in acute heart failure currently
based on history , severe LVE with global ventricular systolic dysfunction ,was tachycardic  pleural effusion , possible acute on chronic LV dysfunction , possible due to chronic alcoholism ,  chronic ? cocaine use ,   improved heart rate on ventilator     Patient had dilated aortic root , ascending aorta , aortic regurgitation  moderate , will need further work up once his respiratory status improves     currently mild hypotensive , on low dose pressors , likely due to sepsis  poor nutritional status ,    Poor prognosis. no diuretic as patient oxygenation improving and improving heart rate  doubt he is in acute heart failure currently
based on history , severe LVE with global ventricular systolic dysfunction ,was tachycardic  pleural effusion , possible acute on chronic LV dysfunction , possible due to chronic alcoholism ,  chronic ? cocaine use ,   improved heart rate on ventilator     Patient had dilated aortic root , ascending aorta , aortic regurgitation  moderate , will need further work up once his respiratory status improves     currently mild hypotensive , on low dose pressor , likely due to sepsis  poor nutritional status ,    Poor prognosis. no diuretic as patient oxygenation improving and improving heart rate  doubt he is acute heart failure currently
based on history , severe LVE with global ventricular systolic dysfunction ,was tachycardic  pleural effusion , possible acute on chronic LV dysfunction , possible due to chronic alcoholism ,  chronic ? cocaine use ,   improved heart rate on ventilator     Patient had dilated aortic root , ascending aorta , aortic regurgitation  moderate , will need further work up once his respiratory status improves     currently mild hypotensive , on low dose pressor , likely due to sepsis  poor nutritional status ,    Poor prognosis. no diuretic as patient oxygenation improving and improving heart rate  doubt he is in acute heart failure currently
based on history , severe LVE with global ventricular systolic dysfunction ,was tachycardic  pleural effusion , possible acute on chronic LV dysfunction , possible due to chronic alcoholism ,  chronic ? cocaine use ,   improved heart rate on ventilator     Patient had dilated aortic root , ascending aorta , aortic regurgitation  moderate , will need further work up once his respiratory status improves     currently mild hypotensive , on low dose pressor , likely due to sepsis  poor nutritional status ,    Poor prognosis. no diuretic as patient oxygenation improving and improving heart rate  doubt he is in acute heart failure currently
pulmonary toilet  NT suction
Severe LV dysfunction -- chronicity unknown; hypotension precludes beta blocker / Entresto; continue supportive care; PRN Lasix
Vent support  Bicarb drip
Severe LV dysfunction -- chronicity unknown; hypotension precludes beta blocker / Entresto; continue supportive care; prognosis poor.
wean as tolerated  SBT daily
SBT daily  pulmonary toilet
Vent support  SBT daily

## 2023-01-27 NOTE — PROGRESS NOTE ADULT - SUBJECTIVE AND OBJECTIVE BOX
REASON FOR VISIT: CHF    HPI:  67 year old man with a history of HTN, thyroid disease, asthma, alcoholism, apparent drug use (urine tox: benzo + cocaine) admitted on 1/19/23 with altered mentation.  He was subsequently diagnosed with AMARJIT, CHF with severe LV dysfunction, hypoxia / respiratory failure (requiring mechanical ventilation) with suspected aspiration, septic shock, NSVT.    1/20/23 Patient  was receiving sedation , did vomit this morning , subsequent worsening of hypoxia , patient was intubated , patient noted to have severe ventricular systolic dysfunction , dilated aorta , as per his ex wife , patient was not seeing any physician for several  years , patient does drink regularly   patient cxr at the time of admission ,showed no evidence of pleural effusion , however  CXR showed bilateral pleural effusion ,   1/21/23 Patient remain intubated , sedated , mild hypotensive maintained with pressor low dose , improving oxygenation ,   improving renal , LFTS   1/22/23 Patient is remain same  , on lower dose of pressor , oxygen 40% intubated   1/23/'23: intubated sedated.  1/24/23 Patient is remain intubated , decreasing oxygen requirement ,  on pressor , monitor showed brief SVT episodes x 2 ,      and one episode of NSVT    1/25/23: Pt intubated and sedated. Occasional ectopy  1/26/23:  Sedated on vent; eyes open.  1/27/23:  Events past 24 hours noted; sedated on vent    MEDICATIONS  (STANDING):  albuterol/ipratropium for Nebulization 3 milliLiter(s) Nebulizer every 6 hours  allopurinol 200 milliGRAM(s) Oral daily  aMIOdarone Infusion 1 mG/Min (33.3 mL/Hr) IV Continuous <Continuous>  aMIOdarone IVPB 150 milliGRAM(s) IV Intermittent once  chlorhexidine 0.12% Liquid 15 milliLiter(s) Oral Mucosa every 12 hours  chlorhexidine 4% Liquid 1 Application(s) Topical <User Schedule>  fentaNYL   Infusion. 0.5 MICROgram(s)/kG/Hr (5.45 mL/Hr) IV Continuous <Continuous>  folic acid 1 milliGRAM(s) Oral daily  heparin   Injectable 5000 Unit(s) SubCutaneous every 8 hours  hydrocortisone sodium succinate Injectable 50 milliGRAM(s) IV Push every 6 hours  lactulose Syrup 20 Gram(s) Oral every 6 hours  levothyroxine Injectable 50 MICROGram(s) IV Push at bedtime  meropenem  IVPB 1000 milliGRAM(s) IV Intermittent every 12 hours  midodrine 10 milliGRAM(s) Oral every 8 hours  norepinephrine Infusion 0.05 MICROgram(s)/kG/Min (5.11 mL/Hr) IV Continuous <Continuous>  pantoprazole  Injectable 40 milliGRAM(s) IV Push daily  phenylephrine    Infusion 0.5 MICROgram(s)/kG/Min (10.2 mL/Hr) IV Continuous <Continuous>  rifAXIMin 550 milliGRAM(s) Oral two times a day  sodium bicarbonate  Infusion 0.103 mEq/kG/Hr (75 mL/Hr) IV Continuous <Continuous>  sodium chloride 3%  Inhalation 4 milliLiter(s) Inhalation every 6 hours  vasopressin Infusion 0.04 Unit(s)/Min (6 mL/Hr) IV Continuous <Continuous>    Vital Signs Last 24 Hrs  T(C): 38.4 (27 Jan 2023 06:45), Max: 38.7 (27 Jan 2023 02:03)  T(F): 101.2 (27 Jan 2023 06:45), Max: 101.7 (27 Jan 2023 02:03)  HR: 99 (27 Jan 2023 11:12) (99 - 183)  BP: 94/61  RR: 23 (27 Jan 2023 11:00) (0 - 48)  SpO2: 100% (27 Jan 2023 11:12) (83% - 100%)    PHYSICAL EXAM:  Constitutional: intubated, sedated  Respiratory: Breath sounds are clear bilaterally, ETT to vent  Cardiovascular: S1 and S2, regular  Gastrointestinal: Abdomen is distended, soft  Extremities: 1-2+ Edema    LABS:     CARDIAC MARKERS ( 27 Jan 2023 04:10 ) x     / x     / 151 U/L / x     / x                            14.6   9.64  )-----------( 125      ( 27 Jan 2023 04:10 )             47.4     148<H>  |  115<H>  |  53<H>  ----------------------------<  84  5.0   |  21<L>  |  2.60<H>    Ca    7.9<L>      27 Jan 2023 04:10  Phos  6.2     01-27  Mg     2.0     01-27    TPro  6.8  /  Alb  2.1<L>  /  TBili  2.7<H>  /  DBili  x   /  AST  1318<H>  /  ALT  706<H>  /  AlkPhos  90  01-27    TroponinI hsT: 975.13    12 Lead ECG (01.18.23 @ 20:57): Sinus tachycardia with Premature supraventricular complexes, Septal infarct , age undetermined    TTE Echo Complete w/o Contrast w/ Doppler (01.20.23 @ 11:00):   Normal appearing mitral valve structure and function. Mild mitral annular calcification. Mild (1+) mitral regurgitation.   Normal aortic valve structure and function.   The ascending aorta and aortic root are dilated.   At least Mild (1+) aortic regurgitation is present.   Normal appearing tricuspid valve structure. At least Mild (1+) tricuspid valve regurgitation is present.   Normal appearing pulmonic valve structure and function.   The left atrium is mildly dilated.   Severely reduced left ventricular systolic function. The left ventricle cavity is dilated. Estimated left ventricular ejection fraction is 15-20 %.   The right atrium appears mildly dilated.   Normal appearing right ventricle function.   The IVC is dilated with decreased respiratory variation.

## 2023-01-27 NOTE — PROCEDURE NOTE - ADDITIONAL PROCEDURE DETAILS
Dx: Shock  Indication: Need for vasopressors, definitive vascular access
Dx: Shock  Indication: Need for vasopressors, definitive vascular access    Anatomic landmarks utilized  First stick  No complications
ETT placed in setting of AMS, respiratory distress, copious secretions  not included in cc time
NGT placed in setting of respiratory distress/ams  not included in cc time

## 2023-01-27 NOTE — PROCEDURE NOTE - NSPOSTPRCRAD_GEN_A_CORE
post-procedure radiography performed
central line located in the/central line located in the superior vena cava/no pneumothorax/post-procedure radiography performed

## 2023-01-27 NOTE — CONSULT NOTE ADULT - SUBJECTIVE AND OBJECTIVE BOX
Patient is a 67y old  Male who presents with a chief complaint of Altered mental status. (2023 12:16)    HPI:  68 y/o male with h/o HTN, Thyroid Goiter s/p thyroidectomy, HLD was admitted on  for worsening confusion. Pt's ex wife concerned about pt reporting that he has been acting strange at home and that he has been saying strange things to their 11 year old son. Ex wife states speech was slurred. Patient was admitted with altered mental status. Workup noted hallucinations due to Etoh withdrawal found to have severe LV dysfunction and AMARJIT of unclear duration. On  he was transferred to CCU with mixed respiratory failure due to aspiration pneumonitis and worsening mental status requiring intubation. On  the patient was noted with rapidly deteriorating hypotension, rapidly increasing levophed requirements. The patient was cardioverted at 120J x1 with improvement in HR to 110s-120s. Noted febrile and given meropenem and vancomycin IV.    PMH: as above  PSH: as above  Meds: per reconciliation sheet, noted below  MEDICATIONS  (STANDING):  allopurinol 200 milliGRAM(s) Oral daily  aMIOdarone Infusion 1 mG/Min (33.3 mL/Hr) IV Continuous <Continuous>  aMIOdarone IVPB 150 milliGRAM(s) IV Intermittent once  chlorhexidine 0.12% Liquid 15 milliLiter(s) Oral Mucosa every 12 hours  chlorhexidine 4% Liquid 1 Application(s) Topical <User Schedule>  fentaNYL   Infusion. 0.5 MICROgram(s)/kG/Hr (5.45 mL/Hr) IV Continuous <Continuous>  folic acid 1 milliGRAM(s) Oral daily  heparin   Injectable 5000 Unit(s) SubCutaneous every 8 hours  hydrocortisone sodium succinate Injectable 50 milliGRAM(s) IV Push every 6 hours  lactulose Syrup 20 Gram(s) Oral every 6 hours  levothyroxine Injectable 50 MICROGram(s) IV Push at bedtime  meropenem  IVPB 1000 milliGRAM(s) IV Intermittent every 12 hours  midodrine 10 milliGRAM(s) Oral every 8 hours  norepinephrine Infusion 0.05 MICROgram(s)/kG/Min (5.11 mL/Hr) IV Continuous <Continuous>  pantoprazole  Injectable 40 milliGRAM(s) IV Push daily  phenylephrine    Infusion 0.5 MICROgram(s)/kG/Min (10.2 mL/Hr) IV Continuous <Continuous>  rifAXIMin 550 milliGRAM(s) Oral two times a day  sodium bicarbonate  Infusion 0.207 mEq/kG/Hr (150 mL/Hr) IV Continuous <Continuous>  sodium bicarbonate  Injectable 50 milliEquivalent(s) IV Push every 5 minutes  sodium chloride 3%  Inhalation 4 milliLiter(s) Inhalation every 6 hours  vasopressin Infusion 0.04 Unit(s)/Min (6 mL/Hr) IV Continuous <Continuous>    MEDICATIONS  (PRN):  acetaminophen    Suspension .. 650 milliGRAM(s) Oral every 6 hours PRN Temp greater or equal to 38C (100.4F)  albuterol    90 MICROgram(s) HFA Inhaler 2 Puff(s) Inhalation every 6 hours PRN Bronchospasm  metoprolol tartrate Injectable 5 milliGRAM(s) IV Push every 6 hours PRN HR >130  ondansetron Injectable 4 milliGRAM(s) IV Push every 8 hours PRN Nausea and/or Vomiting  sodium chloride 0.9% lock flush 10 milliLiter(s) IV Push every 1 hour PRN Pre/post blood products, medications, blood draw, and to maintain line patency    Allergies    No Known Allergies    Intolerances      Social: no smoking, no alcohol, no illegal drugs; no recent travel, no exposure to TB  FAMILY HISTORY:    no history of premature cardiovascular disease in first degree relatives    ROS: the patient denies fever, no chills, no HA, no seizures, no dizziness, no sore throat, no nasal congestion, no blurry vision, no CP, no palpitations, no SOB, no cough, no abdominal pain, no diarrhea, no N/V, no dysuria, no leg pain, no claudication, no rash, no joint aches, no rectal pain or bleeding, no night sweats  All other systems reviewed and are negative    Vital Signs Last 24 Hrs  T(C): 38.9 (2023 07:48), Max: 38.9 (2023 07:48)  T(F): 102 (2023 07:48), Max: 102 (2023 07:48)  HR: 98 (2023 12:00) (98 - 183)  BP: --  BP(mean): --  RR: 24 (2023 12:00) (0 - 48)  SpO2: 100% (2023 11:12) (83% - 100%)    Parameters below as of 2023 07:30  Patient On (Oxygen Delivery Method): ventilator    O2 Concentration (%): 50  Daily     Daily Weight in k (2023 06:45)    PE:    Constitutional:  No acute distress  HEENT: NC/AT, EOMI, PERRLA, conjunctivae clear; ears and nose atraumatic; pharynx benign  Neck: supple; thyroid not palpable  Back: no tenderness  Respiratory: respiratory effort normal; few rhonchi  Cardiovascular: S1S2 regular, no murmurs  Abdomen: soft, not tender, not distended, positive BS; no liver or spleen organomegaly  Genitourinary: no suprapubic tenderness  Lymphatic: no LN palpable  Musculoskeletal: no muscle tenderness, no joint swelling or tenderness  Extremities: no pedal edema  Neurological/ Psychiatric: confused, judgement and insight impaired; moving all extremities  Skin: no rashes; no palpable lesions    Labs: all available labs reviewed                        14.6   9.64  )-----------( 125      ( 2023 04:10 )             47.4         148<H>  |  115<H>  |  53<H>  ----------------------------<  84  5.0   |  21<L>  |  2.60<H>    Ca    7.9<L>      2023 04:10  Phos  6.2       Mg     2.0         TPro  6.8  /  Alb  2.1<L>  /  TBili  2.7<H>  /  DBili  x   /  AST  1318<H>  /  ALT  706<H>  /  AlkPhos  90       LIVER FUNCTIONS - ( 2023 04:10 )  Alb: 2.1 g/dL / Pro: 6.8 gm/dL / ALK PHOS: 90 U/L / ALT: 706 U/L / AST: 1318 U/L / GGT: x             Culture - Sputum (collected 2023 10:49)  Source: ET Tube ET Tube  Gram Stain (2023 20:28):    Moderate polymorphonuclear leukocytes per low power field    No Squamous epithelial cells per low power field    Few Gram Positive Cocci in Clusters per oil power field  Final Report (2023 17:57):    Normal Respiratory Citlalli present    Radiology: all available radiological tests reviewed    < from: Xray Chest 1 View-PORTABLE IMMEDIATE (Xray Chest 1 View-PORTABLE IMMEDIATE .) (23 @ 01:02) >  Cardiomegaly. There is developing mild pulmonary venous congestion.   Increasing bilateral layering pleural effusions now moderate right and   small on the left..    Tip of left subclavian venous catheter is in the superior vena cava.  Tip of NG tube is in the stomach.    ET tube is high in position above the level of clavicles.    < end of copied text >      Advanced directives addressed: full resuscitation

## 2023-01-27 NOTE — PROGRESS NOTE ADULT - NUTRITIONAL ASSESSMENT
This patient has been assessed with a concern for Malnutrition and has been determined to have a diagnosis/diagnoses of Moderate protein-calorie malnutrition and Morbid obesity (BMI > 40).      
This patient has been assessed with a concern for Malnutrition and has been determined to have a diagnosis/diagnoses of Moderate protein-calorie malnutrition and Morbid obesity (BMI > 40).    This patient is being managed with:   Diet NPO with Tube Feed-  Tube Feeding Modality: Orogastric  Nepro with Carb Steady (NEPRORTH)  Total Volume for 24 Hours (mL): 1680  Continuous  Starting Tube Feed Rate {mL per Hour}: 40  Increase Tube Feed Rate by (mL): 10     Every 6 hours  Until Goal Tube Feed Rate (mL per Hour): 70  Tube Feed Duration (in Hours): 24  Tube Feed Start Time: 00:00  Free Water Flush  Free Water Flush Instructions:  Free water flushes of 40mL/hr (provides 960mL/day); monitor and adjust free water PRN per MD Crowder Carb Prosource TF     Qty per Day:  2  Entered: Jan 23 2023 12:51PM    Diet NPO with Tube Feed-  Tube Feeding Modality: Orogastric  Nepro with Carb Steady (NEPRORTH)  Total Volume for 24 Hours (mL): 960  Continuous  Starting Tube Feed Rate {mL per Hour}: 10  Increase Tube Feed Rate by (mL): 10     Every 4 hours  Until Goal Tube Feed Rate (mL per Hour): 40  Tube Feed Duration (in Hours): 24  Tube Feed Start Time: 01:00  Entered: Jan 21 2023 12:39AM    The following pending diet order is being considered for treatment of Moderate protein-calorie malnutrition and Morbid obesity (BMI > 40):null
This patient has been assessed with a concern for Malnutrition and has been determined to have a diagnosis/diagnoses of Moderate protein-calorie malnutrition and Morbid obesity (BMI > 40).      
This patient has been assessed with a concern for Malnutrition and has been determined to have a diagnosis/diagnoses of Moderate protein-calorie malnutrition and Morbid obesity (BMI > 40).    This patient is being managed with:   Diet NPO with Tube Feed-  Tube Feeding Modality: Orogastric  Nepro with Carb Steady (NEPRORTH)  Total Volume for 24 Hours (mL): 1680  Continuous  Starting Tube Feed Rate {mL per Hour}: 40  Increase Tube Feed Rate by (mL): 10     Every 6 hours  Until Goal Tube Feed Rate (mL per Hour): 70  Tube Feed Duration (in Hours): 24  Tube Feed Start Time: 00:00  Free Water Flush  Free Water Flush Instructions:  Free water flushes of 40mL/hr (provides 960mL/day); monitor and adjust free water PRN per MD Crowder Carb Prosource TF     Qty per Day:  2  Entered: Jan 23 2023 12:51PM    Diet NPO with Tube Feed-  Tube Feeding Modality: Orogastric  Nepro with Carb Steady (NEPRORTH)  Total Volume for 24 Hours (mL): 960  Continuous  Starting Tube Feed Rate {mL per Hour}: 10  Increase Tube Feed Rate by (mL): 10     Every 4 hours  Until Goal Tube Feed Rate (mL per Hour): 40  Tube Feed Duration (in Hours): 24  Tube Feed Start Time: 01:00  Entered: Jan 21 2023 12:39AM    The following pending diet order is being considered for treatment of Moderate protein-calorie malnutrition and Morbid obesity (BMI > 40):null
This patient has been assessed with a concern for Malnutrition and has been determined to have a diagnosis/diagnoses of Moderate protein-calorie malnutrition and Morbid obesity (BMI > 40).    This patient is being managed with:   Diet NPO with Tube Feed-  Tube Feeding Modality: Orogastric  Nepro with Carb Steady (NEPRORTH)  Total Volume for 24 Hours (mL): 1680  Continuous  Starting Tube Feed Rate {mL per Hour}: 40  Increase Tube Feed Rate by (mL): 10     Every 6 hours  Until Goal Tube Feed Rate (mL per Hour): 70  Tube Feed Duration (in Hours): 24  Tube Feed Start Time: 00:00  Free Water Flush  Free Water Flush Instructions:  Free water flushes of 40mL/hr (provides 960mL/day); monitor and adjust free water PRN per MD Crowder Carb Prosource TF     Qty per Day:  2  Entered: Jan 23 2023 12:51PM    
This patient has been assessed with a concern for Malnutrition and has been determined to have a diagnosis/diagnoses of Moderate protein-calorie malnutrition and Morbid obesity (BMI > 40).    This patient is being managed with:   Diet NPO with Tube Feed-  Tube Feeding Modality: Orogastric  Nepro with Carb Steady (NEPRORTH)  Total Volume for 24 Hours (mL): 1680  Continuous  Starting Tube Feed Rate {mL per Hour}: 40  Increase Tube Feed Rate by (mL): 10     Every 6 hours  Until Goal Tube Feed Rate (mL per Hour): 70  Tube Feed Duration (in Hours): 24  Tube Feed Start Time: 00:00  Free Water Flush  Free Water Flush Instructions:  Free water flushes of 75 mL/hr (provides 960mL/day); monitor and adjust free water PRN per MD Crowder Carb Prosource TF     Qty per Day:  2  Entered: Jan 25 2023  9:31AM    
This patient has been assessed with a concern for Malnutrition and has been determined to have a diagnosis/diagnoses of Moderate protein-calorie malnutrition and Morbid obesity (BMI > 40).    This patient is being managed with:   Diet NPO with Tube Feed-  Tube Feeding Modality: Orogastric  Nepro with Carb Steady (NEPRORTH)  Total Volume for 24 Hours (mL): 1680  Continuous  Starting Tube Feed Rate {mL per Hour}: 40  Increase Tube Feed Rate by (mL): 10     Every 6 hours  Until Goal Tube Feed Rate (mL per Hour): 70  Tube Feed Duration (in Hours): 24  Tube Feed Start Time: 00:00  Free Water Flush  Free Water Flush Instructions:  Free water flushes of 40mL/hr (provides 960mL/day); monitor and adjust free water PRN per MD Crowder Carb Prosource TF     Qty per Day:  2  Entered: Jan 23 2023 12:51PM    Diet NPO with Tube Feed-  Tube Feeding Modality: Orogastric  Nepro with Carb Steady (NEPRORTH)  Total Volume for 24 Hours (mL): 960  Continuous  Starting Tube Feed Rate {mL per Hour}: 10  Increase Tube Feed Rate by (mL): 10     Every 4 hours  Until Goal Tube Feed Rate (mL per Hour): 40  Tube Feed Duration (in Hours): 24  Tube Feed Start Time: 01:00  Entered: Jan 21 2023 12:39AM    The following pending diet order is being considered for treatment of Moderate protein-calorie malnutrition and Morbid obesity (BMI > 40):null
This patient has been assessed with a concern for Malnutrition and has been determined to have a diagnosis/diagnoses of Moderate protein-calorie malnutrition and Morbid obesity (BMI > 40).    This patient is being managed with:   Diet NPO with Tube Feed-  Tube Feeding Modality: Orogastric  Nepro with Carb Steady (NEPRORTH)  Total Volume for 24 Hours (mL): 1680  Continuous  Starting Tube Feed Rate {mL per Hour}: 40  Increase Tube Feed Rate by (mL): 10     Every 6 hours  Until Goal Tube Feed Rate (mL per Hour): 70  Tube Feed Duration (in Hours): 24  Tube Feed Start Time: 00:00  Free Water Flush  Free Water Flush Instructions:  Free water flushes of 40mL/hr (provides 960mL/day); monitor and adjust free water PRN per MD Crowder Carb Prosource TF     Qty per Day:  2  Entered: Jan 23 2023 12:51PM    Diet NPO with Tube Feed-  Tube Feeding Modality: Orogastric  Nepro with Carb Steady (NEPRORTH)  Total Volume for 24 Hours (mL): 960  Continuous  Starting Tube Feed Rate {mL per Hour}: 10  Increase Tube Feed Rate by (mL): 10     Every 4 hours  Until Goal Tube Feed Rate (mL per Hour): 40  Tube Feed Duration (in Hours): 24  Tube Feed Start Time: 01:00  Entered: Jan 21 2023 12:39AM    The following pending diet order is being considered for treatment of Moderate protein-calorie malnutrition and Morbid obesity (BMI > 40):null

## 2023-01-27 NOTE — PROGRESS NOTE ADULT - ASSESSMENT
68 yo man with HTN and known ETOH abuse admitted with AMS.  Evidence of cocaine use though pt denies.  --AMARJIT with unclear baseline function: in the setting of recent Cocaine use and ARB.    Continue to hold ARB for now.    D/c further IVF for now.  --Continue to monitor renal function and LFTS.  Continue to trend CPK.  --check repeat ammonia level.  --AMS : due to illicit drugs and ETOH.  --LE edema : check urine protein and Echo, [place] with ETOH hx.    1/20 MK   - AMARJIT improving     monitor off ivf     off arb    unclear if with component of ckd   - CHF with ef of 15%  - hypoxia with recent aspiration   - ? asymptomatic hyperuricemia : fu trend     and start allopurinol   dw dr bauer    1/21 MK   - AMARJIT improving     monitor off ivf     off arb    unclear if with component of ckd   - CHF with ef of 15%  - hypoxia with recent aspiration, no intubated     on zosyn renally dosed  - ? cirrhosis on rifampin   - ? asymptomatic hyperuricemia : fu trend      allopurinol started     1/22 MK   - AMARJIT improving     monitor off ivf     off arb    unclear if with component of ckd   - CHF with ef of 15%  - hypoxia with recent aspiration, no intubated     on zosyn renally dosed  - ? ETOH cirrhosis on rifampin and lactuose  - ? asymptomatic hyperuricemia : fu trend      allopurinol started, fu levels today   - FLC elevated, check MOSES send     1/23 MK   - AMARJIT improving     monitor off ivf     off arb    unclear if with component of ckd   - CHF with ef of 15%  - hypoxia with recent aspiration, now intubated     on zosyn renally dosed    planned for weaning of sedation   - ? ETOH cirrhosis on rifampin and lactuose  - ? asymptomatic hyperuricemia : fu trend      allopurinol started, fu levels today      will inc dose of allopurinol further   - FLC elevated, check MOSES send     does not require 24 hour collection   dw rn     1/24 MK   - AMARJIT improving and leveling off ? ckd at baseline   - CHF with ef of 15%  - hypoxia with recent aspiration, now intubated     on zosyn renally dosed    remains on sedation   - ? ETOH cirrhosis on rifampin and lactuose  - ? asymptomatic hyperuricemia : fu trend      allopurinol started,uric acid dec   - FLC elevated, check MOSES send     does not require 24 hour collection     will repeat FLC ordered when in AMARJIT     1/25 SY  --AMARJIT : improved.  Unclear if a degree of CKD.  Creat slightly increased today.  --Resp : CHF/Asp PNA : EF 15 %--continue vent support and abtx.  --ETOH : continue rifaximin and lactulose  --Follow up serum MOSES    1/26 SY  --AMARJIT : improved and holding with unclear baseline fx.  --Resp : CHF/ asp PNA : EF 15 %   --continue vent support.     I >> O --Continue water flush due to increased sodium level.     Hold diuresis for now.      1/27 SY  --Acute cardiac decompensation with hemodynamic collapse.      Continue pressor support  --AMARJIT with acute worsening and Lactic acidosis : continue HCO3 for now.    Exp to pt's daughters, further aggressive intervention may be futile due to very poor cardiac function.  --EF 15 % with ETOH and Cocaine abuse.

## 2023-01-27 NOTE — PROGRESS NOTE ADULT - PROBLEM SELECTOR PROBLEM 3
Dilated cardiomyopathy
Dilated cardiomyopathy
Acute respiratory failure with hypoxia
Acute respiratory failure with hypoxia
Acute on chronic systolic congestive heart failure
Acute respiratory failure with hypoxia
Acute on chronic systolic congestive heart failure
Acute on chronic systolic congestive heart failure
Acute respiratory failure with hypoxia
Acute respiratory failure with hypoxia

## 2023-01-27 NOTE — PROGRESS NOTE ADULT - SUBJECTIVE AND OBJECTIVE BOX
NEPHROLOGY INTERVAL HPI/OVERNIGHT EVENTS:    Date of Service: 23 @ 18:41    --Yesterdy's events noted.   Extubated successfully.  Went into rapid a fib with poor response to Amiodarone and BB.           later SVT with 's and hypotension with SBP in 60's--reintubated.  Now on Norepi, Phenylephrine and Vasopressin.           Pt's 2 daughters at bedside.  Exp grave prognosis.  --Remains on vent.  Sedation and on pressors.  UO 2700cc  --Remains on vent. Remains on pressors. UO 1300cc.   intubated, sedation to be weaned off, oxygenating well + uop    intubated and sedated, remains on pressors    intubated yesterday on precedex and pressors   pt with episode of emesis with hypoxia, concern for aspiration with adjustment of his ativan dose.  wife at bedside.  unofficial echo with ef of 15%  HPI: Hx obtained from ex wife.  68 yo man with PMHX of HTN,( on Losartan), hx of asthma, living alone, brought to Ed when found to be confused with hallucinations.  Per pt's wife, pt was well until a few days ago.  Yesterday noted with slurred speech and confusion and relates ETOH hx of drinking vodka/screwdriver 4-5 x/day.  On Losartan for HTN but has not seen PMD since pandemic.  No known hx of CKD.  Creat 2.76  and abnormal LFTs.  Urine positive for Cocaine and Benzo, though pt adamantly  denies  illicit drug use  CT non contrast with no intra abdominal pathology, positive for small bilateral pleural effusion with cardiomegaly.    PMHX and PSHX.  --HTN  --Thyroid goiter --post Thyroidectomy  --Post intranasal polyp resection.  --Hx of Asthma    MEDICATIONS  (STANDING):  allopurinol 200 milliGRAM(s) Oral daily  aMIOdarone Infusion 1 mG/Min (33.3 mL/Hr) IV Continuous <Continuous>  aMIOdarone IVPB 150 milliGRAM(s) IV Intermittent once  chlorhexidine 0.12% Liquid 15 milliLiter(s) Oral Mucosa every 12 hours  chlorhexidine 4% Liquid 1 Application(s) Topical <User Schedule>  fentaNYL   Infusion. 0.5 MICROgram(s)/kG/Hr (5.45 mL/Hr) IV Continuous <Continuous>  folic acid 1 milliGRAM(s) Oral daily  heparin   Injectable 5000 Unit(s) SubCutaneous every 8 hours  hydrocortisone sodium succinate Injectable 50 milliGRAM(s) IV Push every 6 hours  lactulose Syrup 20 Gram(s) Oral every 6 hours  levothyroxine Injectable 50 MICROGram(s) IV Push at bedtime  meropenem  IVPB 1000 milliGRAM(s) IV Intermittent every 12 hours  midodrine 10 milliGRAM(s) Oral every 8 hours  norepinephrine Infusion 0.05 MICROgram(s)/kG/Min (5.11 mL/Hr) IV Continuous <Continuous>  pantoprazole  Injectable 40 milliGRAM(s) IV Push daily  phenylephrine    Infusion 0.5 MICROgram(s)/kG/Min (10.2 mL/Hr) IV Continuous <Continuous>  rifAXIMin 550 milliGRAM(s) Oral two times a day  sodium bicarbonate  Infusion 0.207 mEq/kG/Hr (150 mL/Hr) IV Continuous <Continuous>  sodium chloride 3%  Inhalation 4 milliLiter(s) Inhalation every 6 hours  vasopressin Infusion 0.04 Unit(s)/Min (6 mL/Hr) IV Continuous <Continuous>    MEDICATIONS  (PRN):  acetaminophen    Suspension .. 650 milliGRAM(s) Oral every 6 hours PRN Temp greater or equal to 38C (100.4F)  albuterol    90 MICROgram(s) HFA Inhaler 2 Puff(s) Inhalation every 6 hours PRN Bronchospasm  metoprolol tartrate Injectable 5 milliGRAM(s) IV Push every 6 hours PRN HR >130  ondansetron Injectable 4 milliGRAM(s) IV Push every 8 hours PRN Nausea and/or Vomiting  sodium chloride 0.9% lock flush 10 milliLiter(s) IV Push every 1 hour PRN Pre/post blood products, medications, blood draw, and to maintain line patency    Vital Signs Last 24 Hrs  T(C): 38.9 (2023 07:48), Max: 38.9 (2023 07:48)  T(F): 102 (2023 07:48), Max: 102 (2023 07:48)  HR: 84 (2023 18:00) (83 - 183)  BP: --  BP(mean): --  RR: 30 (2023 18:00) (0 - 48)  SpO2: 50% (2023 18:12) (50% - 100%)    Parameters below as of 2023 07:30  Patient On (Oxygen Delivery Method): ventilator    O2 Concentration (%): 50  Daily     Daily Weight in k (2023 06:45)     @ 07: @ 07:00  --------------------------------------------------------  IN: 4294.1 mL / OUT: 1245 mL / NET: 3049.1 mL     @ 07: @ 18:41  --------------------------------------------------------  IN: 0 mL / OUT: 20 mL / NET: -20 mL    PHYSICAL EXAM:  GENERAL: on vent/sedated/on pressors  CHEST/LUNG: scattered rhonchi  HEART: S1S2 RRR  ABDOMEN: distended  EXTREMITIES: 2-3+ edema  SKIN:     LABS:                        14.6   9.64  )-----------( 125      ( 2023 04:10 )             47.4         148<H>  |  115<H>  |  53<H>  ----------------------------<  84  5.0   |  21<L>  |  2.60<H>    Ca    7.9<L>      2023 04:10  Phos  6.2       Mg     2.0         TPro  6.8  /  Alb  2.1<L>  /  TBili  2.7<H>  /  DBili  x   /  AST  1318<H>  /  ALT  706<H>  /  AlkPhos  90      PT/INR - ( 2023 04:45 )   PT: 20.9 sec;   INR: 1.79 ratio         PTT - ( 2023 04:45 )  PTT:38.6 sec    Magnesium, Serum: 2.0 mg/dL ( @ 04:10)  Phosphorus Level, Serum: 6.2 mg/dL ( @ 04:10)  Magnesium, Serum: 2.0 mg/dL ( @ 00:20)  Phosphorus Level, Serum: 4.6 mg/dL ( @ 00:20)    ABG - ( 2023 15:22 )  pH, Arterial: 7.14  pH, Blood: x     /  pCO2: 33    /  pO2: 167   / HCO3: 11    / Base Excess: -16.7 /  SaO2: 100                   RADIOLOGY & ADDITIONAL TESTS:

## 2023-01-27 NOTE — PROGRESS NOTE ADULT - ASSESSMENT
1. Elevated liver tests initially secondary to substance abuse (alcohol, cocaine) with improvement now with shock liver on multiple pressors. Multifactorial now from infection and cardiac dysfunction    Recommendation  1. Trend LFTs and INR  2. Supportive care, poor prognosis

## 2023-01-27 NOTE — PROGRESS NOTE ADULT - PROBLEM SELECTOR PROBLEM 4
Alcohol dependence with withdrawal
Alcohol dependence with withdrawal
Hyperlipidemia
Alcohol dependence with withdrawal
Hyperlipidemia
Alcohol dependence with withdrawal
Cardiac arrhythmia
Hyperlipidemia
Cardiac arrhythmia
Cardiac arrhythmia

## 2023-01-27 NOTE — CHART NOTE - NSCHARTNOTEFT_GEN_A_CORE
CCU Event/Progress Note    HPI:    HPI:    S:    Pt seen and examined  HD # 10  FULL CODE  67M   PMHx of HTN, Thyroid Goiter s/p thyroidectomy, HLD BIBEMS from home with AMS. Pt's ex wife concerned about pt reporting that he has been acting strange at home and that he has been saying strange things to their 11 year old son. Pt reports that he is depressed and that he owes money to the IRS. As per EMS pt is on xanax, Ambien and melatonin. Pt denies fever but endorses difficulty breathing, seeing things that other people do not. Pt has not taken xanax in 3 or 4 days. Pt drinks EtOH but denies withdrawal symptoms, non-smoker. Denies SI. Ex wife states speech was slurred. Patient was admitted with altered mental status. Patient was seen in the room and doing better. Patient knows that he is in hospital. Denies any complaints of chest pain, shortness of breath, headache, dizziness, nausea, vomiting or abdominal pain. No fall or head injury. Denies using any drugs    ICU consult today for resp distress s/p episode vomiting    1/20 PM: In resp distress, near resp arrest; intubated. Central vascular access obtained. Pressors started.   1/22 PM: Remains intubated, on levophed. On precedex drip and ATC BZD.    1/26 PM: Weaned to extubate today. Remains on pressors.  1/27 PM: Rapid decompensation overnight, requiring emergent reintubation, unstable tachyarrythmia requiring DCCV. Now in severe MSOF on 3 pressors, alkali drip, ventilator being actively titrated.     ROS: Unable to obtain      Allergies    No Known Allergies    Intolerances        MEDICATIONS  (STANDING):  allopurinol 200 milliGRAM(s) Oral daily  aMIOdarone Infusion 1 mG/Min (33.3 mL/Hr) IV Continuous <Continuous>  aMIOdarone IVPB 150 milliGRAM(s) IV Intermittent once  chlorhexidine 0.12% Liquid 15 milliLiter(s) Oral Mucosa every 12 hours  chlorhexidine 4% Liquid 1 Application(s) Topical <User Schedule>  fentaNYL   Infusion. 0.5 MICROgram(s)/kG/Hr (5.45 mL/Hr) IV Continuous <Continuous>  folic acid 1 milliGRAM(s) Oral daily  heparin   Injectable 5000 Unit(s) SubCutaneous every 8 hours  hydrocortisone sodium succinate Injectable 50 milliGRAM(s) IV Push every 6 hours  lactulose Syrup 20 Gram(s) Oral every 6 hours  levothyroxine Injectable 50 MICROGram(s) IV Push at bedtime  meropenem  IVPB 1000 milliGRAM(s) IV Intermittent every 12 hours  midodrine 10 milliGRAM(s) Oral every 8 hours  norepinephrine Infusion 0.05 MICROgram(s)/kG/Min (5.11 mL/Hr) IV Continuous <Continuous>  pantoprazole  Injectable 40 milliGRAM(s) IV Push daily  phenylephrine    Infusion 0.5 MICROgram(s)/kG/Min (10.2 mL/Hr) IV Continuous <Continuous>  rifAXIMin 550 milliGRAM(s) Oral two times a day  sodium bicarbonate  Infusion 0.207 mEq/kG/Hr (150 mL/Hr) IV Continuous <Continuous>  sodium bicarbonate  Injectable 50 milliEquivalent(s) IV Push every 5 minutes  sodium chloride 3%  Inhalation 4 milliLiter(s) Inhalation every 6 hours  vasopressin Infusion 0.04 Unit(s)/Min (6 mL/Hr) IV Continuous <Continuous>    MEDICATIONS  (PRN):  acetaminophen    Suspension .. 650 milliGRAM(s) Oral every 6 hours PRN Temp greater or equal to 38C (100.4F)  albuterol    90 MICROgram(s) HFA Inhaler 2 Puff(s) Inhalation every 6 hours PRN Bronchospasm  metoprolol tartrate Injectable 5 milliGRAM(s) IV Push every 6 hours PRN HR >130  ondansetron Injectable 4 milliGRAM(s) IV Push every 8 hours PRN Nausea and/or Vomiting  sodium chloride 0.9% lock flush 10 milliLiter(s) IV Push every 1 hour PRN Pre/post blood products, medications, blood draw, and to maintain line patency      Drug Dosing Weight    Weight (kg): 108.9 (18 Jan 2023 17:31)    PAST MEDICAL & SURGICAL HISTORY:  Intranasal mass      Hypertension, unspecified type      Hyperlipidemia, unspecified hyperlipidemia type      Thyroid nodule  left thyroidectomy      Asthma      H/O partial thyroidectomy  2008      S/P T&amp;A (status post tonsillectomy and adenoidectomy)  1965          FAMILY HISTORY:          ROS: See HPI; otherwise, all systems reviewed and negative.    O:    ICU Vital Signs Last 24 Hrs  T(C): 38.9 (27 Jan 2023 07:48), Max: 38.9 (27 Jan 2023 07:48)  T(F): 102 (27 Jan 2023 07:48), Max: 102 (27 Jan 2023 07:48)  HR: 92 (27 Jan 2023 13:11) (92 - 183)  BP: --  BP(mean): --  ABP: 94/61 (27 Jan 2023 12:00) (72/52 - 107/72)  ABP(mean): 74 (27 Jan 2023 12:00) (54 - 111)  RR: 24 (27 Jan 2023 12:00) (0 - 48)  SpO2: 100% (27 Jan 2023 13:11) (83% - 100%)    O2 Parameters below as of 27 Jan 2023 07:30  Patient On (Oxygen Delivery Method): ventilator    O2 Concentration (%): 50        ABG - ( 27 Jan 2023 12:45 )  pH, Arterial: 7.13  pH, Blood: x     /  pCO2: 38    /  pO2: 191   / HCO3: 13    / Base Excess: -15.8 /  SaO2: 100                 I&O's Detail    26 Jan 2023 07:01  -  27 Jan 2023 07:00  --------------------------------------------------------  IN:    Amiodarone: 432.9 mL    Dexmedetomidine: 191.1 mL    Enteral Tube Flush: 525 mL    FentaNYL: 54.5 mL    IV PiggyBack: 100 mL    IV PiggyBack: 200 mL    Lactated Ringers: 375 mL    Nepro with Carb Steady: 490 mL    Norepinephrine: 820 mL    Norepinephrine: 563.2 mL    Phenylephrine: 137.4 mL    Phenylephrine: 81 mL    Sodium Bicarbonate: 300 mL    Vasopressin: 24 mL  Total IN: 4294.1 mL    OUT:    Indwelling Catheter - Urethral (mL): 645 mL    Rectal Tube (mL): 600 mL  Total OUT: 1245 mL    Total NET: 3049.1 mL          Mode: AC/ CMV (Assist Control/ Continuous Mandatory Ventilation)  RR (machine): 30  TV (machine): 500  FiO2: 50  PEEP: 5  ITime: 0.7  MAP: 11  PIP: 36      PE:    Adult M lying in bed  No JVD trachea midline  + ETT in place connected to ventilator  S1S2+  Coarse BS B/L  Abd soft NTND  + anasarca  Mottled extremities    LABS:    CBC Full  -  ( 27 Jan 2023 04:10 )  WBC Count : 9.64 K/uL  RBC Count : 4.79 M/uL  Hemoglobin : 14.6 g/dL  Hematocrit : 47.4 %  Platelet Count - Automated : 125 K/uL  Mean Cell Volume : 99.0 fl  Mean Cell Hemoglobin : 30.5 pg  Mean Cell Hemoglobin Concentration : 30.8 gm/dL  Auto Neutrophil # : x  Auto Lymphocyte # : x  Auto Monocyte # : x  Auto Eosinophil # : x  Auto Basophil # : x  Auto Neutrophil % : x  Auto Lymphocyte % : x  Auto Monocyte % : x  Auto Eosinophil % : x  Auto Basophil % : x    01-27    148<H>  |  115<H>  |  53<H>  ----------------------------<  84  5.0   |  21<L>  |  2.60<H>    Ca    7.9<L>      27 Jan 2023 04:10  Phos  6.2     01-27  Mg     2.0     01-27    TPro  6.8  /  Alb  2.1<L>  /  TBili  2.7<H>  /  DBili  x   /  AST  1318<H>  /  ALT  706<H>  /  AlkPhos  90  01-27    PT/INR - ( 27 Jan 2023 04:45 )   PT: 20.9 sec;   INR: 1.79 ratio         PTT - ( 27 Jan 2023 04:45 )  PTT:38.6 sec    CAPILLARY BLOOD GLUCOSE        CARDIAC MARKERS ( 27 Jan 2023 04:10 )  x     / x     / 151 U/L / x     / x          LIVER FUNCTIONS - ( 27 Jan 2023 04:10 )  Alb: 2.1 g/dL / Pro: 6.8 gm/dL / ALK PHOS: 90 U/L / ALT: 706 U/L / AST: 1318 U/L / GGT: x               A:    67M  HD # 10  FULL CODE    Here for:    1. Severe shock  2. MSOF  3. Severe acidosis  4. Acute mixed resp failure 2/2  5. PNA, aspiration  6. Shock, suspect septic 2/2 above  7. Alcohol withdrawal syndrome  8. Alcoholic hepatitis  9. CM, suspect alcohol induced  10. AMARJIT ? CKD  11. Elevated ammonia  12. TODD, ectopy    This patient requires critical care for support of one or more vital organ systems with a high probability of imminent or life threatening deterioration in his/her condition    P:    Alcohol withdrawal syndrome  Complicated by aspiration PNA, septic shock, severe CM, MSOF    Pt seemed to be improving into yesterday  Pt extubated yesterday around noon    Deteriorated rapidly overnight    Now back intubated  On 3 pressors, very high doses  In MSOF (renal, hepatic, cardiac)    Analgosedation with fentanyl monotherapy; avoid propofol in shock state and severe CM. Daily sedation vacations. Goal RASS 0 to -2.  HD monitoring, on levophed + vasopressin + neosynephrine to maintain MAP > 65; EF < 15%; severe LV dysfxn with global dysfxn; ?alcohol +/- cocaine use; on amiodarone drip s/p 150mg IV load x 2, DCCV last night; Cardiology note appreciated.  s/p reintubation, actively titrating and manipulating ventilator to optimize ventilation and oxygenation, acid-base status while minimizing barotrauma; changed at this time by this author to VAC 30/500/5/.50; insp time decreased to 0.7 to keep near 1:2 ratio; f/u ABG ordered; Ppeak mid 20's, Pplat 17.  Lactulose + rifaxamin for hyperammonia, increase from 10mg BID to 20mg QID, titrate to 4 BM daily; lvl unchanged  Broad spectrum abx increased to merrem empiric coverage for asp PNA, f/u cultures, ID involved  VTE ppx SCD + SQH;   s/p Vitamin K for elevated INR  AMARJIT, ? CKD, no indications for emergent RRT and would not tolerate at this time 2/2 hemodynamic instability; maintain leal, monitor I/O's, avoid renal toxic meds  Give 2 amps bicarb IVP, increase drip from 75 to 150cc/hr  Alcoholic hepatitis, GI involved  f/u labs, replete lytes PRN  Maintain central vascular access  Hgb 14.6, Plt 125  f/u labs, replete lytes PRN  Maintain K > 4 Mg Ph > 3 Mg > 2    Dispo: Continue critical care. Continue titrating BZDs, abx. SBTs, wean vent as able, hopeful to extubate today; optimize cardiac chronotropy/ectopy    TOTAL CRITICAL CARE TIME:  40/200 minutes (EXCLUSIVE of any non bundled procedures)    Note: This time spent INCLUDES time spent directly as this patient's bedside with evaluation, review of chart including review of laboratory and imaging studies, interpretation of vital signs and cardiac output measurements, any necessary ventilator management, and time spent discussing plan of care with patient and family, including goals of care discussion.

## 2023-01-27 NOTE — PROGRESS NOTE ADULT - SUBJECTIVE AND OBJECTIVE BOX
Patient is a 67y old  Male who presents with a chief complaint of Altered mental status. (27 Jan 2023 11:24)      Subjective: Patient extubated yesterday, however, overnight had arrhythmia, hypotension requiring reintubation now on multiple pressors.       PAST MEDICAL & SURGICAL HISTORY:  Intranasal mass      Hypertension, unspecified type      Hyperlipidemia, unspecified hyperlipidemia type      Thyroid nodule  left thyroidectomy      Asthma      H/O partial thyroidectomy  2008      S/P T&amp;A (status post tonsillectomy and adenoidectomy)  1965          MEDICATIONS  (STANDING):  albuterol/ipratropium for Nebulization 3 milliLiter(s) Nebulizer every 6 hours  allopurinol 200 milliGRAM(s) Oral daily  aMIOdarone Infusion 1 mG/Min (33.3 mL/Hr) IV Continuous <Continuous>  aMIOdarone IVPB 150 milliGRAM(s) IV Intermittent once  chlorhexidine 0.12% Liquid 15 milliLiter(s) Oral Mucosa every 12 hours  chlorhexidine 4% Liquid 1 Application(s) Topical <User Schedule>  fentaNYL   Infusion. 0.5 MICROgram(s)/kG/Hr (5.45 mL/Hr) IV Continuous <Continuous>  folic acid 1 milliGRAM(s) Oral daily  heparin   Injectable 5000 Unit(s) SubCutaneous every 8 hours  hydrocortisone sodium succinate Injectable 50 milliGRAM(s) IV Push every 6 hours  lactulose Syrup 20 Gram(s) Oral every 6 hours  levothyroxine Injectable 50 MICROGram(s) IV Push at bedtime  meropenem  IVPB 1000 milliGRAM(s) IV Intermittent every 12 hours  midodrine 10 milliGRAM(s) Oral every 8 hours  norepinephrine Infusion 0.05 MICROgram(s)/kG/Min (5.11 mL/Hr) IV Continuous <Continuous>  pantoprazole  Injectable 40 milliGRAM(s) IV Push daily  phenylephrine    Infusion 0.5 MICROgram(s)/kG/Min (10.2 mL/Hr) IV Continuous <Continuous>  rifAXIMin 550 milliGRAM(s) Oral two times a day  sodium bicarbonate  Infusion 0.103 mEq/kG/Hr (75 mL/Hr) IV Continuous <Continuous>  sodium chloride 3%  Inhalation 4 milliLiter(s) Inhalation every 6 hours  vasopressin Infusion 0.04 Unit(s)/Min (6 mL/Hr) IV Continuous <Continuous>    MEDICATIONS  (PRN):  acetaminophen    Suspension .. 650 milliGRAM(s) Oral every 6 hours PRN Temp greater or equal to 38C (100.4F)  metoprolol tartrate Injectable 5 milliGRAM(s) IV Push every 6 hours PRN HR >130  ondansetron Injectable 4 milliGRAM(s) IV Push every 8 hours PRN Nausea and/or Vomiting  sodium chloride 0.9% lock flush 10 milliLiter(s) IV Push every 1 hour PRN Pre/post blood products, medications, blood draw, and to maintain line patency      REVIEW OF SYSTEMS:    RESPIRATORY: No shortness of breath  CARDIOVASCULAR: No chest pain  All other review of systems is negative unless indicated above.    Vital Signs Last 24 Hrs  T(C): 38.9 (27 Jan 2023 07:48), Max: 38.9 (27 Jan 2023 07:48)  T(F): 102 (27 Jan 2023 07:48), Max: 102 (27 Jan 2023 07:48)  HR: 99 (27 Jan 2023 11:12) (99 - 183)  BP: --  BP(mean): --  RR: 23 (27 Jan 2023 11:00) (0 - 48)  SpO2: 100% (27 Jan 2023 11:12) (83% - 100%)    Parameters below as of 27 Jan 2023 07:30  Patient On (Oxygen Delivery Method): ventilator    O2 Concentration (%): 50    PHYSICAL EXAM:    Constitutional: intubated, sedated  Respiratory: coarse breath sounds  Gastrointestinal: , soft, mild distension  Extremities: No peripheral edema  Psychiatric: sedated    LABS:                        14.6   9.64  )-----------( 125      ( 27 Jan 2023 04:10 )             47.4     01-27    148<H>  |  115<H>  |  53<H>  ----------------------------<  84  5.0   |  21<L>  |  2.60<H>    Ca    7.9<L>      27 Jan 2023 04:10  Phos  6.2     01-27  Mg     2.0     01-27    TPro  6.8  /  Alb  2.1<L>  /  TBili  2.7<H>  /  DBili  x   /  AST  1318<H>  /  ALT  706<H>  /  AlkPhos  90  01-27    PT/INR - ( 27 Jan 2023 04:45 )   PT: 20.9 sec;   INR: 1.79 ratio         PTT - ( 27 Jan 2023 04:45 )  PTT:38.6 sec  LIVER FUNCTIONS - ( 27 Jan 2023 04:10 )  Alb: 2.1 g/dL / Pro: 6.8 gm/dL / ALK PHOS: 90 U/L / ALT: 706 U/L / AST: 1318 U/L / GGT: x             RADIOLOGY & ADDITIONAL STUDIES:

## 2023-01-27 NOTE — PROGRESS NOTE ADULT - SUBJECTIVE AND OBJECTIVE BOX
Patient is a 67y old  Male who presents with a chief complaint of Altered mental status. (26 Jan 2023 16:07)      BRIEF HOSPITAL COURSE:   66 yo m pmhx polysubstance abuse, ETOH abuse, goiter s/p thyroidectomy, HTN, HLD admitted 1/18 with ETOH withdrawal with course complicated by severe LV dysfunction and AMARJIT further complicated by hypoxic respiratory failure in setting of ams and aspiration requiring intubation and shock state requiring vasopressors with course complicated by episodes of SVT/TODD.      1/26: extubated, started on amio for rate control    Events last 24 hours:   Upon arrival to shift patient tachypneic with thick secretions, patient positioned up right, ordered for saline nebs/duonebs, frequent chest pt/oral/ngt suctioning that nursing staff and myself were frequently performing.  Patient also with SVT to 200, given IVP lopressor with minimal improvement, rebolused with amio 150mg ivpb.  Through shift patient becoming progressively more lethargic.  This evening patient extremely lethargic, becoming progressively more hypotensive, titrating up on ramo, gurgling in secretions and was reintubated for ams/respiratory distress.  During intubation patient profoundly hypotensive, uptitrated on ramo, started on high dose levo, given 2 ramo sticks. Stat labs ordered, lactate ~6, worsening renal failure, initially ordered for LR.  post intubation abg with primary MA.  Patient ordered for sodium bicarb gtt.  Patient transitioned to levophed for BP support, ramo titrated off, vasopressin added.  Continued on amio gtt for rate control.        PAST MEDICAL & SURGICAL HISTORY:  Intranasal mass      Hypertension, unspecified type      Hyperlipidemia, unspecified hyperlipidemia type      Thyroid nodule  left thyroidectomy      Asthma      H/O partial thyroidectomy  2008      S/P T&amp;A (status post tonsillectomy and adenoidectomy)  1965        Allergies    No Known Allergies    Intolerances      Medications:  piperacillin/tazobactam IVPB.. 3.375 Gram(s) IV Intermittent every 8 hours  rifAXIMin 550 milliGRAM(s) Oral two times a day    aMIOdarone Infusion 1 mG/Min IV Continuous <Continuous>  aMIOdarone IVPB 150 milliGRAM(s) IV Intermittent once  metoprolol tartrate Injectable 5 milliGRAM(s) IV Push every 6 hours PRN  midodrine 10 milliGRAM(s) Oral every 8 hours  norepinephrine Infusion 0.05 MICROgram(s)/kG/Min IV Continuous <Continuous>    albuterol/ipratropium for Nebulization 3 milliLiter(s) Nebulizer every 6 hours  sodium chloride 3%  Inhalation 4 milliLiter(s) Inhalation every 6 hours    acetaminophen    Suspension .. 650 milliGRAM(s) Oral every 6 hours PRN  fentaNYL   Infusion. 0.5 MICROgram(s)/kG/Hr IV Continuous <Continuous>  ondansetron Injectable 4 milliGRAM(s) IV Push every 8 hours PRN      heparin   Injectable 5000 Unit(s) SubCutaneous every 8 hours    lactulose Syrup 20 Gram(s) Oral every 6 hours  pantoprazole  Injectable 40 milliGRAM(s) IV Push daily      allopurinol 200 milliGRAM(s) Oral daily  levothyroxine Injectable 50 MICROGram(s) IV Push at bedtime  vasopressin Infusion 0.04 Unit(s)/Min IV Continuous <Continuous>    folic acid 1 milliGRAM(s) Oral daily  sodium bicarbonate  Infusion 0.103 mEq/kG/Hr IV Continuous <Continuous>  sodium chloride 0.9% lock flush 10 milliLiter(s) IV Push every 1 hour PRN      chlorhexidine 4% Liquid 1 Application(s) Topical <User Schedule>        Mode: standby      ICU Vital Signs Last 24 Hrs  T(C): 38.7 (27 Jan 2023 02:03), Max: 38.7 (27 Jan 2023 02:03)  T(F): 101.7 (27 Jan 2023 02:03), Max: 101.7 (27 Jan 2023 02:03)  HR: 153 (27 Jan 2023 02:00) (104 - 183)  BP: --  BP(mean): --  ABP: 97/55 (27 Jan 2023 02:00) (72/52 - 108/80)  ABP(mean): 70 (27 Jan 2023 02:00) (59 - 111)  RR: 34 (27 Jan 2023 02:00) (0 - 48)  SpO2: 100% (27 Jan 2023 02:00) (83% - 100%)    O2 Parameters below as of 27 Jan 2023 02:00  Patient On (Oxygen Delivery Method): ventilator          Vital Signs Last 24 Hrs  T(C): 38.7 (27 Jan 2023 02:03), Max: 38.7 (27 Jan 2023 02:03)  T(F): 101.7 (27 Jan 2023 02:03), Max: 101.7 (27 Jan 2023 02:03)  HR: 153 (27 Jan 2023 02:00) (104 - 183)  BP: --  BP(mean): --  RR: 34 (27 Jan 2023 02:00) (0 - 48)  SpO2: 100% (27 Jan 2023 02:00) (83% - 100%)    Parameters below as of 27 Jan 2023 02:00  Patient On (Oxygen Delivery Method): ventilator        ABG - ( 27 Jan 2023 01:51 )  pH, Arterial: 7.22  pH, Blood: x     /  pCO2: 42    /  pO2: 145   / HCO3: 17    / Base Excess: -10.1 /  SaO2: 100                 I&O's Detail    25 Jan 2023 07:01  -  26 Jan 2023 07:00  --------------------------------------------------------  IN:    Dexmedetomidine: 525.6 mL    Enteral Tube Flush: 1645 mL    Free Water: 320 mL    IV PiggyBack: 650 mL    Nepro with Carb Steady: 1540 mL    Norepinephrine: 245.4 mL    Sodium Chloride 0.9% Bolus: 1000 mL  Total IN: 5926 mL    OUT:    Indwelling Catheter - Urethral (mL): 1570 mL    Rectal Tube (mL): 1200 mL  Total OUT: 2770 mL    Total NET: 3156 mL      26 Jan 2023 07:01  -  27 Jan 2023 03:14  --------------------------------------------------------  IN:    Amiodarone: 166.5 mL    Dexmedetomidine: 191.1 mL    Enteral Tube Flush: 525 mL    IV PiggyBack: 100 mL    IV PiggyBack: 100 mL    Nepro with Carb Steady: 490 mL    Norepinephrine: 71.2 mL    Phenylephrine: 17.4 mL  Total IN: 1661.2 mL    OUT:    Indwelling Catheter - Urethral (mL): 645 mL    Rectal Tube (mL): 500 mL  Total OUT: 1145 mL    Total NET: 516.2 mL            LABS:                        14.8   8.35  )-----------( 119      ( 27 Jan 2023 00:20 )             46.5     01-27    146<H>  |  117<H>  |  50<H>  ----------------------------<  105<H>  4.4   |  21<L>  |  2.33<H>    Ca    8.0<L>      27 Jan 2023 00:20  Phos  4.6     01-27  Mg     2.0     01-27    TPro  6.7  /  Alb  2.1<L>  /  TBili  1.8<H>  /  DBili  x   /  AST  228<H>  /  ALT  203<H>  /  AlkPhos  86  01-27          CAPILLARY BLOOD GLUCOSE        PT/INR - ( 27 Jan 2023 00:20 )   PT: 16.7 sec;   INR: 1.43 ratio         PTT - ( 27 Jan 2023 00:20 )  PTT:36.0 sec    CULTURES:  Culture Results:   Normal Respiratory Citlalli present (01-21 @ 10:49)

## 2023-01-27 NOTE — PROGRESS NOTE ADULT - PROBLEM SELECTOR PROBLEM 1
Drug-induced encephalopathy
Drug-induced encephalopathy
Acute respiratory failure with hypoxia
Acute respiratory failure with hypoxia
Drug-induced encephalopathy
Acute respiratory failure with hypoxia
Drug-induced encephalopathy
Acute respiratory failure with hypoxia
Drug-induced encephalopathy

## 2023-01-28 NOTE — DISCHARGE NOTE FOR THE EXPIRED PATIENT - HOSPITAL COURSE
67M with HTN, Thyroid Goiter s/p thyroidectomy, HLD, polysubstance abuse, etoh abuse admitted on 1/18 with hallucinations due to ETOH withdrawal found to have severe LV dysfunction and AMARJIT of unclear duration transferred to CCU on 1/20 with mixed respiratory failure due to aspiration pneumonitis and worsening mental status requiring intubation. Patient was unsuccessfully extubated and reintubated on 1/26. Post intubation course complicated by triple pressor shock, severe acidosis requiring sodium bicarbonate gtt, Rapid Afib requiring Amiodarone gtt and overall worsening MSOF. With worsening prognosis, family moved to comfort care measures and palliative extubation.             67M with HTN, Thyroid Goiter s/p thyroidectomy, HLD, polysubstance abuse, etoh abuse admitted on 1/18 with hallucinations due to ETOH withdrawal found to have severe LV dysfunction and AMARJIT of unclear duration transferred to CCU on 1/20 with mixed respiratory failure due to aspiration pneumonitis and worsening mental status requiring intubation. Patient was unsuccessfully extubated and reintubated on 1/26. Post intubation course complicated by triple pressor shock, severe acidosis requiring sodium bicarbonate gtt, Rapid Afib requiring Amiodarone gtt and overall worsening MSOF. With worsening prognosis, family moved to comfort care measures and palliative extubation.     Patient was compassionately extubated and disconnected from the vent by RT at approximately 23:58 on 1/27. I then examined the patient and found PEA on monitor, no palpable pulses at carotid and femoral locations, no spontaneous breath sounds were auscultated via stethoscope. There were no heart sounds auscultated via stethoscope at left and right sternal boarders as well at PMI, POCUS was performed by myself finding no evidence of ventricular contraction in parasternal long/short or subxiphoid views. The skin was cyanotic and cool and pupils were fixed and dilated without reaction to light and he was pronounced dead at 00:05 on 1/28.            67M with HTN, Thyroid Goiter s/p thyroidectomy, HLD, polysubstance abuse, etoh abuse admitted on 1/18 with hallucinations due to ETOH withdrawal found to have severe LV dysfunction and AMARJIT of unclear duration transferred to CCU on 1/20 with mixed respiratory failure due to aspiration pneumonitis and worsening mental status requiring intubation. Patient was unsuccessfully extubated and reintubated on 1/26. Post intubation course complicated by triple pressor shock, severe acidosis requiring sodium bicarbonate gtt, Rapid Afib requiring Amiodarone gtt and overall worsening MSOF. With worsening prognosis, family moved to comfort care measures and palliative extubation.     Patient was compassionately extubated and disconnected from the vent by RT at approximately 23:58 on 1/27. I then examined the patient and found PEA on monitor, no palpable pulses at carotid and femoral locations, no spontaneous breath sounds were auscultated via stethoscope. There were no heart sounds auscultated via stethoscope at left and right sternal boarders as well at PMI, POCUS was performed by myself finding no evidence of ventricular contraction in parasternal long/short or subxiphoid views. The skin was cyanotic and cool and pupils were fixed and dilated without reaction to light and he was pronounced dead at 00:05 on 1/28.

## 2023-01-28 NOTE — PROGRESS NOTE ADULT - REASON FOR ADMISSION
Altered mental status.

## 2023-01-28 NOTE — PROGRESS NOTE ADULT - PROVIDER SPECIALTY LIST ADULT
Critical Care
Critical Care
Gastroenterology
Critical Care
Electrophysiology
Electrophysiology
Gastroenterology
Gastroenterology
Hospitalist
Nephrology
Cardiology
Critical Care
Gastroenterology
Gastroenterology
Nephrology
Critical Care
Nephrology
Nephrology
Cardiology
Critical Care
Critical Care
Cardiology
Cardiology
Critical Care
Critical Care
Cardiology
Critical Care

## 2023-01-30 LAB
% GAMMA, URINE: 16.4 % — SIGNIFICANT CHANGE UP
ALBUMIN 24H MFR UR ELPH: 29.1 % — SIGNIFICANT CHANGE UP
ALPHA1 GLOB 24H MFR UR ELPH: 21.5 % — SIGNIFICANT CHANGE UP
ALPHA2 GLOB 24H MFR UR ELPH: 14.2 % — SIGNIFICANT CHANGE UP
B-GLOBULIN 24H MFR UR ELPH: 18.8 % — SIGNIFICANT CHANGE UP
COLLECT DURATION TIME UR: 24 HR — SIGNIFICANT CHANGE UP
INTERPRETATION 24H UR IFE-IMP: SIGNIFICANT CHANGE UP
INTERPRETATION 24H UR IFE-IMP: SIGNIFICANT CHANGE UP
M PROTEIN 24H UR ELPH-MRATE: 0 MG/24HR — SIGNIFICANT CHANGE UP (ref 0–0)
M PROTEIN 24H UR ELPH-MRATE: 0 MG/DL — SIGNIFICANT CHANGE UP
PROT PATTERN 24H UR ELPH-IMP: SIGNIFICANT CHANGE UP
PROTEIN QUANT CALC, URINE: 780 MG/24 H — HIGH (ref 50–100)
TOTAL VOLUME - 24 HOUR: 1500 ML — SIGNIFICANT CHANGE UP
URINE CREATININE CALCULATION: 1.4 G/24 H — SIGNIFICANT CHANGE UP (ref 1–2)

## 2023-02-04 DIAGNOSIS — Z66 DO NOT RESUSCITATE: ICD-10-CM

## 2023-02-04 DIAGNOSIS — J45.909 UNSPECIFIED ASTHMA, UNCOMPLICATED: ICD-10-CM

## 2023-02-04 DIAGNOSIS — I47.1 SUPRAVENTRICULAR TACHYCARDIA: ICD-10-CM

## 2023-02-04 DIAGNOSIS — I50.23 ACUTE ON CHRONIC SYSTOLIC (CONGESTIVE) HEART FAILURE: ICD-10-CM

## 2023-02-04 DIAGNOSIS — E87.20 ACIDOSIS, UNSPECIFIED: ICD-10-CM

## 2023-02-04 DIAGNOSIS — A41.9 SEPSIS, UNSPECIFIED ORGANISM: ICD-10-CM

## 2023-02-04 DIAGNOSIS — J96.02 ACUTE RESPIRATORY FAILURE WITH HYPERCAPNIA: ICD-10-CM

## 2023-02-04 DIAGNOSIS — J69.0 PNEUMONITIS DUE TO INHALATION OF FOOD AND VOMIT: ICD-10-CM

## 2023-02-04 DIAGNOSIS — F19.188 OTHER PSYCHOACTIVE SUBSTANCE ABUSE WITH OTHER PSYCHOACTIVE SUBSTANCE-INDUCED DISORDER: ICD-10-CM

## 2023-02-04 DIAGNOSIS — Z51.5 ENCOUNTER FOR PALLIATIVE CARE: ICD-10-CM

## 2023-02-04 DIAGNOSIS — E78.5 HYPERLIPIDEMIA, UNSPECIFIED: ICD-10-CM

## 2023-02-04 DIAGNOSIS — G92.8 OTHER TOXIC ENCEPHALOPATHY: ICD-10-CM

## 2023-02-04 DIAGNOSIS — E87.5 HYPERKALEMIA: ICD-10-CM

## 2023-02-04 DIAGNOSIS — R47.81 SLURRED SPEECH: ICD-10-CM

## 2023-02-04 DIAGNOSIS — R57.8 OTHER SHOCK: ICD-10-CM

## 2023-02-04 DIAGNOSIS — J96.01 ACUTE RESPIRATORY FAILURE WITH HYPOXIA: ICD-10-CM

## 2023-02-04 DIAGNOSIS — K57.00 DIVERTICULITIS OF SMALL INTESTINE WITH PERFORATION AND ABSCESS WITHOUT BLEEDING: ICD-10-CM

## 2023-02-04 DIAGNOSIS — I11.0 HYPERTENSIVE HEART DISEASE WITH HEART FAILURE: ICD-10-CM

## 2023-02-04 DIAGNOSIS — R65.21 SEVERE SEPSIS WITH SEPTIC SHOCK: ICD-10-CM

## 2023-02-04 DIAGNOSIS — E44.0 MODERATE PROTEIN-CALORIE MALNUTRITION: ICD-10-CM

## 2023-02-04 DIAGNOSIS — K70.10 ALCOHOLIC HEPATITIS WITHOUT ASCITES: ICD-10-CM

## 2023-02-04 DIAGNOSIS — E83.39 OTHER DISORDERS OF PHOSPHORUS METABOLISM: ICD-10-CM

## 2023-02-04 DIAGNOSIS — N17.9 ACUTE KIDNEY FAILURE, UNSPECIFIED: ICD-10-CM

## 2023-02-04 DIAGNOSIS — E87.1 HYPO-OSMOLALITY AND HYPONATREMIA: ICD-10-CM

## 2023-02-04 DIAGNOSIS — I42.6 ALCOHOLIC CARDIOMYOPATHY: ICD-10-CM

## 2023-02-04 DIAGNOSIS — F10.239 ALCOHOL DEPENDENCE WITH WITHDRAWAL, UNSPECIFIED: ICD-10-CM

## 2023-02-04 DIAGNOSIS — Z78.1 PHYSICAL RESTRAINT STATUS: ICD-10-CM

## 2024-03-28 NOTE — PROGRESS NOTE ADULT - PROBLEM SELECTOR PLAN 4
statin held , due to transaminitis , possible alcoholic cirrhosis.
statin held , due to transaminitis , possible alcoholic cirrhosis.
improved  CIWA protocol
CIWA protocol
Amiodarone drip
Patient develop few runs svt with rapid rate  brief ,    and one episode of NSVT   patient has severe LV dysfunction ,  can not start on BB due to hypotension on pressors ,    will call EP
statin held , due to transaminitis , possible alcoholic cirrhosis.
CIWA
Patient develop few runs svt with rapid rate  brief ,    and one episode of NSVT   patient has severe LV dysfunction ,  can not start on BB due to hypotension on pressors ,
CIWA protocol
Strong peripheral pulses